# Patient Record
Sex: MALE | Race: WHITE | Employment: OTHER | URBAN - METROPOLITAN AREA
[De-identification: names, ages, dates, MRNs, and addresses within clinical notes are randomized per-mention and may not be internally consistent; named-entity substitution may affect disease eponyms.]

---

## 2023-03-25 ENCOUNTER — HOSPITAL ENCOUNTER (INPATIENT)
Age: 78
LOS: 10 days | Discharge: NURSING, REHAB OR LONG-TERM CARE | DRG: 485 | End: 2023-04-04
Attending: FAMILY MEDICINE | Admitting: INTERNAL MEDICINE
Payer: MEDICARE

## 2023-03-25 DIAGNOSIS — T84.53XA INFECTION ASSOCIATED WITH INTERNAL RIGHT KNEE PROSTHESIS, INITIAL ENCOUNTER (HCC): ICD-10-CM

## 2023-03-25 DIAGNOSIS — B95.62 MRSA BACTEREMIA: ICD-10-CM

## 2023-03-25 DIAGNOSIS — A49.01 STAPHYLOCOCCUS AUREUS INFECTION: Primary | ICD-10-CM

## 2023-03-25 DIAGNOSIS — R78.81 MRSA BACTEREMIA: ICD-10-CM

## 2023-03-25 PROBLEM — S82.892A CLOSED LEFT ANKLE FRACTURE: Status: ACTIVE | Noted: 2023-03-25

## 2023-03-25 PROBLEM — Z99.89 OSA ON CPAP: Status: ACTIVE | Noted: 2023-03-25

## 2023-03-25 PROBLEM — E03.9 HYPOTHYROID: Status: ACTIVE | Noted: 2023-03-25

## 2023-03-25 PROBLEM — T84.59XA INFECTION OF PROSTHETIC KNEE JOINT, INITIAL ENCOUNTER (HCC): Status: ACTIVE | Noted: 2023-03-25

## 2023-03-25 PROBLEM — Z79.4 TYPE 2 DIABETES MELLITUS, WITH LONG-TERM CURRENT USE OF INSULIN (HCC): Status: ACTIVE | Noted: 2023-03-25

## 2023-03-25 PROBLEM — Z96.659 INFECTION OF PROSTHETIC KNEE JOINT, INITIAL ENCOUNTER (HCC): Status: ACTIVE | Noted: 2023-03-25

## 2023-03-25 PROBLEM — I10 PRIMARY HYPERTENSION: Status: ACTIVE | Noted: 2023-03-25

## 2023-03-25 PROBLEM — G47.33 OSA ON CPAP: Status: ACTIVE | Noted: 2023-03-25

## 2023-03-25 PROBLEM — E11.9 TYPE 2 DIABETES MELLITUS, WITH LONG-TERM CURRENT USE OF INSULIN (HCC): Status: ACTIVE | Noted: 2023-03-25

## 2023-03-25 PROBLEM — I25.10 CAD (CORONARY ARTERY DISEASE): Status: ACTIVE | Noted: 2023-03-25

## 2023-03-25 PROBLEM — E66.01 MORBID OBESITY (HCC): Status: ACTIVE | Noted: 2023-03-25

## 2023-03-25 LAB
ANION GAP SERPL CALCULATED.3IONS-SCNC: 9 MMOL/L (ref 3–16)
BASOPHILS # BLD: 0.1 K/UL (ref 0–0.2)
BASOPHILS NFR BLD: 0.6 %
BUN SERPL-MCNC: 20 MG/DL (ref 7–20)
CALCIUM SERPL-MCNC: 9.5 MG/DL (ref 8.3–10.6)
CHLORIDE SERPL-SCNC: 101 MMOL/L (ref 99–110)
CO2 SERPL-SCNC: 27 MMOL/L (ref 21–32)
CREAT SERPL-MCNC: 1.2 MG/DL (ref 0.8–1.3)
DEPRECATED RDW RBC AUTO: 14.9 % (ref 12.4–15.4)
EOSINOPHIL # BLD: 0 K/UL (ref 0–0.6)
EOSINOPHIL NFR BLD: 0 %
GFR SERPLBLD CREATININE-BSD FMLA CKD-EPI: >60 ML/MIN/{1.73_M2}
GLUCOSE BLD-MCNC: 171 MG/DL (ref 70–99)
GLUCOSE SERPL-MCNC: 198 MG/DL (ref 70–99)
HCT VFR BLD AUTO: 45.9 % (ref 40.5–52.5)
HGB BLD-MCNC: 15.1 G/DL (ref 13.5–17.5)
LYMPHOCYTES # BLD: 0.9 K/UL (ref 1–5.1)
LYMPHOCYTES NFR BLD: 6.4 %
MCH RBC QN AUTO: 30.8 PG (ref 26–34)
MCHC RBC AUTO-ENTMCNC: 33 G/DL (ref 31–36)
MCV RBC AUTO: 93.4 FL (ref 80–100)
MONOCYTES # BLD: 1.1 K/UL (ref 0–1.3)
MONOCYTES NFR BLD: 8 %
NEUTROPHILS # BLD: 11.4 K/UL (ref 1.7–7.7)
NEUTROPHILS NFR BLD: 85 %
PERFORMED ON: ABNORMAL
PLATELET # BLD AUTO: 178 K/UL (ref 135–450)
PMV BLD AUTO: 9 FL (ref 5–10.5)
POTASSIUM SERPL-SCNC: 4.9 MMOL/L (ref 3.5–5.1)
RBC # BLD AUTO: 4.91 M/UL (ref 4.2–5.9)
SODIUM SERPL-SCNC: 137 MMOL/L (ref 136–145)
WBC # BLD AUTO: 13.4 K/UL (ref 4–11)

## 2023-03-25 PROCEDURE — 85025 COMPLETE CBC W/AUTO DIFF WBC: CPT

## 2023-03-25 PROCEDURE — 87186 SC STD MICRODIL/AGAR DIL: CPT

## 2023-03-25 PROCEDURE — 80048 BASIC METABOLIC PNL TOTAL CA: CPT

## 2023-03-25 PROCEDURE — 6370000000 HC RX 637 (ALT 250 FOR IP): Performed by: INTERNAL MEDICINE

## 2023-03-25 PROCEDURE — 83036 HEMOGLOBIN GLYCOSYLATED A1C: CPT

## 2023-03-25 PROCEDURE — 6360000002 HC RX W HCPCS: Performed by: INTERNAL MEDICINE

## 2023-03-25 PROCEDURE — 2580000003 HC RX 258: Performed by: INTERNAL MEDICINE

## 2023-03-25 PROCEDURE — 87150 DNA/RNA AMPLIFIED PROBE: CPT

## 2023-03-25 PROCEDURE — 84145 PROCALCITONIN (PCT): CPT

## 2023-03-25 PROCEDURE — 1200000000 HC SEMI PRIVATE

## 2023-03-25 PROCEDURE — 87040 BLOOD CULTURE FOR BACTERIA: CPT

## 2023-03-25 RX ORDER — POLYETHYLENE GLYCOL 3350 17 G/17G
17 POWDER, FOR SOLUTION ORAL DAILY PRN
Status: DISCONTINUED | OUTPATIENT
Start: 2023-03-25 | End: 2023-04-01

## 2023-03-25 RX ORDER — SIMVASTATIN 20 MG
TABLET ORAL
COMMUNITY
Start: 2023-03-21

## 2023-03-25 RX ORDER — OXYCODONE HYDROCHLORIDE 5 MG/1
10 TABLET ORAL EVERY 4 HOURS PRN
Status: DISCONTINUED | OUTPATIENT
Start: 2023-03-25 | End: 2023-04-04 | Stop reason: HOSPADM

## 2023-03-25 RX ORDER — INSULIN GLARGINE 100 [IU]/ML
INJECTION, SOLUTION SUBCUTANEOUS
COMMUNITY
Start: 2022-12-27

## 2023-03-25 RX ORDER — SODIUM CHLORIDE 0.9 % (FLUSH) 0.9 %
5-40 SYRINGE (ML) INJECTION PRN
Status: DISCONTINUED | OUTPATIENT
Start: 2023-03-25 | End: 2023-04-04 | Stop reason: HOSPADM

## 2023-03-25 RX ORDER — SODIUM CHLORIDE 0.9 % (FLUSH) 0.9 %
5-40 SYRINGE (ML) INJECTION EVERY 12 HOURS SCHEDULED
Status: DISCONTINUED | OUTPATIENT
Start: 2023-03-25 | End: 2023-04-04 | Stop reason: HOSPADM

## 2023-03-25 RX ORDER — VITAMIN B COMPLEX
1000 TABLET ORAL 2 TIMES DAILY
Status: DISCONTINUED | OUTPATIENT
Start: 2023-03-25 | End: 2023-04-04 | Stop reason: HOSPADM

## 2023-03-25 RX ORDER — LOSARTAN POTASSIUM 100 MG/1
TABLET ORAL
COMMUNITY
Start: 2020-11-06

## 2023-03-25 RX ORDER — ASPIRIN 81 MG/1
81 TABLET ORAL
Status: ON HOLD | COMMUNITY
Start: 2019-05-23 | End: 2023-03-29 | Stop reason: HOSPADM

## 2023-03-25 RX ORDER — ENOXAPARIN SODIUM 100 MG/ML
30 INJECTION SUBCUTANEOUS 2 TIMES DAILY
Status: DISCONTINUED | OUTPATIENT
Start: 2023-03-26 | End: 2023-04-04 | Stop reason: HOSPADM

## 2023-03-25 RX ORDER — CLOPIDOGREL BISULFATE 75 MG/1
75 TABLET ORAL DAILY
Status: DISCONTINUED | OUTPATIENT
Start: 2023-03-26 | End: 2023-04-04 | Stop reason: HOSPADM

## 2023-03-25 RX ORDER — DEXTROSE MONOHYDRATE 100 MG/ML
INJECTION, SOLUTION INTRAVENOUS CONTINUOUS PRN
Status: DISCONTINUED | OUTPATIENT
Start: 2023-03-25 | End: 2023-04-04 | Stop reason: HOSPADM

## 2023-03-25 RX ORDER — MORPHINE SULFATE 2 MG/ML
2 INJECTION, SOLUTION INTRAMUSCULAR; INTRAVENOUS
Status: DISCONTINUED | OUTPATIENT
Start: 2023-03-25 | End: 2023-04-04 | Stop reason: HOSPADM

## 2023-03-25 RX ORDER — INSULIN LISPRO 100 [IU]/ML
0-4 INJECTION, SOLUTION INTRAVENOUS; SUBCUTANEOUS NIGHTLY
Status: DISCONTINUED | OUTPATIENT
Start: 2023-03-25 | End: 2023-03-30

## 2023-03-25 RX ORDER — INSULIN LISPRO 100 [IU]/ML
0-8 INJECTION, SOLUTION INTRAVENOUS; SUBCUTANEOUS
Status: DISCONTINUED | OUTPATIENT
Start: 2023-03-26 | End: 2023-03-29 | Stop reason: DRUGHIGH

## 2023-03-25 RX ORDER — METOPROLOL SUCCINATE 50 MG/1
50 TABLET, EXTENDED RELEASE ORAL DAILY
Status: DISCONTINUED | OUTPATIENT
Start: 2023-03-26 | End: 2023-04-04 | Stop reason: HOSPADM

## 2023-03-25 RX ORDER — INSULIN LISPRO 100 [IU]/ML
12 INJECTION, SOLUTION INTRAVENOUS; SUBCUTANEOUS
Status: DISCONTINUED | OUTPATIENT
Start: 2023-03-26 | End: 2023-03-26

## 2023-03-25 RX ORDER — ONDANSETRON 4 MG/1
4 TABLET, ORALLY DISINTEGRATING ORAL EVERY 8 HOURS PRN
Status: DISCONTINUED | OUTPATIENT
Start: 2023-03-25 | End: 2023-04-04 | Stop reason: HOSPADM

## 2023-03-25 RX ORDER — METOPROLOL SUCCINATE 50 MG/1
TABLET, EXTENDED RELEASE ORAL
COMMUNITY
Start: 2023-03-21

## 2023-03-25 RX ORDER — ASPIRIN 81 MG/1
81 TABLET ORAL DAILY
Status: DISCONTINUED | OUTPATIENT
Start: 2023-03-25 | End: 2023-04-04 | Stop reason: HOSPADM

## 2023-03-25 RX ORDER — ACETAMINOPHEN 325 MG/1
650 TABLET ORAL EVERY 6 HOURS PRN
Status: DISCONTINUED | OUTPATIENT
Start: 2023-03-25 | End: 2023-04-04 | Stop reason: HOSPADM

## 2023-03-25 RX ORDER — CLOPIDOGREL BISULFATE 75 MG/1
TABLET ORAL
COMMUNITY
Start: 2022-12-16

## 2023-03-25 RX ORDER — LEVOTHYROXINE SODIUM 0.1 MG/1
100 TABLET ORAL DAILY
Status: DISCONTINUED | OUTPATIENT
Start: 2023-03-25 | End: 2023-04-04 | Stop reason: HOSPADM

## 2023-03-25 RX ORDER — ONDANSETRON 2 MG/ML
4 INJECTION INTRAMUSCULAR; INTRAVENOUS EVERY 6 HOURS PRN
Status: DISCONTINUED | OUTPATIENT
Start: 2023-03-25 | End: 2023-04-04 | Stop reason: HOSPADM

## 2023-03-25 RX ORDER — INSULIN GLARGINE 100 [IU]/ML
44 INJECTION, SOLUTION SUBCUTANEOUS EVERY MORNING
Status: DISCONTINUED | OUTPATIENT
Start: 2023-03-26 | End: 2023-03-26

## 2023-03-25 RX ORDER — ENOXAPARIN SODIUM 100 MG/ML
40 INJECTION SUBCUTANEOUS NIGHTLY
Status: DISCONTINUED | OUTPATIENT
Start: 2023-03-26 | End: 2023-03-25

## 2023-03-25 RX ORDER — LOSARTAN POTASSIUM 100 MG/1
100 TABLET ORAL DAILY
Status: DISCONTINUED | OUTPATIENT
Start: 2023-03-26 | End: 2023-03-26

## 2023-03-25 RX ORDER — LEVOTHYROXINE SODIUM 0.1 MG/1
TABLET ORAL
COMMUNITY
Start: 2022-11-08

## 2023-03-25 RX ORDER — ACETAMINOPHEN 650 MG/1
650 SUPPOSITORY RECTAL EVERY 6 HOURS PRN
Status: DISCONTINUED | OUTPATIENT
Start: 2023-03-25 | End: 2023-04-04 | Stop reason: HOSPADM

## 2023-03-25 RX ORDER — OXYCODONE HYDROCHLORIDE 5 MG/1
5 TABLET ORAL EVERY 4 HOURS PRN
Status: DISCONTINUED | OUTPATIENT
Start: 2023-03-25 | End: 2023-04-04 | Stop reason: HOSPADM

## 2023-03-25 RX ORDER — MORPHINE SULFATE 4 MG/ML
4 INJECTION, SOLUTION INTRAMUSCULAR; INTRAVENOUS
Status: DISCONTINUED | OUTPATIENT
Start: 2023-03-25 | End: 2023-04-04 | Stop reason: HOSPADM

## 2023-03-25 RX ORDER — ATORVASTATIN CALCIUM 10 MG/1
10 TABLET, FILM COATED ORAL DAILY
Status: DISCONTINUED | OUTPATIENT
Start: 2023-03-25 | End: 2023-04-04 | Stop reason: HOSPADM

## 2023-03-25 RX ORDER — SODIUM CHLORIDE 9 MG/ML
INJECTION, SOLUTION INTRAVENOUS PRN
Status: DISCONTINUED | OUTPATIENT
Start: 2023-03-25 | End: 2023-04-04 | Stop reason: HOSPADM

## 2023-03-25 RX ADMIN — ATORVASTATIN CALCIUM 10 MG: 10 TABLET, FILM COATED ORAL at 23:20

## 2023-03-25 RX ADMIN — ASPIRIN 81 MG: 81 TABLET, COATED ORAL at 23:20

## 2023-03-25 RX ADMIN — SODIUM CHLORIDE: 9 INJECTION, SOLUTION INTRAVENOUS at 23:25

## 2023-03-25 RX ADMIN — CEFEPIME 2000 MG: 2 INJECTION, POWDER, FOR SOLUTION INTRAVENOUS at 23:27

## 2023-03-25 RX ADMIN — LEVOTHYROXINE SODIUM 100 MCG: 100 TABLET ORAL at 23:19

## 2023-03-25 RX ADMIN — Medication 10 ML: at 23:23

## 2023-03-25 RX ADMIN — Medication 1000 UNITS: at 23:20

## 2023-03-26 ENCOUNTER — APPOINTMENT (OUTPATIENT)
Dept: GENERAL RADIOLOGY | Age: 78
DRG: 485 | End: 2023-03-26
Attending: FAMILY MEDICINE
Payer: MEDICARE

## 2023-03-26 LAB
ALBUMIN SERPL-MCNC: 3.1 G/DL (ref 3.4–5)
ALP SERPL-CCNC: 197 U/L (ref 40–129)
ALT SERPL-CCNC: 210 U/L (ref 10–40)
ANION GAP SERPL CALCULATED.3IONS-SCNC: 10 MMOL/L (ref 3–16)
APPEARANCE FLUID: NORMAL
AST SERPL-CCNC: 137 U/L (ref 15–37)
BASOPHILS # BLD: 0.1 K/UL (ref 0–0.2)
BASOPHILS NFR BLD: 0.4 %
BDY FLUID QUALITY: NORMAL
BILIRUB DIRECT SERPL-MCNC: 1.7 MG/DL (ref 0–0.3)
BILIRUB INDIRECT SERPL-MCNC: 1.1 MG/DL (ref 0–1)
BILIRUB SERPL-MCNC: 2.8 MG/DL (ref 0–1)
BUN SERPL-MCNC: 20 MG/DL (ref 7–20)
CALCIUM SERPL-MCNC: 9.6 MG/DL (ref 8.3–10.6)
CELL COUNT FLUID TYPE: NORMAL
CHLORIDE SERPL-SCNC: 103 MMOL/L (ref 99–110)
CO2 SERPL-SCNC: 23 MMOL/L (ref 21–32)
COLOR FLUID: NORMAL
CREAT SERPL-MCNC: 1.2 MG/DL (ref 0.8–1.3)
CRP SERPL-MCNC: 271.2 MG/L (ref 0–5.1)
DEPRECATED RDW RBC AUTO: 15 % (ref 12.4–15.4)
EOSINOPHIL # BLD: 0 K/UL (ref 0–0.6)
EOSINOPHIL NFR BLD: 0 %
ERYTHROCYTE [SEDIMENTATION RATE] IN BLOOD BY WESTERGREN METHOD: 67 MM/HR (ref 0–20)
GFR SERPLBLD CREATININE-BSD FMLA CKD-EPI: >60 ML/MIN/{1.73_M2}
GLUCOSE BLD-MCNC: 197 MG/DL (ref 70–99)
GLUCOSE BLD-MCNC: 222 MG/DL (ref 70–99)
GLUCOSE BLD-MCNC: 242 MG/DL (ref 70–99)
GLUCOSE BLD-MCNC: 249 MG/DL (ref 70–99)
GLUCOSE BLD-MCNC: 254 MG/DL (ref 70–99)
GLUCOSE SERPL-MCNC: 229 MG/DL (ref 70–99)
HCT VFR BLD AUTO: 42.7 % (ref 40.5–52.5)
HGB BLD-MCNC: 13.9 G/DL (ref 13.5–17.5)
INR PPP: 1.35 (ref 0.87–1.14)
LACTATE BLDV-SCNC: 1.5 MMOL/L (ref 0.4–2)
LYMPHOCYTES # BLD: 0.9 K/UL (ref 1–5.1)
LYMPHOCYTES NFR BLD: 6.3 %
LYMPHOCYTES NFR FLD: 2 %
MAGNESIUM SERPL-MCNC: 2 MG/DL (ref 1.8–2.4)
MCH RBC QN AUTO: 30.4 PG (ref 26–34)
MCHC RBC AUTO-ENTMCNC: 32.5 G/DL (ref 31–36)
MCV RBC AUTO: 93.6 FL (ref 80–100)
MONOCYTES # BLD: 1.2 K/UL (ref 0–1.3)
MONOCYTES NFR BLD: 8.9 %
MONOCYTES NFR FLD: 4 %
NEUTROPHIL, FLUID: 94 %
NEUTROPHILS # BLD: 11.8 K/UL (ref 1.7–7.7)
NEUTROPHILS NFR BLD: 84.4 %
NUC CELL # FLD: NORMAL /CUMM
PERFORMED ON: ABNORMAL
PHOSPHATE SERPL-MCNC: 2.8 MG/DL (ref 2.5–4.9)
PLATELET # BLD AUTO: 170 K/UL (ref 135–450)
PMV BLD AUTO: 8.9 FL (ref 5–10.5)
POTASSIUM SERPL-SCNC: 4.7 MMOL/L (ref 3.5–5.1)
PROCALCITONIN SERPL IA-MCNC: 0.46 NG/ML (ref 0–0.15)
PROCALCITONIN SERPL IA-MCNC: 0.5 NG/ML (ref 0–0.15)
PROT SERPL-MCNC: 6.2 G/DL (ref 6.4–8.2)
PROTHROMBIN TIME: 16.7 SEC (ref 11.7–14.5)
RBC # BLD AUTO: 4.56 M/UL (ref 4.2–5.9)
RBC FLUID: 6100 /CUMM
SODIUM SERPL-SCNC: 136 MMOL/L (ref 136–145)
TOTAL CELLS COUNTED FLD: 100
VANCOMYCIN SERPL-MCNC: 5 UG/ML
WBC # BLD AUTO: 14 K/UL (ref 4–11)

## 2023-03-26 PROCEDURE — 87070 CULTURE OTHR SPECIMN AEROBIC: CPT

## 2023-03-26 PROCEDURE — 87077 CULTURE AEROBIC IDENTIFY: CPT

## 2023-03-26 PROCEDURE — 1200000000 HC SEMI PRIVATE

## 2023-03-26 PROCEDURE — 6360000002 HC RX W HCPCS: Performed by: INTERNAL MEDICINE

## 2023-03-26 PROCEDURE — 6370000000 HC RX 637 (ALT 250 FOR IP): Performed by: INTERNAL MEDICINE

## 2023-03-26 PROCEDURE — 80048 BASIC METABOLIC PNL TOTAL CA: CPT

## 2023-03-26 PROCEDURE — 86140 C-REACTIVE PROTEIN: CPT

## 2023-03-26 PROCEDURE — 86403 PARTICLE AGGLUT ANTBDY SCRN: CPT

## 2023-03-26 PROCEDURE — 80076 HEPATIC FUNCTION PANEL: CPT

## 2023-03-26 PROCEDURE — 85652 RBC SED RATE AUTOMATED: CPT

## 2023-03-26 PROCEDURE — 99223 1ST HOSP IP/OBS HIGH 75: CPT | Performed by: ORTHOPAEDIC SURGERY

## 2023-03-26 PROCEDURE — 89051 BODY FLUID CELL COUNT: CPT

## 2023-03-26 PROCEDURE — 87205 SMEAR GRAM STAIN: CPT

## 2023-03-26 PROCEDURE — 87186 SC STD MICRODIL/AGAR DIL: CPT

## 2023-03-26 PROCEDURE — 84145 PROCALCITONIN (PCT): CPT

## 2023-03-26 PROCEDURE — 83605 ASSAY OF LACTIC ACID: CPT

## 2023-03-26 PROCEDURE — 2580000003 HC RX 258: Performed by: INTERNAL MEDICINE

## 2023-03-26 PROCEDURE — 73562 X-RAY EXAM OF KNEE 3: CPT

## 2023-03-26 PROCEDURE — 85610 PROTHROMBIN TIME: CPT

## 2023-03-26 PROCEDURE — 83735 ASSAY OF MAGNESIUM: CPT

## 2023-03-26 PROCEDURE — 20610 DRAIN/INJ JOINT/BURSA W/O US: CPT | Performed by: ORTHOPAEDIC SURGERY

## 2023-03-26 PROCEDURE — 84100 ASSAY OF PHOSPHORUS: CPT

## 2023-03-26 PROCEDURE — 85025 COMPLETE CBC W/AUTO DIFF WBC: CPT

## 2023-03-26 PROCEDURE — 80202 ASSAY OF VANCOMYCIN: CPT

## 2023-03-26 RX ORDER — INSULIN LISPRO 100 [IU]/ML
10 INJECTION, SOLUTION INTRAVENOUS; SUBCUTANEOUS
Status: DISCONTINUED | OUTPATIENT
Start: 2023-03-26 | End: 2023-03-27

## 2023-03-26 RX ORDER — LOSARTAN POTASSIUM 25 MG/1
50 TABLET ORAL DAILY
Status: DISCONTINUED | OUTPATIENT
Start: 2023-03-27 | End: 2023-04-04 | Stop reason: HOSPADM

## 2023-03-26 RX ORDER — INSULIN GLARGINE 100 [IU]/ML
30 INJECTION, SOLUTION SUBCUTANEOUS EVERY MORNING
Status: DISCONTINUED | OUTPATIENT
Start: 2023-03-27 | End: 2023-03-27

## 2023-03-26 RX ADMIN — ENOXAPARIN SODIUM 30 MG: 100 INJECTION SUBCUTANEOUS at 22:06

## 2023-03-26 RX ADMIN — Medication 1000 UNITS: at 22:06

## 2023-03-26 RX ADMIN — ASPIRIN 81 MG: 81 TABLET, COATED ORAL at 09:31

## 2023-03-26 RX ADMIN — VANCOMYCIN HYDROCHLORIDE 1250 MG: 10 INJECTION, POWDER, LYOPHILIZED, FOR SOLUTION INTRAVENOUS at 17:01

## 2023-03-26 RX ADMIN — CEFEPIME 2000 MG: 2 INJECTION, POWDER, FOR SOLUTION INTRAVENOUS at 22:26

## 2023-03-26 RX ADMIN — Medication 1000 UNITS: at 09:31

## 2023-03-26 RX ADMIN — Medication 10 ML: at 09:31

## 2023-03-26 RX ADMIN — CEFEPIME 2000 MG: 2 INJECTION, POWDER, FOR SOLUTION INTRAVENOUS at 16:17

## 2023-03-26 RX ADMIN — SODIUM CHLORIDE: 9 INJECTION, SOLUTION INTRAVENOUS at 06:24

## 2023-03-26 RX ADMIN — ATORVASTATIN CALCIUM 10 MG: 10 TABLET, FILM COATED ORAL at 09:31

## 2023-03-26 RX ADMIN — INSULIN LISPRO 2 UNITS: 100 INJECTION, SOLUTION INTRAVENOUS; SUBCUTANEOUS at 16:15

## 2023-03-26 RX ADMIN — INSULIN LISPRO 2 UNITS: 100 INJECTION, SOLUTION INTRAVENOUS; SUBCUTANEOUS at 09:03

## 2023-03-26 RX ADMIN — CEFEPIME 2000 MG: 2 INJECTION, POWDER, FOR SOLUTION INTRAVENOUS at 06:25

## 2023-03-26 NOTE — H&P
Hospital Medicine History & Physical      PCP: Corrine Duran MD    Date of Admission: 3/25/2023    Date of Service: Pt seen/examined on 03/25/23 and Admitted to Inpatient with expected LOS greater than two midnights due to medical therapy. Chief Complaint: Right knee pain and swelling x 2 days       History Of Present Illness:      Td Brenner is 76 y.o. male with history of right total knee replacement in 2013  About 1 month ago he sustained a fracture of the left ankle and currently wearing orthopedic cast boot for 1 more week  In addition, he has history of diabetes mellitus type 2 on insulin, hypertension, morbid obesity and obstructive sleep apnea on CPAP  He is now directly admitted from [facility] in [facility] for right knee pain and swelling x 2 days   The patient had a problem with this knee before which was treated for infection with surgery and without surgery in the past  He did see Dr. Latonya Nina in November 2022 for a protrusion from the right kneecap area  He had x-ray done in January and patient states he was told likely needs to have surgery  He has not followed up with orthopedics since then  Patient states he typically uses a walker and he has been able to get around without much difficulty  He thinks the right knee started to swell, became red and painful for no reason since yesterday  He denies any trauma for  Pain is constant, 6/0 without movement and 10/10 with movement or palpation.   He is unable to ambulate  Last night he noticed open wound and drainage from the protrusion  He had night sweats but denies any fever    Past Medical History:      Type 2 diabetes mellitus, with long-term current use of insulin (HCC)  CAD (coronary artery disease)  Primary hypertension  Morbid obesity (HCC)  Hypothyroid  SANTI on CPAP  Closed left ankle fracture      Past Surgical History:      Right total knee replacement in 2013    Medications Prior to Admission:      Prior to

## 2023-03-27 ENCOUNTER — APPOINTMENT (OUTPATIENT)
Dept: ULTRASOUND IMAGING | Age: 78
DRG: 485 | End: 2023-03-27
Attending: FAMILY MEDICINE
Payer: MEDICARE

## 2023-03-27 ENCOUNTER — APPOINTMENT (OUTPATIENT)
Dept: GENERAL RADIOLOGY | Age: 78
DRG: 485 | End: 2023-03-27
Attending: FAMILY MEDICINE
Payer: MEDICARE

## 2023-03-27 ENCOUNTER — ANESTHESIA EVENT (OUTPATIENT)
Dept: OPERATING ROOM | Age: 78
End: 2023-03-27
Payer: MEDICARE

## 2023-03-27 LAB
ALBUMIN SERPL-MCNC: 2.9 G/DL (ref 3.4–5)
ALBUMIN/GLOB SERPL: 0.9 {RATIO} (ref 1.1–2.2)
ALP SERPL-CCNC: 180 U/L (ref 40–129)
ALT SERPL-CCNC: 134 U/L (ref 10–40)
ANION GAP SERPL CALCULATED.3IONS-SCNC: 12 MMOL/L (ref 3–16)
AST SERPL-CCNC: 44 U/L (ref 15–37)
BASOPHILS # BLD: 0.1 K/UL (ref 0–0.2)
BASOPHILS NFR BLD: 0.6 %
BILIRUB SERPL-MCNC: 1.6 MG/DL (ref 0–1)
BUN SERPL-MCNC: 28 MG/DL (ref 7–20)
CALCIUM SERPL-MCNC: 9.6 MG/DL (ref 8.3–10.6)
CHLORIDE SERPL-SCNC: 104 MMOL/L (ref 99–110)
CO2 SERPL-SCNC: 21 MMOL/L (ref 21–32)
CREAT SERPL-MCNC: 1.4 MG/DL (ref 0.8–1.3)
DEPRECATED RDW RBC AUTO: 14.4 % (ref 12.4–15.4)
EOSINOPHIL # BLD: 0 K/UL (ref 0–0.6)
EOSINOPHIL NFR BLD: 0.1 %
EST. AVERAGE GLUCOSE BLD GHB EST-MCNC: 157.1 MG/DL
GFR SERPLBLD CREATININE-BSD FMLA CKD-EPI: 51 ML/MIN/{1.73_M2}
GLUCOSE BLD-MCNC: 152 MG/DL (ref 70–99)
GLUCOSE BLD-MCNC: 188 MG/DL (ref 70–99)
GLUCOSE BLD-MCNC: 267 MG/DL (ref 70–99)
GLUCOSE BLD-MCNC: 291 MG/DL (ref 70–99)
GLUCOSE SERPL-MCNC: 291 MG/DL (ref 70–99)
HBA1C MFR BLD: 7.1 %
HCT VFR BLD AUTO: 42.3 % (ref 40.5–52.5)
HGB BLD-MCNC: 13.7 G/DL (ref 13.5–17.5)
LYMPHOCYTES # BLD: 0.9 K/UL (ref 1–5.1)
LYMPHOCYTES NFR BLD: 7.9 %
MCH RBC QN AUTO: 30.2 PG (ref 26–34)
MCHC RBC AUTO-ENTMCNC: 32.3 G/DL (ref 31–36)
MCV RBC AUTO: 93.5 FL (ref 80–100)
MONOCYTES # BLD: 1.1 K/UL (ref 0–1.3)
MONOCYTES NFR BLD: 9.2 %
NEUTROPHILS # BLD: 9.8 K/UL (ref 1.7–7.7)
NEUTROPHILS NFR BLD: 82.2 %
PERFORMED ON: ABNORMAL
PLATELET # BLD AUTO: 155 K/UL (ref 135–450)
PMV BLD AUTO: 9.2 FL (ref 5–10.5)
POTASSIUM SERPL-SCNC: 4.7 MMOL/L (ref 3.5–5.1)
PROT SERPL-MCNC: 6.3 G/DL (ref 6.4–8.2)
RBC # BLD AUTO: 4.53 M/UL (ref 4.2–5.9)
SODIUM SERPL-SCNC: 137 MMOL/L (ref 136–145)
VANCOMYCIN SERPL-MCNC: 11.1 UG/ML
WBC # BLD AUTO: 11.9 K/UL (ref 4–11)

## 2023-03-27 PROCEDURE — 76705 ECHO EXAM OF ABDOMEN: CPT

## 2023-03-27 PROCEDURE — 6370000000 HC RX 637 (ALT 250 FOR IP): Performed by: INTERNAL MEDICINE

## 2023-03-27 PROCEDURE — 6360000002 HC RX W HCPCS: Performed by: INTERNAL MEDICINE

## 2023-03-27 PROCEDURE — 1200000000 HC SEMI PRIVATE

## 2023-03-27 PROCEDURE — 36415 COLL VENOUS BLD VENIPUNCTURE: CPT

## 2023-03-27 PROCEDURE — 2580000003 HC RX 258: Performed by: INTERNAL MEDICINE

## 2023-03-27 PROCEDURE — 85025 COMPLETE CBC W/AUTO DIFF WBC: CPT

## 2023-03-27 PROCEDURE — 73610 X-RAY EXAM OF ANKLE: CPT

## 2023-03-27 PROCEDURE — 6370000000 HC RX 637 (ALT 250 FOR IP): Performed by: PHYSICIAN ASSISTANT

## 2023-03-27 PROCEDURE — 80202 ASSAY OF VANCOMYCIN: CPT

## 2023-03-27 PROCEDURE — 80053 COMPREHEN METABOLIC PANEL: CPT

## 2023-03-27 PROCEDURE — 99223 1ST HOSP IP/OBS HIGH 75: CPT | Performed by: INTERNAL MEDICINE

## 2023-03-27 RX ORDER — INSULIN LISPRO 100 [IU]/ML
13 INJECTION, SOLUTION INTRAVENOUS; SUBCUTANEOUS
Status: DISCONTINUED | OUTPATIENT
Start: 2023-03-27 | End: 2023-03-29

## 2023-03-27 RX ORDER — INSULIN GLARGINE 100 [IU]/ML
40 INJECTION, SOLUTION SUBCUTANEOUS EVERY MORNING
Status: DISCONTINUED | OUTPATIENT
Start: 2023-03-28 | End: 2023-03-29

## 2023-03-27 RX ORDER — LIDOCAINE HYDROCHLORIDE 10 MG/ML
5 INJECTION, SOLUTION EPIDURAL; INFILTRATION; INTRACAUDAL; PERINEURAL ONCE
Status: DISCONTINUED | OUTPATIENT
Start: 2023-03-27 | End: 2023-03-29

## 2023-03-27 RX ORDER — SODIUM CHLORIDE 0.9 % (FLUSH) 0.9 %
5-40 SYRINGE (ML) INJECTION PRN
Status: DISCONTINUED | OUTPATIENT
Start: 2023-03-27 | End: 2023-04-04 | Stop reason: HOSPADM

## 2023-03-27 RX ORDER — SODIUM CHLORIDE 0.9 % (FLUSH) 0.9 %
5-40 SYRINGE (ML) INJECTION EVERY 12 HOURS SCHEDULED
Status: DISCONTINUED | OUTPATIENT
Start: 2023-03-27 | End: 2023-04-04 | Stop reason: HOSPADM

## 2023-03-27 RX ORDER — AMLODIPINE BESYLATE 5 MG/1
5 TABLET ORAL DAILY
Status: DISCONTINUED | OUTPATIENT
Start: 2023-03-27 | End: 2023-03-28

## 2023-03-27 RX ORDER — SODIUM CHLORIDE 9 MG/ML
25 INJECTION, SOLUTION INTRAVENOUS PRN
Status: DISCONTINUED | OUTPATIENT
Start: 2023-03-27 | End: 2023-04-04 | Stop reason: HOSPADM

## 2023-03-27 RX ADMIN — INSULIN LISPRO 10 UNITS: 100 INJECTION, SOLUTION INTRAVENOUS; SUBCUTANEOUS at 09:39

## 2023-03-27 RX ADMIN — METOPROLOL SUCCINATE 50 MG: 50 TABLET, EXTENDED RELEASE ORAL at 09:36

## 2023-03-27 RX ADMIN — CEFAZOLIN 2000 MG: 2 INJECTION, POWDER, FOR SOLUTION INTRAMUSCULAR; INTRAVENOUS at 21:14

## 2023-03-27 RX ADMIN — INSULIN LISPRO 4 UNITS: 100 INJECTION, SOLUTION INTRAVENOUS; SUBCUTANEOUS at 09:40

## 2023-03-27 RX ADMIN — Medication 1000 UNITS: at 21:11

## 2023-03-27 RX ADMIN — VANCOMYCIN HYDROCHLORIDE 1250 MG: 10 INJECTION, POWDER, LYOPHILIZED, FOR SOLUTION INTRAVENOUS at 15:51

## 2023-03-27 RX ADMIN — INSULIN GLARGINE 30 UNITS: 100 INJECTION, SOLUTION SUBCUTANEOUS at 09:39

## 2023-03-27 RX ADMIN — CEFEPIME 2000 MG: 2 INJECTION, POWDER, FOR SOLUTION INTRAVENOUS at 05:35

## 2023-03-27 RX ADMIN — INSULIN LISPRO 4 UNITS: 100 INJECTION, SOLUTION INTRAVENOUS; SUBCUTANEOUS at 13:10

## 2023-03-27 RX ADMIN — INSULIN LISPRO 13 UNITS: 100 INJECTION, SOLUTION INTRAVENOUS; SUBCUTANEOUS at 16:59

## 2023-03-27 RX ADMIN — LOSARTAN POTASSIUM 50 MG: 25 TABLET, FILM COATED ORAL at 09:36

## 2023-03-27 RX ADMIN — ATORVASTATIN CALCIUM 10 MG: 10 TABLET, FILM COATED ORAL at 09:36

## 2023-03-27 RX ADMIN — LEVOTHYROXINE SODIUM 100 MCG: 100 TABLET ORAL at 05:33

## 2023-03-27 RX ADMIN — CEFEPIME 2000 MG: 2 INJECTION, POWDER, FOR SOLUTION INTRAVENOUS at 14:54

## 2023-03-27 RX ADMIN — ONDANSETRON 4 MG: 4 TABLET, ORALLY DISINTEGRATING ORAL at 21:23

## 2023-03-27 RX ADMIN — Medication 1000 UNITS: at 09:36

## 2023-03-27 RX ADMIN — AMLODIPINE BESYLATE 5 MG: 5 TABLET ORAL at 13:15

## 2023-03-27 NOTE — ANESTHESIA PRE PROCEDURE
COVID19        Anesthesia Evaluation    Airway:           Dental:          Pulmonary:   (+) sleep apnea: on CPAP,                             Cardiovascular:    (+) hypertension:, CAD:, CABG/stent: no interval change, hyperlipidemia                  Neuro/Psych:               GI/Hepatic/Renal:             Endo/Other:    (+) DiabetesType II DM, , hypothyroidism, blood dyscrasia: anticoagulation therapy:., .                  ROS comment: A1C 7.5 Abdominal:             Vascular:   + DVT, . Other Findings:           Anesthesia Plan      general     ASA 3       Induction: intravenous. MIPS: Postoperative opioids intended and Prophylactic antiemetics administered. Anesthetic plan and risks discussed with patient.         Attending anesthesiologist reviewed and agrees with Preprocedure content                PHONG Yost - [place]   3/27/2023

## 2023-03-28 ENCOUNTER — APPOINTMENT (OUTPATIENT)
Dept: GENERAL RADIOLOGY | Age: 78
DRG: 485 | End: 2023-03-28
Attending: FAMILY MEDICINE
Payer: MEDICARE

## 2023-03-28 ENCOUNTER — ANESTHESIA (OUTPATIENT)
Dept: OPERATING ROOM | Age: 78
End: 2023-03-28
Payer: MEDICARE

## 2023-03-28 LAB
ALBUMIN SERPL-MCNC: 3 G/DL (ref 3.4–5)
ALBUMIN/GLOB SERPL: 0.8 {RATIO} (ref 1.1–2.2)
ALP SERPL-CCNC: 192 U/L (ref 40–129)
ALT SERPL-CCNC: 96 U/L (ref 10–40)
ANION GAP SERPL CALCULATED.3IONS-SCNC: 13 MMOL/L (ref 3–16)
AST SERPL-CCNC: 26 U/L (ref 15–37)
BASOPHILS # BLD: 0 K/UL (ref 0–0.2)
BASOPHILS NFR BLD: 0.2 %
BILIRUB SERPL-MCNC: 1 MG/DL (ref 0–1)
BUN SERPL-MCNC: 32 MG/DL (ref 7–20)
CALCIUM SERPL-MCNC: 9.6 MG/DL (ref 8.3–10.6)
CHLORIDE SERPL-SCNC: 101 MMOL/L (ref 99–110)
CO2 SERPL-SCNC: 24 MMOL/L (ref 21–32)
CREAT SERPL-MCNC: 1.1 MG/DL (ref 0.8–1.3)
DEPRECATED RDW RBC AUTO: 14.8 % (ref 12.4–15.4)
EOSINOPHIL # BLD: 0 K/UL (ref 0–0.6)
EOSINOPHIL NFR BLD: 0 %
GFR SERPLBLD CREATININE-BSD FMLA CKD-EPI: >60 ML/MIN/{1.73_M2}
GLUCOSE BLD-MCNC: 248 MG/DL (ref 70–99)
GLUCOSE BLD-MCNC: 251 MG/DL (ref 70–99)
GLUCOSE BLD-MCNC: 274 MG/DL (ref 70–99)
GLUCOSE BLD-MCNC: 298 MG/DL (ref 70–99)
GLUCOSE BLD-MCNC: 318 MG/DL (ref 70–99)
GLUCOSE SERPL-MCNC: 287 MG/DL (ref 70–99)
HCT VFR BLD AUTO: 42.6 % (ref 40.5–52.5)
HGB BLD-MCNC: 13.8 G/DL (ref 13.5–17.5)
LYMPHOCYTES # BLD: 0.5 K/UL (ref 1–5.1)
LYMPHOCYTES NFR BLD: 4.9 %
MCH RBC QN AUTO: 30.3 PG (ref 26–34)
MCHC RBC AUTO-ENTMCNC: 32.3 G/DL (ref 31–36)
MCV RBC AUTO: 93.6 FL (ref 80–100)
MONOCYTES # BLD: 0.5 K/UL (ref 0–1.3)
MONOCYTES NFR BLD: 4.2 %
NEUTROPHILS # BLD: 10.1 K/UL (ref 1.7–7.7)
NEUTROPHILS NFR BLD: 90.7 %
PERFORMED ON: ABNORMAL
PLATELET # BLD AUTO: 177 K/UL (ref 135–450)
PMV BLD AUTO: 9.3 FL (ref 5–10.5)
POTASSIUM SERPL-SCNC: 4.5 MMOL/L (ref 3.5–5.1)
PROT SERPL-MCNC: 7 G/DL (ref 6.4–8.2)
RBC # BLD AUTO: 4.55 M/UL (ref 4.2–5.9)
REPORT: NORMAL
SODIUM SERPL-SCNC: 138 MMOL/L (ref 136–145)
WBC # BLD AUTO: 11.1 K/UL (ref 4–11)

## 2023-03-28 PROCEDURE — 85025 COMPLETE CBC W/AUTO DIFF WBC: CPT

## 2023-03-28 PROCEDURE — 2500000003 HC RX 250 WO HCPCS: Performed by: NURSE ANESTHETIST, CERTIFIED REGISTERED

## 2023-03-28 PROCEDURE — C1769 GUIDE WIRE: HCPCS | Performed by: ORTHOPAEDIC SURGERY

## 2023-03-28 PROCEDURE — 87205 SMEAR GRAM STAIN: CPT

## 2023-03-28 PROCEDURE — 02HV33Z INSERTION OF INFUSION DEVICE INTO SUPERIOR VENA CAVA, PERCUTANEOUS APPROACH: ICD-10-PCS | Performed by: INTERNAL MEDICINE

## 2023-03-28 PROCEDURE — 2720000010 HC SURG SUPPLY STERILE: Performed by: ORTHOPAEDIC SURGERY

## 2023-03-28 PROCEDURE — 3700000001 HC ADD 15 MINUTES (ANESTHESIA): Performed by: ORTHOPAEDIC SURGERY

## 2023-03-28 PROCEDURE — 99233 SBSQ HOSP IP/OBS HIGH 50: CPT | Performed by: INTERNAL MEDICINE

## 2023-03-28 PROCEDURE — 3700000000 HC ANESTHESIA ATTENDED CARE: Performed by: ORTHOPAEDIC SURGERY

## 2023-03-28 PROCEDURE — 6360000002 HC RX W HCPCS: Performed by: INTERNAL MEDICINE

## 2023-03-28 PROCEDURE — 2580000003 HC RX 258: Performed by: ANESTHESIOLOGY

## 2023-03-28 PROCEDURE — 73560 X-RAY EXAM OF KNEE 1 OR 2: CPT

## 2023-03-28 PROCEDURE — 3600000015 HC SURGERY LEVEL 5 ADDTL 15MIN: Performed by: ORTHOPAEDIC SURGERY

## 2023-03-28 PROCEDURE — 6370000000 HC RX 637 (ALT 250 FOR IP): Performed by: PHYSICIAN ASSISTANT

## 2023-03-28 PROCEDURE — 7100000001 HC PACU RECOVERY - ADDTL 15 MIN: Performed by: ORTHOPAEDIC SURGERY

## 2023-03-28 PROCEDURE — 3600000005 HC SURGERY LEVEL 5 BASE: Performed by: ORTHOPAEDIC SURGERY

## 2023-03-28 PROCEDURE — 6360000002 HC RX W HCPCS: Performed by: ORTHOPAEDIC SURGERY

## 2023-03-28 PROCEDURE — 2500000003 HC RX 250 WO HCPCS: Performed by: ORTHOPAEDIC SURGERY

## 2023-03-28 PROCEDURE — 6360000002 HC RX W HCPCS: Performed by: PHYSICIAN ASSISTANT

## 2023-03-28 PROCEDURE — 36415 COLL VENOUS BLD VENIPUNCTURE: CPT

## 2023-03-28 PROCEDURE — C1751 CATH, INF, PER/CENT/MIDLINE: HCPCS

## 2023-03-28 PROCEDURE — 2580000003 HC RX 258: Performed by: INTERNAL MEDICINE

## 2023-03-28 PROCEDURE — C9113 INJ PANTOPRAZOLE SODIUM, VIA: HCPCS | Performed by: PHYSICIAN ASSISTANT

## 2023-03-28 PROCEDURE — 2709999900 HC NON-CHARGEABLE SUPPLY: Performed by: ORTHOPAEDIC SURGERY

## 2023-03-28 PROCEDURE — 2580000003 HC RX 258: Performed by: ORTHOPAEDIC SURGERY

## 2023-03-28 PROCEDURE — 87070 CULTURE OTHR SPECIMN AEROBIC: CPT

## 2023-03-28 PROCEDURE — 6370000000 HC RX 637 (ALT 250 FOR IP): Performed by: NURSE PRACTITIONER

## 2023-03-28 PROCEDURE — 0S9C0ZZ DRAINAGE OF RIGHT KNEE JOINT, OPEN APPROACH: ICD-10-PCS | Performed by: ORTHOPAEDIC SURGERY

## 2023-03-28 PROCEDURE — 1200000000 HC SEMI PRIVATE

## 2023-03-28 PROCEDURE — 36569 INSJ PICC 5 YR+ W/O IMAGING: CPT

## 2023-03-28 PROCEDURE — 7100000000 HC PACU RECOVERY - FIRST 15 MIN: Performed by: ORTHOPAEDIC SURGERY

## 2023-03-28 PROCEDURE — 0QPD04Z REMOVAL OF INTERNAL FIXATION DEVICE FROM RIGHT PATELLA, OPEN APPROACH: ICD-10-PCS | Performed by: ORTHOPAEDIC SURGERY

## 2023-03-28 PROCEDURE — C1713 ANCHOR/SCREW BN/BN,TIS/BN: HCPCS | Performed by: ORTHOPAEDIC SURGERY

## 2023-03-28 PROCEDURE — 6360000002 HC RX W HCPCS: Performed by: NURSE ANESTHETIST, CERTIFIED REGISTERED

## 2023-03-28 PROCEDURE — 87176 TISSUE HOMOGENIZATION CULTR: CPT

## 2023-03-28 PROCEDURE — 80053 COMPREHEN METABOLIC PANEL: CPT

## 2023-03-28 PROCEDURE — 0SHC08Z INSERTION OF SPACER INTO RIGHT KNEE JOINT, OPEN APPROACH: ICD-10-PCS | Performed by: ORTHOPAEDIC SURGERY

## 2023-03-28 PROCEDURE — 6370000000 HC RX 637 (ALT 250 FOR IP): Performed by: INTERNAL MEDICINE

## 2023-03-28 DEVICE — IMPLANTABLE DEVICE: Type: IMPLANTABLE DEVICE | Site: KNEE | Status: FUNCTIONAL

## 2023-03-28 DEVICE — CEMENT BNE 40GM HI VISC RADPQ FOR REV SURG: Type: IMPLANTABLE DEVICE | Site: KNEE | Status: FUNCTIONAL

## 2023-03-28 RX ORDER — SODIUM CHLORIDE 9 MG/ML
INJECTION, SOLUTION INTRAVENOUS CONTINUOUS
Status: DISCONTINUED | OUTPATIENT
Start: 2023-03-28 | End: 2023-03-28 | Stop reason: HOSPADM

## 2023-03-28 RX ORDER — ONDANSETRON 2 MG/ML
4 INJECTION INTRAMUSCULAR; INTRAVENOUS
Status: DISCONTINUED | OUTPATIENT
Start: 2023-03-28 | End: 2023-03-28 | Stop reason: HOSPADM

## 2023-03-28 RX ORDER — INSULIN GLARGINE 100 [IU]/ML
20 INJECTION, SOLUTION SUBCUTANEOUS ONCE
Status: COMPLETED | OUTPATIENT
Start: 2023-03-28 | End: 2023-03-28

## 2023-03-28 RX ORDER — SODIUM CHLORIDE 9 MG/ML
INJECTION, SOLUTION INTRAVENOUS PRN
Status: DISCONTINUED | OUTPATIENT
Start: 2023-03-28 | End: 2023-03-28 | Stop reason: HOSPADM

## 2023-03-28 RX ORDER — LIDOCAINE HYDROCHLORIDE 20 MG/ML
INJECTION, SOLUTION EPIDURAL; INFILTRATION; INTRACAUDAL; PERINEURAL PRN
Status: DISCONTINUED | OUTPATIENT
Start: 2023-03-28 | End: 2023-03-28 | Stop reason: SDUPTHER

## 2023-03-28 RX ORDER — TOBRAMYCIN 1.2 G/30ML
INJECTION, POWDER, LYOPHILIZED, FOR SOLUTION INTRAVENOUS
Status: COMPLETED | OUTPATIENT
Start: 2023-03-28 | End: 2023-03-28

## 2023-03-28 RX ORDER — HYDROMORPHONE HCL 110MG/55ML
0.5 PATIENT CONTROLLED ANALGESIA SYRINGE INTRAVENOUS EVERY 5 MIN PRN
Status: DISCONTINUED | OUTPATIENT
Start: 2023-03-28 | End: 2023-03-28 | Stop reason: HOSPADM

## 2023-03-28 RX ORDER — CALCIUM CARBONATE 200(500)MG
500 TABLET,CHEWABLE ORAL 3 TIMES DAILY PRN
Status: DISCONTINUED | OUTPATIENT
Start: 2023-03-28 | End: 2023-04-04 | Stop reason: HOSPADM

## 2023-03-28 RX ORDER — FENTANYL CITRATE 50 UG/ML
INJECTION, SOLUTION INTRAMUSCULAR; INTRAVENOUS PRN
Status: DISCONTINUED | OUTPATIENT
Start: 2023-03-28 | End: 2023-03-28 | Stop reason: SDUPTHER

## 2023-03-28 RX ORDER — DIPHENHYDRAMINE HYDROCHLORIDE 50 MG/ML
12.5 INJECTION INTRAMUSCULAR; INTRAVENOUS
Status: DISCONTINUED | OUTPATIENT
Start: 2023-03-28 | End: 2023-03-28 | Stop reason: HOSPADM

## 2023-03-28 RX ORDER — HYDROMORPHONE HCL 110MG/55ML
0.25 PATIENT CONTROLLED ANALGESIA SYRINGE INTRAVENOUS EVERY 5 MIN PRN
Status: DISCONTINUED | OUTPATIENT
Start: 2023-03-28 | End: 2023-03-28 | Stop reason: HOSPADM

## 2023-03-28 RX ORDER — VANCOMYCIN HYDROCHLORIDE 1 G/20ML
INJECTION, POWDER, LYOPHILIZED, FOR SOLUTION INTRAVENOUS
Status: COMPLETED | OUTPATIENT
Start: 2023-03-28 | End: 2023-03-28

## 2023-03-28 RX ORDER — PROPOFOL 10 MG/ML
INJECTION, EMULSION INTRAVENOUS PRN
Status: DISCONTINUED | OUTPATIENT
Start: 2023-03-28 | End: 2023-03-28

## 2023-03-28 RX ORDER — ACETAMINOPHEN 650 MG
TABLET, EXTENDED RELEASE ORAL
Status: COMPLETED | OUTPATIENT
Start: 2023-03-28 | End: 2023-03-28

## 2023-03-28 RX ORDER — DEXAMETHASONE SODIUM PHOSPHATE 4 MG/ML
INJECTION, SOLUTION INTRA-ARTICULAR; INTRALESIONAL; INTRAMUSCULAR; INTRAVENOUS; SOFT TISSUE PRN
Status: DISCONTINUED | OUTPATIENT
Start: 2023-03-28 | End: 2023-03-28 | Stop reason: SDUPTHER

## 2023-03-28 RX ORDER — MEPERIDINE HYDROCHLORIDE 25 MG/ML
12.5 INJECTION INTRAMUSCULAR; INTRAVENOUS; SUBCUTANEOUS EVERY 5 MIN PRN
Status: DISCONTINUED | OUTPATIENT
Start: 2023-03-28 | End: 2023-03-28 | Stop reason: HOSPADM

## 2023-03-28 RX ORDER — TRANEXAMIC ACID 10 MG/ML
1000 INJECTION, SOLUTION INTRAVENOUS ONCE
Status: COMPLETED | OUTPATIENT
Start: 2023-03-28 | End: 2023-03-28

## 2023-03-28 RX ORDER — SODIUM CHLORIDE 0.9 % (FLUSH) 0.9 %
5-40 SYRINGE (ML) INJECTION PRN
Status: DISCONTINUED | OUTPATIENT
Start: 2023-03-28 | End: 2023-03-28 | Stop reason: HOSPADM

## 2023-03-28 RX ORDER — ONDANSETRON 2 MG/ML
INJECTION INTRAMUSCULAR; INTRAVENOUS PRN
Status: DISCONTINUED | OUTPATIENT
Start: 2023-03-28 | End: 2023-03-28 | Stop reason: SDUPTHER

## 2023-03-28 RX ORDER — AMLODIPINE BESYLATE 5 MG/1
10 TABLET ORAL DAILY
Status: DISCONTINUED | OUTPATIENT
Start: 2023-03-28 | End: 2023-04-03

## 2023-03-28 RX ORDER — PROPOFOL 10 MG/ML
INJECTION, EMULSION INTRAVENOUS PRN
Status: DISCONTINUED | OUTPATIENT
Start: 2023-03-28 | End: 2023-03-28 | Stop reason: SDUPTHER

## 2023-03-28 RX ORDER — SUCCINYLCHOLINE/SOD CL,ISO/PF 200MG/10ML
SYRINGE (ML) INTRAVENOUS PRN
Status: DISCONTINUED | OUTPATIENT
Start: 2023-03-28 | End: 2023-03-28 | Stop reason: SDUPTHER

## 2023-03-28 RX ORDER — SODIUM CHLORIDE 0.9 % (FLUSH) 0.9 %
5-40 SYRINGE (ML) INJECTION EVERY 12 HOURS SCHEDULED
Status: DISCONTINUED | OUTPATIENT
Start: 2023-03-28 | End: 2023-03-28 | Stop reason: HOSPADM

## 2023-03-28 RX ORDER — ROCURONIUM BROMIDE 10 MG/ML
INJECTION, SOLUTION INTRAVENOUS PRN
Status: DISCONTINUED | OUTPATIENT
Start: 2023-03-28 | End: 2023-03-28 | Stop reason: SDUPTHER

## 2023-03-28 RX ORDER — PANTOPRAZOLE SODIUM 40 MG/10ML
40 INJECTION, POWDER, LYOPHILIZED, FOR SOLUTION INTRAVENOUS DAILY
Status: DISCONTINUED | OUTPATIENT
Start: 2023-03-28 | End: 2023-03-29

## 2023-03-28 RX ORDER — VASOPRESSIN 20 U/ML
INJECTION PARENTERAL PRN
Status: DISCONTINUED | OUTPATIENT
Start: 2023-03-28 | End: 2023-03-28 | Stop reason: SDUPTHER

## 2023-03-28 RX ORDER — CLONIDINE HYDROCHLORIDE 0.1 MG/1
0.1 TABLET ORAL EVERY 4 HOURS PRN
Status: DISCONTINUED | OUTPATIENT
Start: 2023-03-28 | End: 2023-04-04 | Stop reason: HOSPADM

## 2023-03-28 RX ADMIN — SODIUM CHLORIDE: 9 INJECTION, SOLUTION INTRAVENOUS at 12:00

## 2023-03-28 RX ADMIN — CEFAZOLIN 2000 MG: 2 INJECTION, POWDER, FOR SOLUTION INTRAMUSCULAR; INTRAVENOUS at 12:51

## 2023-03-28 RX ADMIN — SODIUM CHLORIDE: 9 INJECTION, SOLUTION INTRAVENOUS at 10:23

## 2023-03-28 RX ADMIN — SUGAMMADEX 200 MG: 100 INJECTION, SOLUTION INTRAVENOUS at 12:56

## 2023-03-28 RX ADMIN — INSULIN LISPRO 13 UNITS: 100 INJECTION, SOLUTION INTRAVENOUS; SUBCUTANEOUS at 16:42

## 2023-03-28 RX ADMIN — Medication 1000 UNITS: at 21:34

## 2023-03-28 RX ADMIN — Medication 160 MG: at 09:45

## 2023-03-28 RX ADMIN — PHENYLEPHRINE HYDROCHLORIDE 200 MCG: 10 INJECTION INTRAVENOUS at 12:33

## 2023-03-28 RX ADMIN — DEXAMETHASONE SODIUM PHOSPHATE 4 MG: 4 INJECTION, SOLUTION INTRAMUSCULAR; INTRAVENOUS at 10:30

## 2023-03-28 RX ADMIN — TRANEXAMIC ACID 1000 MG: 10 INJECTION, SOLUTION INTRAVENOUS at 10:10

## 2023-03-28 RX ADMIN — SODIUM CHLORIDE: 9 INJECTION, SOLUTION INTRAVENOUS at 09:20

## 2023-03-28 RX ADMIN — ANTACID TABLETS 500 MG: 500 TABLET, CHEWABLE ORAL at 05:28

## 2023-03-28 RX ADMIN — FENTANYL CITRATE 25 MCG: 50 INJECTION, SOLUTION INTRAMUSCULAR; INTRAVENOUS at 12:54

## 2023-03-28 RX ADMIN — INSULIN LISPRO 4 UNITS: 100 INJECTION, SOLUTION INTRAVENOUS; SUBCUTANEOUS at 21:34

## 2023-03-28 RX ADMIN — INSULIN GLARGINE 20 UNITS: 100 INJECTION, SOLUTION SUBCUTANEOUS at 08:15

## 2023-03-28 RX ADMIN — CEFAZOLIN 2000 MG: 2 INJECTION, POWDER, FOR SOLUTION INTRAMUSCULAR; INTRAVENOUS at 05:30

## 2023-03-28 RX ADMIN — PROPOFOL 170 MG: 10 INJECTION, EMULSION INTRAVENOUS at 09:45

## 2023-03-28 RX ADMIN — LIDOCAINE HYDROCHLORIDE 100 MG: 20 INJECTION, SOLUTION EPIDURAL; INFILTRATION; INTRACAUDAL; PERINEURAL at 09:44

## 2023-03-28 RX ADMIN — PHENYLEPHRINE HYDROCHLORIDE 200 MCG: 10 INJECTION INTRAVENOUS at 12:25

## 2023-03-28 RX ADMIN — VASOPRESSIN 2 UNITS: 20 INJECTION INTRAVENOUS at 12:51

## 2023-03-28 RX ADMIN — FENTANYL CITRATE 25 MCG: 50 INJECTION, SOLUTION INTRAMUSCULAR; INTRAVENOUS at 09:43

## 2023-03-28 RX ADMIN — INSULIN LISPRO 4 UNITS: 100 INJECTION, SOLUTION INTRAVENOUS; SUBCUTANEOUS at 16:41

## 2023-03-28 RX ADMIN — ENOXAPARIN SODIUM 30 MG: 100 INJECTION SUBCUTANEOUS at 21:34

## 2023-03-28 RX ADMIN — FENTANYL CITRATE 25 MCG: 50 INJECTION, SOLUTION INTRAMUSCULAR; INTRAVENOUS at 09:39

## 2023-03-28 RX ADMIN — METOPROLOL SUCCINATE 50 MG: 50 TABLET, EXTENDED RELEASE ORAL at 08:13

## 2023-03-28 RX ADMIN — LEVOTHYROXINE SODIUM 100 MCG: 100 TABLET ORAL at 05:28

## 2023-03-28 RX ADMIN — FENTANYL CITRATE 25 MCG: 50 INJECTION, SOLUTION INTRAMUSCULAR; INTRAVENOUS at 12:47

## 2023-03-28 RX ADMIN — ROCURONIUM BROMIDE 40 MG: 10 INJECTION, SOLUTION INTRAVENOUS at 10:10

## 2023-03-28 RX ADMIN — INSULIN LISPRO 4 UNITS: 100 INJECTION, SOLUTION INTRAVENOUS; SUBCUTANEOUS at 08:16

## 2023-03-28 RX ADMIN — VANCOMYCIN HYDROCHLORIDE 1250 MG: 10 INJECTION, POWDER, LYOPHILIZED, FOR SOLUTION INTRAVENOUS at 16:04

## 2023-03-28 RX ADMIN — PANTOPRAZOLE SODIUM 40 MG: 40 INJECTION, POWDER, FOR SOLUTION INTRAVENOUS at 08:12

## 2023-03-28 RX ADMIN — ONDANSETRON 4 MG: 2 INJECTION INTRAMUSCULAR; INTRAVENOUS at 10:30

## 2023-03-28 RX ADMIN — AMLODIPINE BESYLATE 10 MG: 5 TABLET ORAL at 08:13

## 2023-03-28 RX ADMIN — BUPIVACAINE HYDROCHLORIDE AND EPINEPHRINE BITARTRATE: 2.5; .005 INJECTION, SOLUTION EPIDURAL; INFILTRATION; INTRACAUDAL; PERINEURAL at 12:51

## 2023-03-28 ASSESSMENT — PAIN SCALES - GENERAL: PAINLEVEL_OUTOF10: 0

## 2023-03-28 ASSESSMENT — PAIN - FUNCTIONAL ASSESSMENT
PAIN_FUNCTIONAL_ASSESSMENT: 0-10
PAIN_FUNCTIONAL_ASSESSMENT: 0-10

## 2023-03-28 NOTE — ANESTHESIA PRE PROCEDURE
Component Value Date/Time     03/28/2023 04:45 AM    K 4.5 03/28/2023 04:45 AM     03/28/2023 04:45 AM    CO2 24 03/28/2023 04:45 AM    BUN 32 03/28/2023 04:45 AM    CREATININE 1.1 03/28/2023 04:45 AM    AGRATIO 0.8 03/28/2023 04:45 AM    LABGLOM >60 03/28/2023 04:45 AM    GLUCOSE 287 03/28/2023 04:45 AM    PROT 7.0 03/28/2023 04:45 AM    CALCIUM 9.6 03/28/2023 04:45 AM    BILITOT 1.0 03/28/2023 04:45 AM    ALKPHOS 192 03/28/2023 04:45 AM    AST 26 03/28/2023 04:45 AM    ALT 96 03/28/2023 04:45 AM       POC Tests:   Recent Labs     03/28/23  0718   POCGLU 274*       Coags:   Lab Results   Component Value Date/Time    PROTIME 16.7 03/26/2023 05:50 AM    INR 1.35 03/26/2023 05:50 AM       HCG (If Applicable): No results found for: PREGTESTUR, PREGSERUM, HCG, HCGQUANT     ABGs: No results found for: PHART, PO2ART, NXZ1QCB, DAO4NRQ, BEART, W5WSYPXX     Type & Screen (If Applicable):  No results found for: LABABO, LABRH    Drug/Infectious Status (If Applicable):  No results found for: HIV, HEPCAB    COVID-19 Screening (If Applicable): No results found for: COVID19        Anesthesia Evaluation  Patient summary reviewed and Nursing notes reviewed  Airway: Mallampati: III  TM distance: >3 FB   Neck ROM: full  Mouth opening: < 3 FB   Dental:          Pulmonary:   (+) sleep apnea: on CPAP,                             Cardiovascular:  Exercise tolerance: poor (<4 METS),   (+) hypertension:, CAD: non-obstructive, CABG/stent: no interval change, hyperlipidemia                  Neuro/Psych:               GI/Hepatic/Renal:   (+) morbid obesity          Endo/Other:    (+) DiabetesType II DM, well controlled, , hypothyroidism, blood dyscrasia: anticoagulation therapy:., .                  ROS comment: A1C 7.5 Abdominal:             Vascular:   + DVT, . Other Findings:             Anesthesia Plan      general     ASA 3       Induction: intravenous.     MIPS: Postoperative opioids intended, Prophylactic antiemetics

## 2023-03-28 NOTE — ANESTHESIA POSTPROCEDURE EVALUATION
Department of Anesthesiology  Postprocedure Note    Patient: Clarisa Coelho  MRN: [id number]  YOB: 1945  Date of evaluation: 3/28/2023      Procedure Summary     Date: 03/28/23 Room / Location: [address]    Anesthesia Start: 5377 Anesthesia Stop: 5206    Procedure: RIGHT KNEE HARDWARE REMOVAL, PLACEMENT OF ANTIBIOTIC SPACER (Right: Knee) Diagnosis:       Infection associated with internal right knee prosthesis, initial encounter ([facility]      (RIGHT KNEE INFECTION)    Surgeons: Boar Sherman MD Responsible Provider: Denisha Robert MD    Anesthesia Type: general ASA Status: 3          Anesthesia Type: No value filed.     Chino Phase I: Chino Score: 8    [place]ino Phase II:        Anesthesia Post Evaluation    Patient location during evaluation: PACU  Patient participation: complete - patient participated  Level of consciousness: awake and alert  Pain score: 2  Airway patency: patent  Nausea & Vomiting: no vomiting  Complications: no  Cardiovascular status: blood pressure returned to baseline  Respiratory status: acceptable  Hydration status: euvolemic  Multimodal analgesia pain management approach

## 2023-03-28 NOTE — CONSULTS
Attempt to place Picc line unsuccessful r/t obstruction in the chest. Unable to advance Picc catheter to the   SVC. Removed line. Placed 14 cm midline and noted excellent blood return and flushed without difficulty. Pt. Tolerated well.
Infectious Diseases Inpatient Consult Note    Reason for Consult:   R PJI  Requesting Physician:   Dr Dina Wise  Primary Care Physician:  Stevenson Guzman MD  History Obtained From:   Pt, EPIC    Admit Date: 3/25/2023  Hospital Day: 3    CHIEF COMPLAINT:     R knee infection    HISTORY OF PRESENT ILLNESS:      77 yo man  PMH - obesity (BMI 47), DM, HTN, CAD, OA, SANTI, hypothyroid  PSurgHx - R TKA, R TKA 2 stage (space / revision 2013)    L distal fibular fx 1 mo ago  Hx R knee swelling, seen 11/2022, seen by Ortho yet did not f/u  Developed R knee redness, swelling and pain over 2 days  No assoc fever / chills, + noc sweats. No trauma    Seen at [facility]  Transfer to [facility] 3/25  Afeb, WBC 14  Started on Vancomycin, Cefepime  Seen by Ortho 3/26, aspiration, 'thick yellow fluid'  WBC 14,672 - 94%, RBC 6,100.   GS 4+WBC, 1+GPC, cult light S aureus    Today 3/27, T 99.4,   C/o R knee pain with any movement    Past Medical History:    Past Medical History:   Diagnosis Date    CAD (coronary artery disease)     DM (diabetes mellitus), type 2 ([address]     Hypertension        Past Surgical History:    Past Surgical History:   Procedure Laterality Date    CARDIAC CATHETERIZATION      had coronary stenting in 2008    REVISION TOTAL KNEE ARTHROPLASTY Right     REVISION TOTAL KNEE ARTHROPLASTY Right     had infected R knee-had spacer and subsequent revision in 2013       Current Medications:     [START ON 3/28/2023] insulin glargine  40 Units SubCUTAneous QAM    insulin lispro  13 Units SubCUTAneous TID WC    amLODIPine  5 mg Oral Daily    vancomycin  1,250 mg IntraVENous Q24H    [Held by provider] losartan  50 mg Oral Daily    [Held by provider] aspirin  81 mg Oral Daily    atorvastatin  10 mg Oral Daily    levothyroxine  100 mcg Oral Daily    Vitamin D  1,000 Units Oral BID    [Held by provider] clopidogrel  75 mg Oral Daily    metoprolol succinate  50 mg Oral Daily    sodium chloride flush  5-40 mL IntraVENous 2 times per
TRIMMABLE POWER MIDLINE PROCEDURE NOTE  Chart reviewed for allergies, diagnosis, labs, known contraindications, reason for line placement and planned length of treatment. Insertion procedure discussed with patient/family member. Informed consent not required for Midline placement. Three patient identifiers - Patient name,   and MRN -  completed &  confirmed verbally. Hat, mask and eye shield donned. Midline site scrubbed with Chloraprep  One-Step applicator  for 30 seconds x 1. Hand Hygiene  performed with 3% Chlorhexidine surgical scrub x1 min prior to  Sterile gloves, sterile gown being donned. Patient draped using maximal sterile barrier technique ( head to toe ). Midline site scrubbed a 2nd time with Chloraprep One-Step applicator x 30 sec. Vein located by [employer] and site marked with sterile pen. 1% Lidocaine 5 ml injected intradermal pre-insertion. Midline inserted. Positive brisk blood return obtained. Valve applied to lumen. Midline flushed with 10 mls  0.9% Sterile Sodium Chloride. Midline flushes easily with no resistance. Skin prep applied to site. Catheter secured with non-sutured locking device per hospital protocol. Bio-patch/CHG impregnated sterile tegaderm dressing applied. Alcohol Swab Caps applied to valve. Sterile field maintained during procedure. Positioning wire accounted for post procedure. Pt. Response to procedure, tolerated well. Appearance of site: Clean dry and intact without bleeding or edema. All edges of Tegaderm occlusive. Site marked with date and initials of RN placing line. Top 2 side rails in up position, call button in reach, RN notified of all of the above. A Trimmable Power Midline  14  CM placed in the  CHANDANA Cephalic   vein. 0 cm showing from insertion site.
ulcerations, rashes or lesions about the right knee. There is pain to palpation of the right knee. Motor: Intact DF/PF. Sensation: Grossly intact to light touch throughout the right lower extremity. Vascular: 1+ pulse. Labs reviewed:  Recent Labs     03/25/23 2324 03/26/23  0550   WBC 13.4* 14.0*   HGB 15.1 13.9   HCT 45.9 42.7    170     Recent Labs     03/25/23 2324 03/26/23  0550    136   K 4.9 4.7    103   CO2 27 23   BUN 20 20   CREATININE 1.2 1.2   GLUCOSE 198* 229*   CALCIUM 9.5 9.6   MG  --  2.00   PHOS  --  2.8     Recent Labs     03/26/23  0550   INR 1.35*   PROTIME 16.7*       No results found for: COLORU, CLARITYU, PH, PHUR, LABSPEC, GLUCOSEU, BLOODU, LEUKOCYTESUR, NITRITE, BILIRUBINUR, UROBILINOGEN, RBCUA, WBCUA, BACTERIA, AMORPHOUS, CRYSTAL    Imaging:  I independently evaluated and interpreted the following images    Right knee: There is a revision right total knee replacement in place there is evidence of loosening of the implant on the femoral as well as on the tibial side. There is fragmentation of the kneecap. As well as tilting    IMPRESSION:  38-year-old male with a chronic prosthetic joint infection with concern for development of a sinus tract. RECOMMENDATIONS:  Plan of care:   Weight bearing status- as tolerated  Pain control- Tylenol 1000mg TID, toradol x 48 hrs, oxycodone PRN   Antibiotics-broad-spectrum per medical team  DVT ppx-Lovenox 40 daily hold prior to surgery  Dispo-pending surgery. We discussed the diagnosis and treatment options in detail with the patient. I explained the nature of his chronic knee infection and poor outcomes with nonoperative treatment  We discussed the surgical procedure, recovery period, and protocol for pain management, expected post-operative course in detail. I did explain to him that given his recurrent infections that eradication of his infection potential is hard to predict.   However he would proceed with likely

## 2023-03-29 PROBLEM — T84.53XA INFECTION OF TOTAL RIGHT KNEE REPLACEMENT (HCC): Status: ACTIVE | Noted: 2023-03-29

## 2023-03-29 PROBLEM — B95.62 MRSA BACTEREMIA: Status: ACTIVE | Noted: 2023-03-29

## 2023-03-29 PROBLEM — A49.01 STAPHYLOCOCCUS AUREUS INFECTION: Status: ACTIVE | Noted: 2023-03-29

## 2023-03-29 PROBLEM — R78.81 MRSA BACTEREMIA: Status: ACTIVE | Noted: 2023-03-29

## 2023-03-29 LAB
BACTERIA FLD AEROBE CULT: ABNORMAL
EST. AVERAGE GLUCOSE BLD GHB EST-MCNC: 162.8 MG/DL
GLUCOSE BLD-MCNC: 236 MG/DL (ref 70–99)
GLUCOSE BLD-MCNC: 271 MG/DL (ref 70–99)
GLUCOSE BLD-MCNC: 308 MG/DL (ref 70–99)
GLUCOSE BLD-MCNC: 49 MG/DL (ref 70–99)
GLUCOSE BLD-MCNC: 49 MG/DL (ref 70–99)
GLUCOSE BLD-MCNC: 61 MG/DL (ref 70–99)
GLUCOSE BLD-MCNC: 82 MG/DL (ref 70–99)
GRAM STN SPEC: ABNORMAL
HBA1C MFR BLD: 7.3 %
LV EF: 55 %
LVEF MODALITY: NORMAL
ORGANISM: ABNORMAL
PERFORMED ON: ABNORMAL
PERFORMED ON: NORMAL

## 2023-03-29 PROCEDURE — 6360000002 HC RX W HCPCS: Performed by: INTERNAL MEDICINE

## 2023-03-29 PROCEDURE — 97530 THERAPEUTIC ACTIVITIES: CPT

## 2023-03-29 PROCEDURE — 97161 PT EVAL LOW COMPLEX 20 MIN: CPT

## 2023-03-29 PROCEDURE — 36415 COLL VENOUS BLD VENIPUNCTURE: CPT

## 2023-03-29 PROCEDURE — 6360000004 HC RX CONTRAST MEDICATION: Performed by: PHYSICIAN ASSISTANT

## 2023-03-29 PROCEDURE — 6370000000 HC RX 637 (ALT 250 FOR IP): Performed by: INTERNAL MEDICINE

## 2023-03-29 PROCEDURE — C9113 INJ PANTOPRAZOLE SODIUM, VIA: HCPCS | Performed by: PHYSICIAN ASSISTANT

## 2023-03-29 PROCEDURE — 2580000003 HC RX 258: Performed by: INTERNAL MEDICINE

## 2023-03-29 PROCEDURE — 6370000000 HC RX 637 (ALT 250 FOR IP): Performed by: NURSE PRACTITIONER

## 2023-03-29 PROCEDURE — C8929 TTE W OR WO FOL WCON,DOPPLER: HCPCS

## 2023-03-29 PROCEDURE — APPNB45 APP NON BILLABLE 31-45 MINUTES: Performed by: NURSE PRACTITIONER

## 2023-03-29 PROCEDURE — 87040 BLOOD CULTURE FOR BACTERIA: CPT

## 2023-03-29 PROCEDURE — 6360000002 HC RX W HCPCS: Performed by: PHYSICIAN ASSISTANT

## 2023-03-29 PROCEDURE — 99232 SBSQ HOSP IP/OBS MODERATE 35: CPT | Performed by: INTERNAL MEDICINE

## 2023-03-29 PROCEDURE — 6370000000 HC RX 637 (ALT 250 FOR IP): Performed by: PHYSICIAN ASSISTANT

## 2023-03-29 PROCEDURE — 97166 OT EVAL MOD COMPLEX 45 MIN: CPT

## 2023-03-29 PROCEDURE — 1200000000 HC SEMI PRIVATE

## 2023-03-29 PROCEDURE — 83036 HEMOGLOBIN GLYCOSYLATED A1C: CPT

## 2023-03-29 RX ORDER — INSULIN LISPRO 100 [IU]/ML
0-16 INJECTION, SOLUTION INTRAVENOUS; SUBCUTANEOUS
Status: DISCONTINUED | OUTPATIENT
Start: 2023-03-29 | End: 2023-03-30

## 2023-03-29 RX ORDER — PANTOPRAZOLE SODIUM 40 MG/1
40 TABLET, DELAYED RELEASE ORAL
Status: DISCONTINUED | OUTPATIENT
Start: 2023-03-30 | End: 2023-04-04 | Stop reason: HOSPADM

## 2023-03-29 RX ORDER — ASPIRIN 81 MG/1
81 TABLET ORAL 2 TIMES DAILY
Qty: 60 TABLET | Refills: 0 | Status: SHIPPED | OUTPATIENT
Start: 2023-03-29 | End: 2023-04-28

## 2023-03-29 RX ORDER — INSULIN GLARGINE 100 [IU]/ML
50 INJECTION, SOLUTION SUBCUTANEOUS EVERY MORNING
Status: DISCONTINUED | OUTPATIENT
Start: 2023-03-30 | End: 2023-03-30

## 2023-03-29 RX ORDER — ACETAMINOPHEN 500 MG
1000 TABLET ORAL 3 TIMES DAILY
Qty: 84 TABLET | Refills: 0 | Status: SHIPPED | OUTPATIENT
Start: 2023-03-29 | End: 2023-04-12

## 2023-03-29 RX ORDER — INSULIN LISPRO 100 [IU]/ML
16 INJECTION, SOLUTION INTRAVENOUS; SUBCUTANEOUS
Status: DISCONTINUED | OUTPATIENT
Start: 2023-03-30 | End: 2023-03-30

## 2023-03-29 RX ORDER — OXYCODONE HYDROCHLORIDE 5 MG/1
5 TABLET ORAL EVERY 4 HOURS PRN
Qty: 42 TABLET | Refills: 0 | Status: SHIPPED | OUTPATIENT
Start: 2023-03-29 | End: 2023-04-05

## 2023-03-29 RX ADMIN — Medication 1000 UNITS: at 08:29

## 2023-03-29 RX ADMIN — PERFLUTREN 1.5 ML: 6.52 INJECTION, SUSPENSION INTRAVENOUS at 07:45

## 2023-03-29 RX ADMIN — INSULIN LISPRO 12 UNITS: 100 INJECTION, SOLUTION INTRAVENOUS; SUBCUTANEOUS at 13:07

## 2023-03-29 RX ADMIN — Medication 1000 UNITS: at 20:25

## 2023-03-29 RX ADMIN — METOPROLOL SUCCINATE 50 MG: 50 TABLET, EXTENDED RELEASE ORAL at 08:29

## 2023-03-29 RX ADMIN — INSULIN LISPRO 13 UNITS: 100 INJECTION, SOLUTION INTRAVENOUS; SUBCUTANEOUS at 13:07

## 2023-03-29 RX ADMIN — INSULIN LISPRO 13 UNITS: 100 INJECTION, SOLUTION INTRAVENOUS; SUBCUTANEOUS at 08:31

## 2023-03-29 RX ADMIN — INSULIN GLARGINE 40 UNITS: 100 INJECTION, SOLUTION SUBCUTANEOUS at 08:32

## 2023-03-29 RX ADMIN — INSULIN LISPRO 4 UNITS: 100 INJECTION, SOLUTION INTRAVENOUS; SUBCUTANEOUS at 08:31

## 2023-03-29 RX ADMIN — VANCOMYCIN HYDROCHLORIDE 1250 MG: 10 INJECTION, POWDER, LYOPHILIZED, FOR SOLUTION INTRAVENOUS at 17:35

## 2023-03-29 RX ADMIN — LEVOTHYROXINE SODIUM 100 MCG: 100 TABLET ORAL at 05:47

## 2023-03-29 RX ADMIN — ANTACID TABLETS 500 MG: 500 TABLET, CHEWABLE ORAL at 13:06

## 2023-03-29 RX ADMIN — Medication 10 ML: at 20:28

## 2023-03-29 RX ADMIN — ENOXAPARIN SODIUM 30 MG: 100 INJECTION SUBCUTANEOUS at 20:25

## 2023-03-29 RX ADMIN — ANTACID TABLETS 500 MG: 500 TABLET, CHEWABLE ORAL at 18:07

## 2023-03-29 RX ADMIN — ENOXAPARIN SODIUM 30 MG: 100 INJECTION SUBCUTANEOUS at 08:30

## 2023-03-29 RX ADMIN — ATORVASTATIN CALCIUM 10 MG: 10 TABLET, FILM COATED ORAL at 08:29

## 2023-03-29 RX ADMIN — INSULIN LISPRO 4 UNITS: 100 INJECTION, SOLUTION INTRAVENOUS; SUBCUTANEOUS at 18:05

## 2023-03-29 RX ADMIN — AMLODIPINE BESYLATE 10 MG: 5 TABLET ORAL at 08:29

## 2023-03-29 RX ADMIN — PANTOPRAZOLE SODIUM 40 MG: 40 INJECTION, POWDER, FOR SOLUTION INTRAVENOUS at 08:29

## 2023-03-29 ASSESSMENT — PAIN SCALES - GENERAL
PAINLEVEL_OUTOF10: 0

## 2023-03-29 NOTE — FLOWSHEET NOTE
Assessment completed per flow sheet. Pt with right leg immobilizer in place with ace wrap on. Pedal pulse +1. Left leg with boot on. Coombs cath draining isma cl urine. POC discussed with pt for night and all questions answered.

## 2023-03-29 NOTE — DISCHARGE INSTR - COC
Respiratory Treatments: CPAP for sleep  Oxygen Therapy:  is not on home oxygen therapy. Ventilator:    - No ventilator support    Rehab Therapies: Physical Therapy and Occupational Therapy  Weight Bearing Status/Restrictions: Non-weight bearing right lower extremities,Right (knee immobilizer)     [x] Left LE (boot  Other Medical Equipment (for information only, NOT a DME order):  wheelchair  Other Treatments: NONE    Patient's personal belongings (please select all that are sent with patient): BPAP    RN SIGNATURE:  Electronically signed by Pepe Loving RN on 4/4/23 at 8:28 AM EDT    CASE MANAGEMENT/SOCIAL WORK SECTION    Inpatient Status Date: 03/25/2023    Readmission Risk Assessment Score:  Readmission Risk              Risk of Unplanned Readmission:  13           Discharging to Facility/ Agency   Name: [facility] [place] [facility]  Address: [address] [place] [address]. [facility], [address]  Phone: [phone]  Fax: [phone]      / signature: Electronically signed by Hailee Morales RN on 4/4/23 at 8:57 AM EDT    PHYSICIAN SECTION    Prognosis: Fair    Condition at Discharge: Stable    Rehab Potential (if transferring to Rehab): Fair    Recommended Labs or Other Treatments After Discharge:   INFUSION ORDERS:  Daptomycin 750 mg iv daily through 5/9/23  - Diagnosis - MRSA bacteremia, R knee PJI infection  - First dose given in hospital  - PICC   - Disposition / date discharge  - Check CBC w diff, CMP, ESR, CRP, CK every Mon or Tue - FAX result to [phone]  - Call with antibiotic / infusion issues, [phone]  - Call with any change in status, transfer in or out of a facility or to hospital - [phone]  - No f/u in outpatient ID office necessary    Physician Certification: I certify the above information and transfer of Phil Cordova  is necessary for the continuing treatment of the diagnosis listed and that he requires Josep Carter for greater 30 days.      Update

## 2023-03-29 NOTE — OP NOTE
Patient: Vineet Sullivan                    : 1945     MRN: [id number]     Date: 23     SURGEON: Shilpi Olivares MD     ASSISTANT:     PREOPERATIVE DIAGNOSES:     1) Chronic right prosthetic joint infection    POSTOPERATIVE DIAGNOSES: Same     PROCEDURES: Right knee explant/removal of hardware, insertion of antibiotic spacer    ANESTHESIA: General    COMPLICATIONS: None    ESTIMATED BLOOD LOSS: ~100cc    SPECIMENS: culture x 3    DRAINS: none    TOURNIQUET TIME:  120 minutes    IMPLANTS USED:   Implant Name Type Inv. Item Serial No.  Lot No. LRB No. Used Action   CEMENT BNE 40GM HI VISC RADPQ FOR REV SURG - [id number]  CEMENT BNE 40GM HI VISC RADPQ FOR REV SURG  [employer] [id number] Right 4 Implanted   NAIL IM L34CM [id number] UNIV TIB YEL TI ALLY JACOB NAIL - [id number]  NAIL IM L34CM [id number] UNIV TIB YEL TI ALLY JACOB NAIL  [employer] Zoë Elder [id number] Right 1 Implanted        INDICATIONS: Arminda Torres is a 76 y.o. male with a complex history of right knee are arthroplasty. He had a prior two-stage exchange for infection reportedly was doing well off of antibiotics however presented to the ED several days ago with concern for developing sinus tract and chronic infection. He was transferred to our hospital and evaluated where it was clear he was developing a sinus tract and had a grossly loose patellar component. As well as a patellar fracture. His implants did not appear grossly loose however there was some lucency underneath the baseplate. Had a long discussion with him about infection and the consequences of untreated infection. I explained that proceeding with a staged approach would allow him the best ability to eradicate the infection however I did discuss the morbidity to the procedure which she was understanding.   However I do not believe that given the organism being MRSA as well as his development of a sinus tract that is simple irrigation

## 2023-03-30 ENCOUNTER — CLINICAL DOCUMENTATION (OUTPATIENT)
Dept: INFECTIOUS DISEASES | Age: 78
End: 2023-03-30

## 2023-03-30 LAB
ALBUMIN SERPL-MCNC: 2.6 G/DL (ref 3.4–5)
ALBUMIN/GLOB SERPL: 0.9 {RATIO} (ref 1.1–2.2)
ALP SERPL-CCNC: 145 U/L (ref 40–129)
ALT SERPL-CCNC: 35 U/L (ref 10–40)
ANION GAP SERPL CALCULATED.3IONS-SCNC: 5 MMOL/L (ref 3–16)
AST SERPL-CCNC: 25 U/L (ref 15–37)
BACTERIA BLD CULT ORG #2: ABNORMAL
BACTERIA BLD CULT ORG #2: ABNORMAL
BACTERIA BLD CULT: ABNORMAL
BASOPHILS # BLD: 0 K/UL (ref 0–0.2)
BASOPHILS NFR BLD: 0 %
BILIRUB SERPL-MCNC: 0.4 MG/DL (ref 0–1)
BUN SERPL-MCNC: 33 MG/DL (ref 7–20)
CALCIUM SERPL-MCNC: 8.4 MG/DL (ref 8.3–10.6)
CHLORIDE SERPL-SCNC: 108 MMOL/L (ref 99–110)
CK SERPL-CCNC: 32 U/L (ref 39–308)
CO2 SERPL-SCNC: 28 MMOL/L (ref 21–32)
CREAT SERPL-MCNC: 1.1 MG/DL (ref 0.8–1.3)
DEPRECATED RDW RBC AUTO: 14.8 % (ref 12.4–15.4)
EOSINOPHIL # BLD: 0 K/UL (ref 0–0.6)
EOSINOPHIL NFR BLD: 0 %
GFR SERPLBLD CREATININE-BSD FMLA CKD-EPI: >60 ML/MIN/{1.73_M2}
GLUCOSE BLD-MCNC: 101 MG/DL (ref 70–99)
GLUCOSE BLD-MCNC: 144 MG/DL (ref 70–99)
GLUCOSE BLD-MCNC: 156 MG/DL (ref 70–99)
GLUCOSE BLD-MCNC: 158 MG/DL (ref 70–99)
GLUCOSE SERPL-MCNC: 165 MG/DL (ref 70–99)
HCT VFR BLD AUTO: 35.6 % (ref 40.5–52.5)
HGB BLD-MCNC: 11.6 G/DL (ref 13.5–17.5)
LYMPHOCYTES # BLD: 2 K/UL (ref 1–5.1)
LYMPHOCYTES NFR BLD: 21 %
MAGNESIUM SERPL-MCNC: 2.3 MG/DL (ref 1.8–2.4)
MCH RBC QN AUTO: 30.6 PG (ref 26–34)
MCHC RBC AUTO-ENTMCNC: 32.6 G/DL (ref 31–36)
MCV RBC AUTO: 94 FL (ref 80–100)
MONOCYTES # BLD: 0.7 K/UL (ref 0–1.3)
MONOCYTES NFR BLD: 8 %
NEUTROPHILS # BLD: 6.6 K/UL (ref 1.7–7.7)
NEUTROPHILS NFR BLD: 64 %
NEUTS BAND NFR BLD MANUAL: 7 % (ref 0–7)
ORGANISM: ABNORMAL
PERFORMED ON: ABNORMAL
PHOSPHATE SERPL-MCNC: 1.4 MG/DL (ref 2.5–4.9)
PLATELET # BLD AUTO: 199 K/UL (ref 135–450)
PLATELET BLD QL SMEAR: ADEQUATE
PMV BLD AUTO: 8.6 FL (ref 5–10.5)
POTASSIUM SERPL-SCNC: 4.5 MMOL/L (ref 3.5–5.1)
PROT SERPL-MCNC: 5.5 G/DL (ref 6.4–8.2)
RBC # BLD AUTO: 3.79 M/UL (ref 4.2–5.9)
RBC MORPH BLD: NORMAL
SLIDE REVIEW: ABNORMAL
SODIUM SERPL-SCNC: 141 MMOL/L (ref 136–145)
TSH SERPL DL<=0.005 MIU/L-ACNC: 0.47 UIU/ML (ref 0.27–4.2)
WBC # BLD AUTO: 9.3 K/UL (ref 4–11)

## 2023-03-30 PROCEDURE — 2580000003 HC RX 258: Performed by: INTERNAL MEDICINE

## 2023-03-30 PROCEDURE — 99024 POSTOP FOLLOW-UP VISIT: CPT | Performed by: NURSE PRACTITIONER

## 2023-03-30 PROCEDURE — 85025 COMPLETE CBC W/AUTO DIFF WBC: CPT

## 2023-03-30 PROCEDURE — 1200000000 HC SEMI PRIVATE

## 2023-03-30 PROCEDURE — 97112 NEUROMUSCULAR REEDUCATION: CPT

## 2023-03-30 PROCEDURE — 6370000000 HC RX 637 (ALT 250 FOR IP): Performed by: NURSE PRACTITIONER

## 2023-03-30 PROCEDURE — 80053 COMPREHEN METABOLIC PANEL: CPT

## 2023-03-30 PROCEDURE — 82550 ASSAY OF CK (CPK): CPT

## 2023-03-30 PROCEDURE — 97530 THERAPEUTIC ACTIVITIES: CPT

## 2023-03-30 PROCEDURE — 6360000002 HC RX W HCPCS: Performed by: INTERNAL MEDICINE

## 2023-03-30 PROCEDURE — 27488 REMOVAL OF KNEE PROSTHESIS: CPT | Performed by: ORTHOPAEDIC SURGERY

## 2023-03-30 PROCEDURE — 99232 SBSQ HOSP IP/OBS MODERATE 35: CPT | Performed by: INTERNAL MEDICINE

## 2023-03-30 PROCEDURE — 11981 INSERTION DRUG DLVR IMPLANT: CPT | Performed by: ORTHOPAEDIC SURGERY

## 2023-03-30 PROCEDURE — 6370000000 HC RX 637 (ALT 250 FOR IP): Performed by: PHYSICIAN ASSISTANT

## 2023-03-30 PROCEDURE — 6370000000 HC RX 637 (ALT 250 FOR IP): Performed by: INTERNAL MEDICINE

## 2023-03-30 PROCEDURE — 97110 THERAPEUTIC EXERCISES: CPT

## 2023-03-30 PROCEDURE — 97535 SELF CARE MNGMENT TRAINING: CPT

## 2023-03-30 PROCEDURE — APPNB45 APP NON BILLABLE 31-45 MINUTES: Performed by: NURSE PRACTITIONER

## 2023-03-30 PROCEDURE — 84100 ASSAY OF PHOSPHORUS: CPT

## 2023-03-30 PROCEDURE — 83735 ASSAY OF MAGNESIUM: CPT

## 2023-03-30 PROCEDURE — 84443 ASSAY THYROID STIM HORMONE: CPT

## 2023-03-30 PROCEDURE — 2500000003 HC RX 250 WO HCPCS: Performed by: INTERNAL MEDICINE

## 2023-03-30 RX ORDER — INSULIN LISPRO 100 [IU]/ML
10 INJECTION, SOLUTION INTRAVENOUS; SUBCUTANEOUS
Status: DISCONTINUED | OUTPATIENT
Start: 2023-03-30 | End: 2023-03-31

## 2023-03-30 RX ORDER — INSULIN LISPRO 100 [IU]/ML
0-4 INJECTION, SOLUTION INTRAVENOUS; SUBCUTANEOUS NIGHTLY
Status: DISCONTINUED | OUTPATIENT
Start: 2023-03-30 | End: 2023-04-04 | Stop reason: HOSPADM

## 2023-03-30 RX ORDER — INSULIN GLARGINE 100 [IU]/ML
40 INJECTION, SOLUTION SUBCUTANEOUS EVERY MORNING
Status: DISCONTINUED | OUTPATIENT
Start: 2023-03-31 | End: 2023-03-31

## 2023-03-30 RX ORDER — INSULIN LISPRO 100 [IU]/ML
0-8 INJECTION, SOLUTION INTRAVENOUS; SUBCUTANEOUS
Status: DISCONTINUED | OUTPATIENT
Start: 2023-03-30 | End: 2023-04-04 | Stop reason: HOSPADM

## 2023-03-30 RX ADMIN — INSULIN LISPRO 16 UNITS: 100 INJECTION, SOLUTION INTRAVENOUS; SUBCUTANEOUS at 08:49

## 2023-03-30 RX ADMIN — Medication 1000 UNITS: at 08:48

## 2023-03-30 RX ADMIN — ATORVASTATIN CALCIUM 10 MG: 10 TABLET, FILM COATED ORAL at 08:48

## 2023-03-30 RX ADMIN — ENOXAPARIN SODIUM 30 MG: 100 INJECTION SUBCUTANEOUS at 08:48

## 2023-03-30 RX ADMIN — CLOPIDOGREL BISULFATE 75 MG: 75 TABLET ORAL at 08:48

## 2023-03-30 RX ADMIN — PANTOPRAZOLE SODIUM 40 MG: 40 TABLET, DELAYED RELEASE ORAL at 06:04

## 2023-03-30 RX ADMIN — METOPROLOL SUCCINATE 50 MG: 50 TABLET, EXTENDED RELEASE ORAL at 08:48

## 2023-03-30 RX ADMIN — ANTACID TABLETS 500 MG: 500 TABLET, CHEWABLE ORAL at 08:48

## 2023-03-30 RX ADMIN — ANTACID TABLETS 500 MG: 500 TABLET, CHEWABLE ORAL at 17:10

## 2023-03-30 RX ADMIN — LEVOTHYROXINE SODIUM 100 MCG: 100 TABLET ORAL at 06:04

## 2023-03-30 RX ADMIN — Medication 1000 UNITS: at 21:20

## 2023-03-30 RX ADMIN — ASPIRIN 81 MG: 81 TABLET, COATED ORAL at 08:48

## 2023-03-30 RX ADMIN — INSULIN GLARGINE 50 UNITS: 100 INJECTION, SOLUTION SUBCUTANEOUS at 08:48

## 2023-03-30 RX ADMIN — AMLODIPINE BESYLATE 10 MG: 5 TABLET ORAL at 08:48

## 2023-03-30 RX ADMIN — VANCOMYCIN HYDROCHLORIDE 1250 MG: 10 INJECTION, POWDER, LYOPHILIZED, FOR SOLUTION INTRAVENOUS at 15:01

## 2023-03-30 RX ADMIN — ENOXAPARIN SODIUM 30 MG: 100 INJECTION SUBCUTANEOUS at 21:20

## 2023-03-30 RX ADMIN — POTASSIUM PHOSPHATE, MONOBASIC AND POTASSIUM PHOSPHATE, DIBASIC 30 MMOL: 224; 236 INJECTION, SOLUTION, CONCENTRATE INTRAVENOUS at 14:06

## 2023-03-30 NOTE — PROGRESS NOTES
OPAT Nurse Coordinator Assessment Note      Primary ID Diagnosis:  MRSA bacteremia, R knee PJI infection   ID Provider: Dr. Luciano De León      IV Antimicrobial Therapy Plan:  Daptomycin 750 mg IV daily  IV Access: midline, getting PICC today in IR    Family Support: spouse Rosario Fischer (family, neighbors)     Contact information: see [facility] demographics    Outpatient services (including home infusion, home health, facility referral: See recent case management/social work note for finalization of services    ID follow up appointment:  n/a       Patient Assessment    I spoke with patient at bedside and completed an initial assessment and education on OPAT program. I explained the OPAT program and its service (IV antimicrobial management, lab monitoring, follow up appointments reminders, etc.) to the patient. Patient states family cannot help with IV antimicrobial administration at home and he will be going to a SNF. After this discussion, the patient appeared to be a good candidate for the OPAT program.     Patient was educated about how PICC line is placed, s/s of infection at the PICC line insertion site, how to keep the PICC line dressing dry while bathing, weightlifting limitations of 10 pounds or more, avoiding intense physical work and any repetitive motion in arm the PICC line was placed and how the PICC line dressing must be changed each week along with weekly lab monitoring. Patient was given information about IV antimicrobials including possible administration options (continuous, IV push, number of infusions) and estimated duration of therapy. Patient verbalized understanding and provide teach back on assessment discussion stated above. Patient agreed for the OPAT team to leave messages on their cell phone. OPAT program packet including contacts were left with patient in the event they would have questions or concerns.  The OPAT nurse coordinator will continue to follow the patient peripherally for any changes

## 2023-03-31 ENCOUNTER — APPOINTMENT (OUTPATIENT)
Dept: INTERVENTIONAL RADIOLOGY/VASCULAR | Age: 78
DRG: 485 | End: 2023-03-31
Attending: FAMILY MEDICINE
Payer: MEDICARE

## 2023-03-31 LAB
ALBUMIN SERPL-MCNC: 2.4 G/DL (ref 3.4–5)
ALBUMIN/GLOB SERPL: 0.8 {RATIO} (ref 1.1–2.2)
ALP SERPL-CCNC: 150 U/L (ref 40–129)
ALT SERPL-CCNC: 36 U/L (ref 10–40)
ANION GAP SERPL CALCULATED.3IONS-SCNC: 6 MMOL/L (ref 3–16)
AST SERPL-CCNC: 30 U/L (ref 15–37)
BASOPHILS # BLD: 0.2 K/UL (ref 0–0.2)
BASOPHILS NFR BLD: 1.9 %
BILIRUB SERPL-MCNC: 0.5 MG/DL (ref 0–1)
BUN SERPL-MCNC: 24 MG/DL (ref 7–20)
CALCIUM SERPL-MCNC: 8.7 MG/DL (ref 8.3–10.6)
CHLORIDE SERPL-SCNC: 105 MMOL/L (ref 99–110)
CO2 SERPL-SCNC: 28 MMOL/L (ref 21–32)
CREAT SERPL-MCNC: 0.9 MG/DL (ref 0.8–1.3)
DEPRECATED RDW RBC AUTO: 14.9 % (ref 12.4–15.4)
EOSINOPHIL # BLD: 0.4 K/UL (ref 0–0.6)
EOSINOPHIL NFR BLD: 5 %
GFR SERPLBLD CREATININE-BSD FMLA CKD-EPI: >60 ML/MIN/{1.73_M2}
GLUCOSE BLD-MCNC: 102 MG/DL (ref 70–99)
GLUCOSE BLD-MCNC: 106 MG/DL (ref 70–99)
GLUCOSE BLD-MCNC: 54 MG/DL (ref 70–99)
GLUCOSE BLD-MCNC: 76 MG/DL (ref 70–99)
GLUCOSE BLD-MCNC: 80 MG/DL (ref 70–99)
GLUCOSE BLD-MCNC: 90 MG/DL (ref 70–99)
GLUCOSE SERPL-MCNC: 84 MG/DL (ref 70–99)
HCT VFR BLD AUTO: 37.2 % (ref 40.5–52.5)
HGB BLD-MCNC: 12.2 G/DL (ref 13.5–17.5)
LYMPHOCYTES # BLD: 1.2 K/UL (ref 1–5.1)
LYMPHOCYTES NFR BLD: 14.6 %
MAGNESIUM SERPL-MCNC: 2.2 MG/DL (ref 1.8–2.4)
MCH RBC QN AUTO: 30.9 PG (ref 26–34)
MCHC RBC AUTO-ENTMCNC: 32.8 G/DL (ref 31–36)
MCV RBC AUTO: 94.1 FL (ref 80–100)
MONOCYTES # BLD: 0.7 K/UL (ref 0–1.3)
MONOCYTES NFR BLD: 9.1 %
NEUTROPHILS # BLD: 5.5 K/UL (ref 1.7–7.7)
NEUTROPHILS NFR BLD: 69.4 %
PERFORMED ON: ABNORMAL
PERFORMED ON: NORMAL
PHOSPHATE SERPL-MCNC: 2 MG/DL (ref 2.5–4.9)
PLATELET # BLD AUTO: 190 K/UL (ref 135–450)
PMV BLD AUTO: 8.6 FL (ref 5–10.5)
POTASSIUM SERPL-SCNC: 4.3 MMOL/L (ref 3.5–5.1)
PROT SERPL-MCNC: 5.5 G/DL (ref 6.4–8.2)
RBC # BLD AUTO: 3.96 M/UL (ref 4.2–5.9)
SODIUM SERPL-SCNC: 139 MMOL/L (ref 136–145)
WBC # BLD AUTO: 7.9 K/UL (ref 4–11)

## 2023-03-31 PROCEDURE — 76937 US GUIDE VASCULAR ACCESS: CPT

## 2023-03-31 PROCEDURE — 2500000003 HC RX 250 WO HCPCS: Performed by: INTERNAL MEDICINE

## 2023-03-31 PROCEDURE — 2580000003 HC RX 258: Performed by: INTERNAL MEDICINE

## 2023-03-31 PROCEDURE — 6370000000 HC RX 637 (ALT 250 FOR IP): Performed by: INTERNAL MEDICINE

## 2023-03-31 PROCEDURE — APPNB45 APP NON BILLABLE 31-45 MINUTES: Performed by: NURSE PRACTITIONER

## 2023-03-31 PROCEDURE — 1200000000 HC SEMI PRIVATE

## 2023-03-31 PROCEDURE — 80053 COMPREHEN METABOLIC PANEL: CPT

## 2023-03-31 PROCEDURE — C1751 CATH, INF, PER/CENT/MIDLINE: HCPCS

## 2023-03-31 PROCEDURE — 6370000000 HC RX 637 (ALT 250 FOR IP): Performed by: NURSE PRACTITIONER

## 2023-03-31 PROCEDURE — 85025 COMPLETE CBC W/AUTO DIFF WBC: CPT

## 2023-03-31 PROCEDURE — 99024 POSTOP FOLLOW-UP VISIT: CPT | Performed by: NURSE PRACTITIONER

## 2023-03-31 PROCEDURE — 84100 ASSAY OF PHOSPHORUS: CPT

## 2023-03-31 PROCEDURE — 6370000000 HC RX 637 (ALT 250 FOR IP): Performed by: PHYSICIAN ASSISTANT

## 2023-03-31 PROCEDURE — 6360000002 HC RX W HCPCS: Performed by: INTERNAL MEDICINE

## 2023-03-31 PROCEDURE — 99232 SBSQ HOSP IP/OBS MODERATE 35: CPT | Performed by: INTERNAL MEDICINE

## 2023-03-31 PROCEDURE — 36558 INSERT TUNNELED CV CATH: CPT

## 2023-03-31 PROCEDURE — 02HV33Z INSERTION OF INFUSION DEVICE INTO SUPERIOR VENA CAVA, PERCUTANEOUS APPROACH: ICD-10-PCS | Performed by: STUDENT IN AN ORGANIZED HEALTH CARE EDUCATION/TRAINING PROGRAM

## 2023-03-31 PROCEDURE — 83735 ASSAY OF MAGNESIUM: CPT

## 2023-03-31 PROCEDURE — 77001 FLUOROGUIDE FOR VEIN DEVICE: CPT

## 2023-03-31 RX ORDER — INSULIN GLARGINE 100 [IU]/ML
30 INJECTION, SOLUTION SUBCUTANEOUS EVERY MORNING
Status: DISCONTINUED | OUTPATIENT
Start: 2023-04-01 | End: 2023-04-01

## 2023-03-31 RX ORDER — LIDOCAINE HYDROCHLORIDE 10 MG/ML
10 INJECTION, SOLUTION EPIDURAL; INFILTRATION; INTRACAUDAL; PERINEURAL ONCE
Status: COMPLETED | OUTPATIENT
Start: 2023-03-31 | End: 2023-03-31

## 2023-03-31 RX ORDER — INSULIN LISPRO 100 [IU]/ML
5 INJECTION, SOLUTION INTRAVENOUS; SUBCUTANEOUS
Status: DISCONTINUED | OUTPATIENT
Start: 2023-03-31 | End: 2023-04-03

## 2023-03-31 RX ORDER — LIDOCAINE HYDROCHLORIDE AND EPINEPHRINE 10; 10 MG/ML; UG/ML
10 INJECTION, SOLUTION INFILTRATION; PERINEURAL ONCE
Status: COMPLETED | OUTPATIENT
Start: 2023-03-31 | End: 2023-03-31

## 2023-03-31 RX ADMIN — SODIUM CHLORIDE 30 MMOL: 9 INJECTION, SOLUTION INTRAVENOUS at 10:30

## 2023-03-31 RX ADMIN — DEXTROSE MONOHYDRATE 125 ML: 100 INJECTION, SOLUTION INTRAVENOUS at 06:59

## 2023-03-31 RX ADMIN — ENOXAPARIN SODIUM 30 MG: 100 INJECTION SUBCUTANEOUS at 20:59

## 2023-03-31 RX ADMIN — Medication 1000 UNITS: at 21:13

## 2023-03-31 RX ADMIN — METOPROLOL SUCCINATE 50 MG: 50 TABLET, EXTENDED RELEASE ORAL at 08:27

## 2023-03-31 RX ADMIN — ANTACID TABLETS 500 MG: 500 TABLET, CHEWABLE ORAL at 15:10

## 2023-03-31 RX ADMIN — Medication 1000 UNITS: at 08:26

## 2023-03-31 RX ADMIN — AMLODIPINE BESYLATE 10 MG: 5 TABLET ORAL at 08:26

## 2023-03-31 RX ADMIN — Medication 10 ML: at 21:14

## 2023-03-31 RX ADMIN — LIDOCAINE HYDROCHLORIDE,EPINEPHRINE BITARTRATE 10 ML: 10; .01 INJECTION, SOLUTION INFILTRATION; PERINEURAL at 12:37

## 2023-03-31 RX ADMIN — ACETAMINOPHEN 650 MG: 325 TABLET ORAL at 21:00

## 2023-03-31 RX ADMIN — ATORVASTATIN CALCIUM 10 MG: 10 TABLET, FILM COATED ORAL at 08:26

## 2023-03-31 RX ADMIN — LIDOCAINE HYDROCHLORIDE 10 ML: 10 INJECTION, SOLUTION EPIDURAL; INFILTRATION; INTRACAUDAL; PERINEURAL at 12:37

## 2023-03-31 RX ADMIN — VANCOMYCIN HYDROCHLORIDE 1250 MG: 10 INJECTION, POWDER, LYOPHILIZED, FOR SOLUTION INTRAVENOUS at 15:07

## 2023-03-31 NOTE — PRE SEDATION
Sedation Pre-Procedure Note    Patient Name: Burgess Wood   YOB: 1945  Room/Bed: [facility], [place]-6302/1892-91  Medical Record Number: [id number]  Date: 3/31/2023   Time: 11:53 AM       Indication:  Tunneled central line for IV antibiotics    Consent: I have discussed with the patient and/or the patient representative the indication, alternatives, and the possible risks and/or complications of the planned procedure and the anesthesia methods. The patient and/or patient representative appear to understand and agree to proceed. Vital Signs:   Vitals:    03/31/23 0825   BP: 112/68   Pulse: 63   Resp: 16   Temp: 98.3 °F (36.8 °C)   SpO2: 91%       Past Medical History:   has a past medical history of CAD (coronary artery disease), DM (diabetes mellitus), type 2 ([address] and Hypertension. Past Surgical History:   has a past surgical history that includes Cardiac catheterization; Revision total knee arthroplasty (Right); Revision total knee arthroplasty (Right); and Revision total knee arthroplasty (Right, 3/28/2023).     Medications:   Scheduled Meds:    potassium phosphate IVPB  30 mmol IntraVENous Once    insulin glargine  40 Units SubCUTAneous QAM    insulin lispro  10 Units SubCUTAneous TID WC    insulin lispro  0-8 Units SubCUTAneous TID WC    insulin lispro  0-4 Units SubCUTAneous Nightly    pantoprazole  40 mg Oral QAM AC    amLODIPine  10 mg Oral Daily    sodium chloride flush  5-40 mL IntraVENous 2 times per day    vancomycin  1,250 mg IntraVENous Q24H    [Held by provider] losartan  50 mg Oral Daily    aspirin  81 mg Oral Daily    atorvastatin  10 mg Oral Daily    levothyroxine  100 mcg Oral Daily    Vitamin D  1,000 Units Oral BID    clopidogrel  75 mg Oral Daily    metoprolol succinate  50 mg Oral Daily    sodium chloride flush  5-40 mL IntraVENous 2 times per day    enoxaparin  30 mg SubCUTAneous BID     Continuous Infusions:    sodium chloride      sodium chloride 15 mL/hr at 03/28/23 160

## 2023-03-31 NOTE — BRIEF OP NOTE
Brief Postoperative Note    Patient: Ericka Finnegan  YOB: 1945  MRN: [id number]      Pre-operative Diagnosis: MRSA, need for IV abx    Post-operative Diagnosis: Same    Procedure: Tunneled central venous catheter placement    Anesthesia: Local    Surgeons/Assistants: Paradise Sanders DO    Estimated Blood Loss: less than 50     Complications: None    Specimens: Was Not Obtained    Findings:   Patent right IJ vein. Successful placement of dual lumen tunneled central venous catheter, ready for immediate use.        Paradise Sanders DO  3/31/2023, 12:33 PM  Interventional Radiology  [phone] ([phone])

## 2023-04-01 LAB
ALBUMIN SERPL-MCNC: 2.6 G/DL (ref 3.4–5)
ALBUMIN/GLOB SERPL: 1.1 {RATIO} (ref 1.1–2.2)
ALP SERPL-CCNC: 162 U/L (ref 40–129)
ALT SERPL-CCNC: 34 U/L (ref 10–40)
ANION GAP SERPL CALCULATED.3IONS-SCNC: 8 MMOL/L (ref 3–16)
AST SERPL-CCNC: 29 U/L (ref 15–37)
BASOPHILS # BLD: 0 K/UL (ref 0–0.2)
BASOPHILS NFR BLD: 0 %
BILIRUB SERPL-MCNC: 0.5 MG/DL (ref 0–1)
BUN SERPL-MCNC: 20 MG/DL (ref 7–20)
CALCIUM SERPL-MCNC: 8.3 MG/DL (ref 8.3–10.6)
CHLORIDE SERPL-SCNC: 104 MMOL/L (ref 99–110)
CO2 SERPL-SCNC: 24 MMOL/L (ref 21–32)
CREAT SERPL-MCNC: 0.9 MG/DL (ref 0.8–1.3)
DEPRECATED RDW RBC AUTO: 14.4 % (ref 12.4–15.4)
EOSINOPHIL # BLD: 0.2 K/UL (ref 0–0.6)
EOSINOPHIL NFR BLD: 2 %
GFR SERPLBLD CREATININE-BSD FMLA CKD-EPI: >60 ML/MIN/{1.73_M2}
GLUCOSE BLD-MCNC: 100 MG/DL (ref 70–99)
GLUCOSE BLD-MCNC: 126 MG/DL (ref 70–99)
GLUCOSE BLD-MCNC: 129 MG/DL (ref 70–99)
GLUCOSE BLD-MCNC: 176 MG/DL (ref 70–99)
GLUCOSE SERPL-MCNC: 105 MG/DL (ref 70–99)
HCT VFR BLD AUTO: 37 % (ref 40.5–52.5)
HGB BLD-MCNC: 12.1 G/DL (ref 13.5–17.5)
LYMPHOCYTES # BLD: 1.8 K/UL (ref 1–5.1)
LYMPHOCYTES NFR BLD: 21 %
MAGNESIUM SERPL-MCNC: 2.2 MG/DL (ref 1.8–2.4)
MCH RBC QN AUTO: 30.5 PG (ref 26–34)
MCHC RBC AUTO-ENTMCNC: 32.8 G/DL (ref 31–36)
MCV RBC AUTO: 93 FL (ref 80–100)
METAMYELOCYTES NFR BLD MANUAL: 1 %
MONOCYTES # BLD: 0.5 K/UL (ref 0–1.3)
MONOCYTES NFR BLD: 6 %
MYELOCYTES NFR BLD MANUAL: 1 %
NEUTROPHILS # BLD: 5.9 K/UL (ref 1.7–7.7)
NEUTROPHILS NFR BLD: 60 %
NEUTS BAND NFR BLD MANUAL: 8 % (ref 0–7)
PERFORMED ON: ABNORMAL
PHOSPHATE SERPL-MCNC: 2.6 MG/DL (ref 2.5–4.9)
PLATELET # BLD AUTO: 194 K/UL (ref 135–450)
PLATELET BLD QL SMEAR: ADEQUATE
PMV BLD AUTO: 8.7 FL (ref 5–10.5)
POLYCHROMASIA BLD QL SMEAR: ABNORMAL
POTASSIUM SERPL-SCNC: 4.7 MMOL/L (ref 3.5–5.1)
PROT SERPL-MCNC: 4.9 G/DL (ref 6.4–8.2)
RBC # BLD AUTO: 3.98 M/UL (ref 4.2–5.9)
SLIDE REVIEW: ABNORMAL
SODIUM SERPL-SCNC: 136 MMOL/L (ref 136–145)
VARIANT LYMPHS NFR BLD MANUAL: 1 % (ref 0–6)
WBC # BLD AUTO: 8.4 K/UL (ref 4–11)

## 2023-04-01 PROCEDURE — 6370000000 HC RX 637 (ALT 250 FOR IP): Performed by: INTERNAL MEDICINE

## 2023-04-01 PROCEDURE — 94761 N-INVAS EAR/PLS OXIMETRY MLT: CPT

## 2023-04-01 PROCEDURE — 6360000002 HC RX W HCPCS: Performed by: INTERNAL MEDICINE

## 2023-04-01 PROCEDURE — 6370000000 HC RX 637 (ALT 250 FOR IP): Performed by: PHYSICIAN ASSISTANT

## 2023-04-01 PROCEDURE — 80053 COMPREHEN METABOLIC PANEL: CPT

## 2023-04-01 PROCEDURE — 97530 THERAPEUTIC ACTIVITIES: CPT

## 2023-04-01 PROCEDURE — 84100 ASSAY OF PHOSPHORUS: CPT

## 2023-04-01 PROCEDURE — 83735 ASSAY OF MAGNESIUM: CPT

## 2023-04-01 PROCEDURE — 1200000000 HC SEMI PRIVATE

## 2023-04-01 PROCEDURE — 85025 COMPLETE CBC W/AUTO DIFF WBC: CPT

## 2023-04-01 PROCEDURE — 2580000003 HC RX 258: Performed by: INTERNAL MEDICINE

## 2023-04-01 PROCEDURE — 97535 SELF CARE MNGMENT TRAINING: CPT

## 2023-04-01 PROCEDURE — 6370000000 HC RX 637 (ALT 250 FOR IP): Performed by: NURSE PRACTITIONER

## 2023-04-01 PROCEDURE — 36415 COLL VENOUS BLD VENIPUNCTURE: CPT

## 2023-04-01 RX ORDER — INSULIN GLARGINE 100 [IU]/ML
20 INJECTION, SOLUTION SUBCUTANEOUS EVERY MORNING
Status: DISCONTINUED | OUTPATIENT
Start: 2023-04-02 | End: 2023-04-03

## 2023-04-01 RX ORDER — POLYETHYLENE GLYCOL 3350 17 G/17G
17 POWDER, FOR SOLUTION ORAL DAILY
Status: DISCONTINUED | OUTPATIENT
Start: 2023-04-01 | End: 2023-04-04 | Stop reason: HOSPADM

## 2023-04-01 RX ORDER — BISACODYL 5 MG/1
10 TABLET, DELAYED RELEASE ORAL ONCE
Status: COMPLETED | OUTPATIENT
Start: 2023-04-01 | End: 2023-04-01

## 2023-04-01 RX ADMIN — PANTOPRAZOLE SODIUM 40 MG: 40 TABLET, DELAYED RELEASE ORAL at 05:44

## 2023-04-01 RX ADMIN — ENOXAPARIN SODIUM 30 MG: 100 INJECTION SUBCUTANEOUS at 21:05

## 2023-04-01 RX ADMIN — Medication 10 ML: at 21:06

## 2023-04-01 RX ADMIN — AMLODIPINE BESYLATE 10 MG: 5 TABLET ORAL at 09:30

## 2023-04-01 RX ADMIN — ENOXAPARIN SODIUM 30 MG: 100 INJECTION SUBCUTANEOUS at 09:29

## 2023-04-01 RX ADMIN — Medication 1000 UNITS: at 09:29

## 2023-04-01 RX ADMIN — INSULIN LISPRO 5 UNITS: 100 INJECTION, SOLUTION INTRAVENOUS; SUBCUTANEOUS at 12:38

## 2023-04-01 RX ADMIN — POLYETHYLENE GLYCOL 3350 17 G: 17 POWDER, FOR SOLUTION ORAL at 11:15

## 2023-04-01 RX ADMIN — Medication 10 ML: at 11:25

## 2023-04-01 RX ADMIN — Medication 10 ML: at 11:16

## 2023-04-01 RX ADMIN — BISACODYL 10 MG: 5 TABLET, COATED ORAL at 09:29

## 2023-04-01 RX ADMIN — LEVOTHYROXINE SODIUM 100 MCG: 100 TABLET ORAL at 05:44

## 2023-04-01 RX ADMIN — METOPROLOL SUCCINATE 50 MG: 50 TABLET, EXTENDED RELEASE ORAL at 09:30

## 2023-04-01 RX ADMIN — Medication 1000 UNITS: at 21:05

## 2023-04-01 RX ADMIN — INSULIN LISPRO 5 UNITS: 100 INJECTION, SOLUTION INTRAVENOUS; SUBCUTANEOUS at 16:50

## 2023-04-01 RX ADMIN — CLOPIDOGREL BISULFATE 75 MG: 75 TABLET ORAL at 09:30

## 2023-04-01 RX ADMIN — ASPIRIN 81 MG: 81 TABLET, COATED ORAL at 09:29

## 2023-04-01 RX ADMIN — VANCOMYCIN HYDROCHLORIDE 1250 MG: 10 INJECTION, POWDER, LYOPHILIZED, FOR SOLUTION INTRAVENOUS at 16:16

## 2023-04-01 RX ADMIN — ANTACID TABLETS 500 MG: 500 TABLET, CHEWABLE ORAL at 05:45

## 2023-04-01 RX ADMIN — ATORVASTATIN CALCIUM 10 MG: 10 TABLET, FILM COATED ORAL at 09:30

## 2023-04-02 LAB
ALBUMIN SERPL-MCNC: 1.7 G/DL (ref 3.4–5)
ALBUMIN/GLOB SERPL: 0.4 {RATIO} (ref 1.1–2.2)
ALP SERPL-CCNC: 151 U/L (ref 40–129)
ALT SERPL-CCNC: 28 U/L (ref 10–40)
ANION GAP SERPL CALCULATED.3IONS-SCNC: 5 MMOL/L (ref 3–16)
AST SERPL-CCNC: 26 U/L (ref 15–37)
BACTERIA BLD CULT ORG #2: NORMAL
BACTERIA BLD CULT: NORMAL
BACTERIA SPEC AEROBE CULT: ABNORMAL
BACTERIA SPEC ANAEROBE CULT: ABNORMAL
BASOPHILS # BLD: 0 K/UL (ref 0–0.2)
BASOPHILS NFR BLD: 0 %
BILIRUB SERPL-MCNC: 0.7 MG/DL (ref 0–1)
BUN SERPL-MCNC: 14 MG/DL (ref 7–20)
CALCIUM SERPL-MCNC: 8.5 MG/DL (ref 8.3–10.6)
CHLORIDE SERPL-SCNC: 102 MMOL/L (ref 99–110)
CO2 SERPL-SCNC: 22 MMOL/L (ref 21–32)
CREAT SERPL-MCNC: 0.8 MG/DL (ref 0.8–1.3)
DEPRECATED RDW RBC AUTO: 14.2 % (ref 12.4–15.4)
EOSINOPHIL # BLD: 0.4 K/UL (ref 0–0.6)
EOSINOPHIL NFR BLD: 4 %
GFR SERPLBLD CREATININE-BSD FMLA CKD-EPI: >60 ML/MIN/{1.73_M2}
GLUCOSE BLD-MCNC: 125 MG/DL (ref 70–99)
GLUCOSE BLD-MCNC: 144 MG/DL (ref 70–99)
GLUCOSE BLD-MCNC: 149 MG/DL (ref 70–99)
GLUCOSE BLD-MCNC: 157 MG/DL (ref 70–99)
GLUCOSE SERPL-MCNC: 152 MG/DL (ref 70–99)
GRAM STN SPEC: ABNORMAL
HCT VFR BLD AUTO: 37.6 % (ref 40.5–52.5)
HGB BLD-MCNC: 12.4 G/DL (ref 13.5–17.5)
LYMPHOCYTES # BLD: 2.9 K/UL (ref 1–5.1)
LYMPHOCYTES NFR BLD: 26 %
MAGNESIUM SERPL-MCNC: 2.3 MG/DL (ref 1.8–2.4)
MCH RBC QN AUTO: 30.4 PG (ref 26–34)
MCHC RBC AUTO-ENTMCNC: 33 G/DL (ref 31–36)
MCV RBC AUTO: 92 FL (ref 80–100)
MONOCYTES # BLD: 0.7 K/UL (ref 0–1.3)
MONOCYTES NFR BLD: 6 %
MYELOCYTES NFR BLD MANUAL: 2 %
NEUTROPHILS # BLD: 7.2 K/UL (ref 1.7–7.7)
NEUTROPHILS NFR BLD: 62 %
ORGANISM: ABNORMAL
PERFORMED ON: ABNORMAL
PHOSPHATE SERPL-MCNC: 2.9 MG/DL (ref 2.5–4.9)
PLATELET # BLD AUTO: 283 K/UL (ref 135–450)
PLATELET BLD QL SMEAR: ADEQUATE
PMV BLD AUTO: 8.8 FL (ref 5–10.5)
POTASSIUM SERPL-SCNC: 4.7 MMOL/L (ref 3.5–5.1)
PROT SERPL-MCNC: 5.5 G/DL (ref 6.4–8.2)
RBC # BLD AUTO: 4.09 M/UL (ref 4.2–5.9)
RBC MORPH BLD: NORMAL
REASON FOR REJECTION: NORMAL
REJECTED TEST: NORMAL
SLIDE REVIEW: ABNORMAL
SODIUM SERPL-SCNC: 129 MMOL/L (ref 136–145)
WBC # BLD AUTO: 11.2 K/UL (ref 4–11)

## 2023-04-02 PROCEDURE — 36556 INSERT NON-TUNNEL CV CATH: CPT

## 2023-04-02 PROCEDURE — 2580000003 HC RX 258: Performed by: INTERNAL MEDICINE

## 2023-04-02 PROCEDURE — 84100 ASSAY OF PHOSPHORUS: CPT

## 2023-04-02 PROCEDURE — 6370000000 HC RX 637 (ALT 250 FOR IP): Performed by: INTERNAL MEDICINE

## 2023-04-02 PROCEDURE — 94760 N-INVAS EAR/PLS OXIMETRY 1: CPT

## 2023-04-02 PROCEDURE — 99231 SBSQ HOSP IP/OBS SF/LOW 25: CPT | Performed by: INTERNAL MEDICINE

## 2023-04-02 PROCEDURE — 80053 COMPREHEN METABOLIC PANEL: CPT

## 2023-04-02 PROCEDURE — 36415 COLL VENOUS BLD VENIPUNCTURE: CPT

## 2023-04-02 PROCEDURE — 6360000002 HC RX W HCPCS: Performed by: INTERNAL MEDICINE

## 2023-04-02 PROCEDURE — 85025 COMPLETE CBC W/AUTO DIFF WBC: CPT

## 2023-04-02 PROCEDURE — 83735 ASSAY OF MAGNESIUM: CPT

## 2023-04-02 PROCEDURE — 6370000000 HC RX 637 (ALT 250 FOR IP): Performed by: NURSE PRACTITIONER

## 2023-04-02 PROCEDURE — 1200000000 HC SEMI PRIVATE

## 2023-04-02 PROCEDURE — 94761 N-INVAS EAR/PLS OXIMETRY MLT: CPT

## 2023-04-02 PROCEDURE — 6370000000 HC RX 637 (ALT 250 FOR IP): Performed by: PHYSICIAN ASSISTANT

## 2023-04-02 RX ADMIN — Medication 10 ML: at 20:49

## 2023-04-02 RX ADMIN — CLOPIDOGREL BISULFATE 75 MG: 75 TABLET ORAL at 08:33

## 2023-04-02 RX ADMIN — INSULIN GLARGINE 20 UNITS: 100 INJECTION, SOLUTION SUBCUTANEOUS at 08:41

## 2023-04-02 RX ADMIN — ENOXAPARIN SODIUM 30 MG: 100 INJECTION SUBCUTANEOUS at 08:34

## 2023-04-02 RX ADMIN — AMLODIPINE BESYLATE 10 MG: 5 TABLET ORAL at 08:33

## 2023-04-02 RX ADMIN — Medication 10 ML: at 08:36

## 2023-04-02 RX ADMIN — ANTACID TABLETS 500 MG: 500 TABLET, CHEWABLE ORAL at 07:03

## 2023-04-02 RX ADMIN — POLYETHYLENE GLYCOL 3350 17 G: 17 POWDER, FOR SOLUTION ORAL at 08:32

## 2023-04-02 RX ADMIN — LEVOTHYROXINE SODIUM 100 MCG: 100 TABLET ORAL at 07:03

## 2023-04-02 RX ADMIN — VANCOMYCIN HYDROCHLORIDE 1250 MG: 10 INJECTION, POWDER, LYOPHILIZED, FOR SOLUTION INTRAVENOUS at 14:54

## 2023-04-02 RX ADMIN — ENOXAPARIN SODIUM 30 MG: 100 INJECTION SUBCUTANEOUS at 20:49

## 2023-04-02 RX ADMIN — ASPIRIN 81 MG: 81 TABLET, COATED ORAL at 08:34

## 2023-04-02 RX ADMIN — Medication 1000 UNITS: at 08:34

## 2023-04-02 RX ADMIN — INSULIN LISPRO 5 UNITS: 100 INJECTION, SOLUTION INTRAVENOUS; SUBCUTANEOUS at 08:41

## 2023-04-02 RX ADMIN — METOPROLOL SUCCINATE 50 MG: 50 TABLET, EXTENDED RELEASE ORAL at 08:34

## 2023-04-02 RX ADMIN — INSULIN LISPRO 5 UNITS: 100 INJECTION, SOLUTION INTRAVENOUS; SUBCUTANEOUS at 16:20

## 2023-04-02 RX ADMIN — PANTOPRAZOLE SODIUM 40 MG: 40 TABLET, DELAYED RELEASE ORAL at 07:03

## 2023-04-02 RX ADMIN — Medication 1000 UNITS: at 20:49

## 2023-04-02 RX ADMIN — INSULIN LISPRO 5 UNITS: 100 INJECTION, SOLUTION INTRAVENOUS; SUBCUTANEOUS at 11:58

## 2023-04-02 RX ADMIN — ATORVASTATIN CALCIUM 10 MG: 10 TABLET, FILM COATED ORAL at 08:34

## 2023-04-03 ENCOUNTER — TELEPHONE (OUTPATIENT)
Dept: ORTHOPEDIC SURGERY | Age: 78
End: 2023-04-03

## 2023-04-03 LAB
ALBUMIN SERPL-MCNC: 2.7 G/DL (ref 3.4–5)
ALBUMIN/GLOB SERPL: 0.9 {RATIO} (ref 1.1–2.2)
ALP SERPL-CCNC: 150 U/L (ref 40–129)
ALT SERPL-CCNC: 29 U/L (ref 10–40)
ANION GAP SERPL CALCULATED.3IONS-SCNC: 6 MMOL/L (ref 3–16)
AST SERPL-CCNC: 29 U/L (ref 15–37)
BASOPHILS # BLD: 0 K/UL (ref 0–0.2)
BASOPHILS NFR BLD: 0 %
BILIRUB SERPL-MCNC: 0.5 MG/DL (ref 0–1)
BUN SERPL-MCNC: 13 MG/DL (ref 7–20)
CALCIUM SERPL-MCNC: 8.5 MG/DL (ref 8.3–10.6)
CHLORIDE SERPL-SCNC: 101 MMOL/L (ref 99–110)
CO2 SERPL-SCNC: 29 MMOL/L (ref 21–32)
CREAT SERPL-MCNC: 0.9 MG/DL (ref 0.8–1.3)
DEPRECATED RDW RBC AUTO: 14.6 % (ref 12.4–15.4)
EOSINOPHIL # BLD: 0.4 K/UL (ref 0–0.6)
EOSINOPHIL NFR BLD: 3 %
GFR SERPLBLD CREATININE-BSD FMLA CKD-EPI: >60 ML/MIN/{1.73_M2}
GLUCOSE BLD-MCNC: 112 MG/DL (ref 70–99)
GLUCOSE BLD-MCNC: 138 MG/DL (ref 70–99)
GLUCOSE BLD-MCNC: 159 MG/DL (ref 70–99)
GLUCOSE BLD-MCNC: 254 MG/DL (ref 70–99)
GLUCOSE SERPL-MCNC: 258 MG/DL (ref 70–99)
HCT VFR BLD AUTO: 36.3 % (ref 40.5–52.5)
HGB BLD-MCNC: 12.1 G/DL (ref 13.5–17.5)
LYMPHOCYTES # BLD: 2 K/UL (ref 1–5.1)
LYMPHOCYTES NFR BLD: 15 %
MCH RBC QN AUTO: 30.6 PG (ref 26–34)
MCHC RBC AUTO-ENTMCNC: 33.3 G/DL (ref 31–36)
MCV RBC AUTO: 92.1 FL (ref 80–100)
MONOCYTES # BLD: 1 K/UL (ref 0–1.3)
MONOCYTES NFR BLD: 8 %
MYELOCYTES NFR BLD MANUAL: 3 %
NEUTROPHILS # BLD: 9.7 K/UL (ref 1.7–7.7)
NEUTROPHILS NFR BLD: 71 %
PERFORMED ON: ABNORMAL
PLATELET # BLD AUTO: 308 K/UL (ref 135–450)
PLATELET BLD QL SMEAR: ADEQUATE
PMV BLD AUTO: 8.7 FL (ref 5–10.5)
POTASSIUM SERPL-SCNC: 4.1 MMOL/L (ref 3.5–5.1)
PROT SERPL-MCNC: 5.7 G/DL (ref 6.4–8.2)
RBC # BLD AUTO: 3.93 M/UL (ref 4.2–5.9)
RBC MORPH BLD: NORMAL
SLIDE REVIEW: ABNORMAL
SODIUM SERPL-SCNC: 136 MMOL/L (ref 136–145)
WBC # BLD AUTO: 13.1 K/UL (ref 4–11)

## 2023-04-03 PROCEDURE — 6370000000 HC RX 637 (ALT 250 FOR IP): Performed by: INTERNAL MEDICINE

## 2023-04-03 PROCEDURE — 6370000000 HC RX 637 (ALT 250 FOR IP): Performed by: PHYSICIAN ASSISTANT

## 2023-04-03 PROCEDURE — 97110 THERAPEUTIC EXERCISES: CPT

## 2023-04-03 PROCEDURE — 6370000000 HC RX 637 (ALT 250 FOR IP): Performed by: NURSE PRACTITIONER

## 2023-04-03 PROCEDURE — 97530 THERAPEUTIC ACTIVITIES: CPT

## 2023-04-03 PROCEDURE — 36592 COLLECT BLOOD FROM PICC: CPT

## 2023-04-03 PROCEDURE — 99024 POSTOP FOLLOW-UP VISIT: CPT | Performed by: NURSE PRACTITIONER

## 2023-04-03 PROCEDURE — 2580000003 HC RX 258: Performed by: INTERNAL MEDICINE

## 2023-04-03 PROCEDURE — 1200000000 HC SEMI PRIVATE

## 2023-04-03 PROCEDURE — 6360000002 HC RX W HCPCS: Performed by: INTERNAL MEDICINE

## 2023-04-03 PROCEDURE — 99232 SBSQ HOSP IP/OBS MODERATE 35: CPT | Performed by: INTERNAL MEDICINE

## 2023-04-03 PROCEDURE — APPNB45 APP NON BILLABLE 31-45 MINUTES: Performed by: NURSE PRACTITIONER

## 2023-04-03 PROCEDURE — 80053 COMPREHEN METABOLIC PANEL: CPT

## 2023-04-03 PROCEDURE — 85025 COMPLETE CBC W/AUTO DIFF WBC: CPT

## 2023-04-03 RX ORDER — LACTOBACILLUS RHAMNOSUS GG 10B CELL
1 CAPSULE ORAL
Status: DISCONTINUED | OUTPATIENT
Start: 2023-04-04 | End: 2023-04-04 | Stop reason: HOSPADM

## 2023-04-03 RX ORDER — INSULIN GLARGINE 100 [IU]/ML
25 INJECTION, SOLUTION SUBCUTANEOUS EVERY MORNING
Status: DISCONTINUED | OUTPATIENT
Start: 2023-04-04 | End: 2023-04-04 | Stop reason: HOSPADM

## 2023-04-03 RX ORDER — INSULIN LISPRO 100 [IU]/ML
7 INJECTION, SOLUTION INTRAVENOUS; SUBCUTANEOUS
Status: DISCONTINUED | OUTPATIENT
Start: 2023-04-03 | End: 2023-04-04 | Stop reason: HOSPADM

## 2023-04-03 RX ADMIN — AMLODIPINE BESYLATE 10 MG: 5 TABLET ORAL at 09:56

## 2023-04-03 RX ADMIN — INSULIN LISPRO 7 UNITS: 100 INJECTION, SOLUTION INTRAVENOUS; SUBCUTANEOUS at 12:29

## 2023-04-03 RX ADMIN — Medication 10 ML: at 12:27

## 2023-04-03 RX ADMIN — INSULIN LISPRO 7 UNITS: 100 INJECTION, SOLUTION INTRAVENOUS; SUBCUTANEOUS at 16:34

## 2023-04-03 RX ADMIN — ENOXAPARIN SODIUM 30 MG: 100 INJECTION SUBCUTANEOUS at 20:46

## 2023-04-03 RX ADMIN — Medication 10 ML: at 20:46

## 2023-04-03 RX ADMIN — POLYETHYLENE GLYCOL 3350 17 G: 17 POWDER, FOR SOLUTION ORAL at 09:57

## 2023-04-03 RX ADMIN — CLOPIDOGREL BISULFATE 75 MG: 75 TABLET ORAL at 09:56

## 2023-04-03 RX ADMIN — INSULIN LISPRO 4 UNITS: 100 INJECTION, SOLUTION INTRAVENOUS; SUBCUTANEOUS at 12:29

## 2023-04-03 RX ADMIN — ATORVASTATIN CALCIUM 10 MG: 10 TABLET, FILM COATED ORAL at 09:56

## 2023-04-03 RX ADMIN — Medication 1000 UNITS: at 09:56

## 2023-04-03 RX ADMIN — ENOXAPARIN SODIUM 30 MG: 100 INJECTION SUBCUTANEOUS at 09:56

## 2023-04-03 RX ADMIN — LEVOTHYROXINE SODIUM 100 MCG: 100 TABLET ORAL at 06:48

## 2023-04-03 RX ADMIN — Medication 1000 UNITS: at 20:46

## 2023-04-03 RX ADMIN — PANTOPRAZOLE SODIUM 40 MG: 40 TABLET, DELAYED RELEASE ORAL at 06:48

## 2023-04-03 RX ADMIN — VANCOMYCIN HYDROCHLORIDE 1250 MG: 10 INJECTION, POWDER, LYOPHILIZED, FOR SOLUTION INTRAVENOUS at 16:29

## 2023-04-03 RX ADMIN — Medication 10 ML: at 12:28

## 2023-04-03 RX ADMIN — METOPROLOL SUCCINATE 50 MG: 50 TABLET, EXTENDED RELEASE ORAL at 09:56

## 2023-04-03 RX ADMIN — INSULIN LISPRO 5 UNITS: 100 INJECTION, SOLUTION INTRAVENOUS; SUBCUTANEOUS at 09:57

## 2023-04-03 RX ADMIN — ASPIRIN 81 MG: 81 TABLET, COATED ORAL at 09:56

## 2023-04-03 RX ADMIN — ANTACID TABLETS 500 MG: 500 TABLET, CHEWABLE ORAL at 06:49

## 2023-04-03 RX ADMIN — INSULIN GLARGINE 20 UNITS: 100 INJECTION, SOLUTION SUBCUTANEOUS at 09:58

## 2023-04-03 ASSESSMENT — PAIN SCALES - GENERAL: PAINLEVEL_OUTOF10: 0

## 2023-04-03 NOTE — TELEPHONE ENCOUNTER
General Question     Subject: patient just call and would like to know when will he be able to remove his boot. He just need a call back regarding this matter. Please Advise.   Patient Mary Alice Saul  Contact Number: [phone]

## 2023-04-03 NOTE — TELEPHONE ENCOUNTER
Spoke with the Pt's wufe (Renuka Phillips) letting her know that the pt should keep the boot on for 2 weeks when he comes to see Dr. Shaggy Salas. While resting or in bed the boot can come off.  When he is up and moving around the boot should be on    They agreed to be seen in the FF office on 4/17/23 at 2:30pm       Instructions were understood and all questions were answered

## 2023-04-04 ENCOUNTER — TELEPHONE (OUTPATIENT)
Dept: INFECTIOUS DISEASES | Age: 78
End: 2023-04-04

## 2023-04-04 VITALS
BODY MASS INDEX: 47.93 KG/M2 | WEIGHT: 287.7 LBS | RESPIRATION RATE: 18 BRPM | HEIGHT: 65 IN | SYSTOLIC BLOOD PRESSURE: 123 MMHG | TEMPERATURE: 98.9 F | OXYGEN SATURATION: 94 % | HEART RATE: 75 BPM | DIASTOLIC BLOOD PRESSURE: 56 MMHG

## 2023-04-04 DIAGNOSIS — Z96.659 CHRONIC INFECTION OF KNEE JOINT PROSTHESIS, SEQUELA: Primary | ICD-10-CM

## 2023-04-04 DIAGNOSIS — T84.59XS CHRONIC INFECTION OF KNEE JOINT PROSTHESIS, SEQUELA: Primary | ICD-10-CM

## 2023-04-04 LAB
ALBUMIN SERPL-MCNC: 2.5 G/DL (ref 3.4–5)
ALBUMIN/GLOB SERPL: 0.9 {RATIO} (ref 1.1–2.2)
ALP SERPL-CCNC: 142 U/L (ref 40–129)
ALT SERPL-CCNC: 32 U/L (ref 10–40)
ANION GAP SERPL CALCULATED.3IONS-SCNC: 4 MMOL/L (ref 3–16)
AST SERPL-CCNC: 34 U/L (ref 15–37)
BASOPHILS # BLD: 0 K/UL (ref 0–0.2)
BASOPHILS NFR BLD: 0 %
BILIRUB SERPL-MCNC: 0.5 MG/DL (ref 0–1)
BUN SERPL-MCNC: 12 MG/DL (ref 7–20)
CALCIUM SERPL-MCNC: 8.8 MG/DL (ref 8.3–10.6)
CHLORIDE SERPL-SCNC: 104 MMOL/L (ref 99–110)
CO2 SERPL-SCNC: 31 MMOL/L (ref 21–32)
CREAT SERPL-MCNC: 1 MG/DL (ref 0.8–1.3)
DEPRECATED RDW RBC AUTO: 14.7 % (ref 12.4–15.4)
EOSINOPHIL # BLD: 0.5 K/UL (ref 0–0.6)
EOSINOPHIL NFR BLD: 4 %
GFR SERPLBLD CREATININE-BSD FMLA CKD-EPI: >60 ML/MIN/{1.73_M2}
GLUCOSE BLD-MCNC: 133 MG/DL (ref 70–99)
GLUCOSE BLD-MCNC: 189 MG/DL (ref 70–99)
GLUCOSE SERPL-MCNC: 127 MG/DL (ref 70–99)
HCT VFR BLD AUTO: 35.4 % (ref 40.5–52.5)
HGB BLD-MCNC: 11.6 G/DL (ref 13.5–17.5)
LYMPHOCYTES # BLD: 2.7 K/UL (ref 1–5.1)
LYMPHOCYTES NFR BLD: 20 %
MAGNESIUM SERPL-MCNC: 2 MG/DL (ref 1.8–2.4)
MCH RBC QN AUTO: 30.3 PG (ref 26–34)
MCHC RBC AUTO-ENTMCNC: 32.8 G/DL (ref 31–36)
MCV RBC AUTO: 92.3 FL (ref 80–100)
MONOCYTES # BLD: 0.5 K/UL (ref 0–1.3)
MONOCYTES NFR BLD: 4 %
MYELOCYTES NFR BLD MANUAL: 3 %
NEUTROPHILS # BLD: 8.7 K/UL (ref 1.7–7.7)
NEUTROPHILS NFR BLD: 59 %
NEUTS BAND NFR BLD MANUAL: 8 % (ref 0–7)
PERFORMED ON: ABNORMAL
PERFORMED ON: ABNORMAL
PHOSPHATE SERPL-MCNC: 2.8 MG/DL (ref 2.5–4.9)
PLATELET # BLD AUTO: 289 K/UL (ref 135–450)
PLATELET BLD QL SMEAR: ADEQUATE
PMV BLD AUTO: 8.6 FL (ref 5–10.5)
POTASSIUM SERPL-SCNC: 4 MMOL/L (ref 3.5–5.1)
PROT SERPL-MCNC: 5.4 G/DL (ref 6.4–8.2)
RBC # BLD AUTO: 3.83 M/UL (ref 4.2–5.9)
RBC MORPH BLD: NORMAL
SLIDE REVIEW: ABNORMAL
SODIUM SERPL-SCNC: 139 MMOL/L (ref 136–145)
VARIANT LYMPHS NFR BLD MANUAL: 2 % (ref 0–6)
WBC # BLD AUTO: 12.4 K/UL (ref 4–11)

## 2023-04-04 PROCEDURE — 84100 ASSAY OF PHOSPHORUS: CPT

## 2023-04-04 PROCEDURE — 97530 THERAPEUTIC ACTIVITIES: CPT

## 2023-04-04 PROCEDURE — 85025 COMPLETE CBC W/AUTO DIFF WBC: CPT

## 2023-04-04 PROCEDURE — 97535 SELF CARE MNGMENT TRAINING: CPT

## 2023-04-04 PROCEDURE — 36592 COLLECT BLOOD FROM PICC: CPT

## 2023-04-04 PROCEDURE — 6370000000 HC RX 637 (ALT 250 FOR IP): Performed by: INTERNAL MEDICINE

## 2023-04-04 PROCEDURE — 2580000003 HC RX 258: Performed by: INTERNAL MEDICINE

## 2023-04-04 PROCEDURE — 6370000000 HC RX 637 (ALT 250 FOR IP): Performed by: PHYSICIAN ASSISTANT

## 2023-04-04 PROCEDURE — 83735 ASSAY OF MAGNESIUM: CPT

## 2023-04-04 PROCEDURE — 80053 COMPREHEN METABOLIC PANEL: CPT

## 2023-04-04 PROCEDURE — 6360000002 HC RX W HCPCS: Performed by: INTERNAL MEDICINE

## 2023-04-04 RX ADMIN — PANTOPRAZOLE SODIUM 40 MG: 40 TABLET, DELAYED RELEASE ORAL at 05:45

## 2023-04-04 RX ADMIN — ACETAMINOPHEN 650 MG: 325 TABLET ORAL at 08:48

## 2023-04-04 RX ADMIN — ENOXAPARIN SODIUM 30 MG: 100 INJECTION SUBCUTANEOUS at 08:48

## 2023-04-04 RX ADMIN — ATORVASTATIN CALCIUM 10 MG: 10 TABLET, FILM COATED ORAL at 08:48

## 2023-04-04 RX ADMIN — INSULIN LISPRO 7 UNITS: 100 INJECTION, SOLUTION INTRAVENOUS; SUBCUTANEOUS at 08:50

## 2023-04-04 RX ADMIN — POLYETHYLENE GLYCOL 3350 17 G: 17 POWDER, FOR SOLUTION ORAL at 08:49

## 2023-04-04 RX ADMIN — CLOPIDOGREL BISULFATE 75 MG: 75 TABLET ORAL at 08:48

## 2023-04-04 RX ADMIN — INSULIN LISPRO 7 UNITS: 100 INJECTION, SOLUTION INTRAVENOUS; SUBCUTANEOUS at 11:54

## 2023-04-04 RX ADMIN — LOSARTAN POTASSIUM 50 MG: 25 TABLET, FILM COATED ORAL at 08:49

## 2023-04-04 RX ADMIN — Medication 10 ML: at 09:00

## 2023-04-04 RX ADMIN — Medication 1 CAPSULE: at 08:48

## 2023-04-04 RX ADMIN — METOPROLOL SUCCINATE 50 MG: 50 TABLET, EXTENDED RELEASE ORAL at 08:49

## 2023-04-04 RX ADMIN — Medication 1000 UNITS: at 08:49

## 2023-04-04 RX ADMIN — LEVOTHYROXINE SODIUM 100 MCG: 100 TABLET ORAL at 05:45

## 2023-04-04 RX ADMIN — INSULIN GLARGINE 25 UNITS: 100 INJECTION, SOLUTION SUBCUTANEOUS at 08:50

## 2023-04-04 RX ADMIN — ASPIRIN 81 MG: 81 TABLET, COATED ORAL at 08:47

## 2023-04-04 RX ADMIN — Medication 10 ML: at 08:50

## 2023-04-04 ASSESSMENT — PAIN - FUNCTIONAL ASSESSMENT
PAIN_FUNCTIONAL_ASSESSMENT: PREVENTS OR INTERFERES SOME ACTIVE ACTIVITIES AND ADLS
PAIN_FUNCTIONAL_ASSESSMENT: ACTIVITIES ARE NOT PREVENTED
PAIN_FUNCTIONAL_ASSESSMENT: PREVENTS OR INTERFERES SOME ACTIVE ACTIVITIES AND ADLS

## 2023-04-04 ASSESSMENT — PAIN DESCRIPTION - ORIENTATION
ORIENTATION: RIGHT

## 2023-04-04 ASSESSMENT — PAIN SCALES - GENERAL
PAINLEVEL_OUTOF10: 0

## 2023-04-04 ASSESSMENT — PAIN DESCRIPTION - LOCATION
LOCATION: LEG

## 2023-04-04 NOTE — PROGRESS NOTES
note    Recent right knee antibiotic spacer insertion 03/27  Sellers currently in place, plan was to remove today, patient requesting the sellers stay in place during rehabilitation d/t immobility  Denies symptoms of BPH at baseline.    Bladder scans will be unreliable with body habitus     Recc  Continue sellers   VT while in rehab  Outpatient follow up requested if having issues voiding after VT  Can discharge when medically clear    Jayashree Hagan CNP  03/29/2023
0354: Shift assessment completed, morning medications given per MAR. VSS, alert and oriented. Call light within reach. The care plan and education has been reviewed and mutually agreed upon with the patient.
0825: Shift assessment completed, morning medications given per MAR. VSS, alert and oriented. Call light within reach. The care plan and education has been reviewed and mutually agreed upon with the patient.
[facility] PROGRESS NOTE    3/31/2023 1:07 PM        Name: Emerita Nascimento . Admitted: 3/25/2023  Primary Care Provider: Chhaya Davenport MD (Tel: [phone])      Subjective:    Patient seen and examined. 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 Right knee pain well controlled. No fever or chills. Still with episodes of Hypoglycemic +.       Reviewed interval ancillary notes    Current Medications  potassium phosphate 30 mmol in sodium chloride 0.9 % 500 mL IVPB, Once  [START ON 4/1/2023] insulin glargine (LANTUS) injection vial 30 Units, QAM  insulin lispro (HUMALOG) injection vial 5 Units, TID WC  insulin lispro (HUMALOG) injection vial 0-8 Units, TID WC  insulin lispro (HUMALOG) injection vial 0-4 Units, Nightly  pantoprazole (PROTONIX) tablet 40 mg, QAM AC  calcium carbonate (TUMS) chewable tablet 500 mg, TID PRN  cloNIDine (CATAPRES) tablet 0.1 mg, Q4H PRN  amLODIPine (NORVASC) tablet 10 mg, Daily  sodium chloride flush 0.9 % injection 5-40 mL, 2 times per day  sodium chloride flush 0.9 % injection 5-40 mL, PRN  0.9 % sodium chloride infusion, PRN  vancomycin (VANCOCIN) 1,250 mg in sodium chloride 0.9 % 250 mL IVPB, Q24H  [Held by provider] losartan (COZAAR) tablet 50 mg, Daily  aspirin EC tablet 81 mg, Daily  atorvastatin (LIPITOR) tablet 10 mg, Daily  levothyroxine (SYNTHROID) tablet 100 mcg, Daily  Vitamin D (CHOLECALCIFEROL) tablet 1,000 Units, BID  clopidogrel (PLAVIX) tablet 75 mg, Daily  metoprolol succinate (TOPROL XL) extended release tablet 50 mg, Daily  sodium chloride flush 0.9 % injection 5-40 mL, 2 times per day  sodium chloride flush 0.9 % injection 5-40 mL, PRN  0.9 % sodium chloride infusion, PRN  ondansetron (ZOFRAN-ODT) disintegrating tablet 4 mg, Q8H PRN   Or  ondansetron (ZOFRAN) injection 4 mg, Q6H
Assessments complete, and meds given per order. No complaints at this time. Call light and belongings in reach. Plan of care discussed with patient and/or family. Will continue to monitor.
Chart reviewed post tunneled central line placement. Line working without problems. Call IR with questions.
Clinical Pharmacy Note: Pharmacy to Dose Vancomycin    Jesusita Lutz is a 76 y.o. male started on Vancomycin for Bone and joint infection; consult received from Dr. Shavonne Elise to manage therapy. Also receiving the following antibiotics: Cefepime. Goal AUC: 400-600 mg/L*hr  Goal Trough Level: 15-20 mcg/mL    Assessment/Plan:  A 1000 mg loading dose was given on 03/25 at 1500 outside the hospital  Initiate vancomycin 1250 mg IV every 24 hours. Bayesian modeling predicts an AUC of 513 mg/L*hr and a trough of 13.7 mcg/mL at steady state concentration. A vancomycin random level has been ordered for 03/26 at 0600  Changes in regimen will be determined based on culture results, renal function, and clinical response. Pharmacy will continue to monitor and adjust regimen as necessary. Allergies:  Adhesive tape     Recent Labs     03/25/23  2324   CREATININE 1.2       Recent Labs     03/25/23  2324   WBC 13.4*       Ht Readings from Last 1 Encounters:   03/25/23 5' 5\" (1.651 m)        Wt Readings from Last 1 Encounters:   03/25/23 287 lb 11.2 oz (130.5 kg)         Estimated Creatinine Clearance: 63 mL/min (based on SCr of 1.2 mg/dL).       Thank you for the consult,    Sarah Rivero, PharmD
Clinical Pharmacy Note: Pharmacy to Dose Vancomycin    Vancomycin Day: 1  Indication: bone and joint infection  Current Dose: 1250 mg every 24 hours  Dosing Method: Bayesian Modeling    Random: 5.0 mg/L    Recent Labs     03/25/23 2324 03/26/23  0550   BUN 20 20       Recent Labs     03/25/23 2324 03/26/23  0550   CREATININE 1.2 1.2       Recent Labs     03/25/23 2324 03/26/23  0550   WBC 13.4* 14.0*         Intake/Output Summary (Last 24 hours) at 3/26/2023 0813  Last data filed at 3/26/2023 0449  Gross per 24 hour   Intake 49.64 ml   Output --   Net 49.64 ml         Ht Readings from Last 1 Encounters:   03/25/23 5' 5\" (1.651 m)        Wt Readings from Last 1 Encounters:   03/25/23 287 lb 11.2 oz (130.5 kg)         Body mass index is 47.88 kg/m². Estimated Creatinine Clearance: 63 mL/min (based on SCr of 1.2 mg/dL). Assessment/Plan:  Vancomycin level is predicted to be therapeutic at steady state. Continue current vancomycin regimen of 1250 mg every 24 hours. Bayesian Modeling predicts an AUC of 445 mg/L*hr and trough of 15.1 mg/L. A vancomycin random level has been ordered on 3/27 at 0600 for follow-up. Changes in regimen will be determined based on culture results, renal function, and clinical response. Pharmacy will continue to monitor and adjust regimen as necessary. Thank you for the consult,    Heydi Cook, PharmD.   PGY-1 Resident  [id number]  03/26/23 8:15 AM
Clinical Pharmacy Note: Pharmacy to Dose Vancomycin    Vancomycin Day: 2  Indication: bone and joint infection  Also on Cefepime Day 3. Current Dose: 1250 mg every 24 hours  Dosing Method: Bayesian Modeling    Random: 11.1 mg/L    Recent Labs     03/25/23  2324 03/26/23  0550   BUN 20 20       Recent Labs     03/25/23  2324 03/26/23  0550   CREATININE 1.2 1.2       Recent Labs     03/26/23  0550 03/27/23  0432   WBC 14.0* 11.9*         Intake/Output Summary (Last 24 hours) at 3/27/2023 0720  Last data filed at 3/27/2023 0535  Gross per 24 hour   Intake --   Output 800 ml   Net -800 ml         Ht Readings from Last 1 Encounters:   03/25/23 5' 5\" (1.651 m)        Wt Readings from Last 1 Encounters:   03/25/23 287 lb 11.2 oz (130.5 kg)         Body mass index is 47.88 kg/m². Estimated Creatinine Clearance: 63 mL/min (based on SCr of 1.2 mg/dL). Assessment/Plan:  Vancomycin level is therapeutic. Continue current vancomycin regimen of 1250 every 24 hours. Bayesian Modeling predicts an AUC of 462 mg/L*hr and trough of 14.7 mg/L. A vancomycin random level will be ordered as needed   ID to follow  Changes in regimen will be determined based on culture results, renal function, and clinical response. Pharmacy will continue to monitor and adjust regimen as necessary.     Thank you for the consult,    Hailey Verdugo   PharmD Student
Clinical Pharmacy Note: Pharmacy to Dose Vancomycin    Vancomycin Day: 4  Indication: Prosthetic knee infection, Bone and Joint Infection  Cultures (+) MRSA   ID Following  Current Dose: 1250mg q 24 h   Dosing Method: Bayesian Modeling    Random: 11.1 mg/L     Recent Labs     03/27/23  0431 03/28/23  0445   BUN 28* 32*       Recent Labs     03/27/23  0431 03/28/23  0445   CREATININE 1.4* 1.1       Recent Labs     03/27/23  0432 03/28/23  0445   WBC 11.9* 11.1*         Intake/Output Summary (Last 24 hours) at 3/29/2023 0717  Last data filed at 3/29/2023 0543  Gross per 24 hour   Intake 3487.81 ml   Output 1050 ml   Net 2437.81 ml         Ht Readings from Last 1 Encounters:   03/25/23 5' 5\" (1.651 m)        Wt Readings from Last 1 Encounters:   03/25/23 287 lb 11.2 oz (130.5 kg)         Body mass index is 47.88 kg/m². Estimated Creatinine Clearance: 69 mL/min (based on SCr of 1.1 mg/dL). Assessment/Plan:  Vancomycin level is therapeutic. Continue current vancomycin regimen of 1250mg every 24 hours. Bayesian Modeling predicts an AUC of 566 mg/L*hr and trough of 17.3 mg/L. A vancomycin random level will be ordered as needed  Changes in regimen will be determined based on culture results, renal function, and clinical response. Pharmacy will continue to monitor and adjust regimen as necessary.     Thank you for the consult,    Jose Hsieh   PharmD Student
Dr Alvin Peterson aware PVC regularly Q 4-5 beats, rest are NSR, IV wide open X 1 liter NS started
ID Follow-up NOTE    CC:   R knee PJI, MRSA bacteremia  Antibiotics: Vancomycin    Admit Date: 3/25/2023  Hospital Day: 10    Subjective:     POD#6 R knee resection arthroplasty  Reports R leg / surg site pain, increase with wt bearing / activity    Objective:     Patient Vitals for the past 8 hrs:   BP Temp Temp src Pulse Resp SpO2 Height   04/03/23 1237 -- -- -- -- -- -- 5' 5\" (1.651 m)   04/03/23 1208 122/69 98.6 °F (37 °C) Oral 70 18 93 % --   04/03/23 0945 125/64 98.6 °F (37 °C) Oral 72 18 94 % --       I/O last 3 completed shifts: In: 920 [P.O.:920]  Out: 2250 [Urine:2250]  No intake/output data recorded. EXAM:  GENERAL: No apparent distress.     HEENT: Membranes moist, no oral lesion  NECK:  Supple, no lymphadenopathy  LUNGS: Clear b/l, no rales, no dullness  CARDIAC: RRR, no murmur appreciated  ABD:  + BS, soft / NT  EXT:  R knee with dressing   NEURO: No focal neurologic findings  PSYCH: Orientation, sensorium, mood normal  LINES:  R PICC in place       Data Review:  Lab Results   Component Value Date    WBC 13.1 (H) 04/03/2023    HGB 12.1 (L) 04/03/2023    HCT 36.3 (L) 04/03/2023    MCV 92.1 04/03/2023     04/03/2023     Lab Results   Component Value Date    CREATININE 0.9 04/03/2023    BUN 13 04/03/2023     04/03/2023    K 4.1 04/03/2023     04/03/2023    CO2 29 04/03/2023       Hepatic Function Panel:   Lab Results   Component Value Date/Time    ALKPHOS 150 04/03/2023 10:00 AM    ALT 29 04/03/2023 10:00 AM    AST 29 04/03/2023 10:00 AM    PROT 5.7 04/03/2023 10:00 AM    BILITOT 0.5 04/03/2023 10:00 AM    BILIDIR 1.7 03/26/2023 05:50 AM    IBILI 1.1 03/26/2023 05:50 AM    LABALBU 2.7 04/03/2023 10:00 AM       Micro:  3/25 BC x 2 MRSA  Antibiotic Interpretation Microscan    ceFAZolin Resistant 16 mcg/mL   clindamycin Sensitive <=0.5 mcg/mL   DAPTOmycin Sensitive <=0.5 mcg/mL   erythromycin Resistant >4 mcg/mL   linezolid Sensitive 2 mcg/mL   oxacillin Resistant >2 mcg/mL
ID Follow-up NOTE    CC:   R knee PJI, MRSA bacteremia  Antibiotics: Vancomycin    Admit Date: 3/25/2023  Hospital Day: 7    Subjective:     POD#3 R knee resection arthroplasty  Reports R leg / surg site pain, increase with wt bearing / activity    Objective:     Patient Vitals for the past 8 hrs:   BP Temp Temp src Pulse Resp   03/31/23 1400 131/74 97.6 °F (36.4 °C) Oral 68 18   03/31/23 1224 -- -- -- 71 19   03/31/23 1215 -- -- -- 65 20   03/31/23 1209 -- -- -- 67 --       I/O last 3 completed shifts: In: 500.3 [I.V.:0.3; IV Piggyback:500.1]  Out: 2600 [Urine:2600]  I/O this shift: In: 827.6 [I.V.:52.3; IV Piggyback:775.3]  Out: 600 [Urine:600]    EXAM:  GENERAL: No apparent distress.     HEENT: Membranes moist, no oral lesion  NECK:  Supple, no lymphadenopathy  LUNGS: Clear b/l, no rales, no dullness  CARDIAC: RRR, no murmur appreciated  ABD:  + BS, soft / NT  EXT:  R knee with dressing   NEURO: No focal neurologic findings  PSYCH: Orientation, sensorium, mood normal  LINES:  R PICC in place       Data Review:  Lab Results   Component Value Date    WBC 7.9 03/31/2023    HGB 12.2 (L) 03/31/2023    HCT 37.2 (L) 03/31/2023    MCV 94.1 03/31/2023     03/31/2023     Lab Results   Component Value Date    CREATININE 0.9 03/31/2023    BUN 24 (H) 03/31/2023     03/31/2023    K 4.3 03/31/2023     03/31/2023    CO2 28 03/31/2023       Hepatic Function Panel:   Lab Results   Component Value Date/Time    ALKPHOS 150 03/31/2023 08:36 AM    ALT 36 03/31/2023 08:36 AM    AST 30 03/31/2023 08:36 AM    PROT 5.5 03/31/2023 08:36 AM    BILITOT 0.5 03/31/2023 08:36 AM    BILIDIR 1.7 03/26/2023 05:50 AM    IBILI 1.1 03/26/2023 05:50 AM    LABALBU 2.4 03/31/2023 08:36 AM       Micro:  3/25 BC x 2 MRSA  Antibiotic Interpretation Microscan    ceFAZolin Resistant 16 mcg/mL   clindamycin Sensitive <=0.5 mcg/mL   DAPTOmycin Sensitive <=0.5 mcg/mL   erythromycin Resistant >4 mcg/mL   linezolid Sensitive 2 mcg/mL
ID Follow-up NOTE    CC:   R knee PJI, MRSA bacteremia  Antibiotics: Vancomycin    Admit Date: 3/25/2023  Hospital Day: 9    Subjective:     POD#5 R knee resection arthroplasty  Reports R leg / surg site pain, increase with wt bearing / activity    Objective:     Patient Vitals for the past 8 hrs:   BP Temp Temp src Pulse Resp SpO2   04/02/23 0815 137/72 98.2 °F (36.8 °C) Oral 73 16 92 %   04/02/23 0310 (!) 152/71 97.8 °F (36.6 °C) Oral 67 16 95 %       I/O last 3 completed shifts: In: 737.3 [P.O.:240; IV Piggyback:497.3]  Out: 5877 [Urine:2850]  I/O this shift:  In: 120 [P.O.:120]  Out: -     EXAM:  GENERAL: No apparent distress.     HEENT: Membranes moist, no oral lesion  NECK:  Supple, no lymphadenopathy  LUNGS: Clear b/l, no rales, no dullness  CARDIAC: RRR, no murmur appreciated  ABD:  + BS, soft / NT  EXT:  R knee with dressing   NEURO: No focal neurologic findings  PSYCH: Orientation, sensorium, mood normal  LINES:  R PICC in place       Data Review:  Lab Results   Component Value Date    WBC 11.2 (H) 04/02/2023    HGB 12.4 (L) 04/02/2023    HCT 37.6 (L) 04/02/2023    MCV 92.0 04/02/2023     04/02/2023     Lab Results   Component Value Date    CREATININE 0.8 04/02/2023    BUN 14 04/02/2023     (L) 04/02/2023    K 4.7 04/02/2023     04/02/2023    CO2 22 04/02/2023       Hepatic Function Panel:   Lab Results   Component Value Date/Time    ALKPHOS 151 04/02/2023 06:13 AM    ALT 28 04/02/2023 06:13 AM    AST 26 04/02/2023 06:13 AM    PROT 5.5 04/02/2023 06:13 AM    BILITOT 0.7 04/02/2023 06:13 AM    BILIDIR 1.7 03/26/2023 05:50 AM    IBILI 1.1 03/26/2023 05:50 AM    LABALBU 1.7 04/02/2023 06:13 AM       Micro:  3/25 BC x 2 MRSA  Antibiotic Interpretation Microscan    ceFAZolin Resistant 16 mcg/mL   clindamycin Sensitive <=0.5 mcg/mL   DAPTOmycin Sensitive <=0.5 mcg/mL   erythromycin Resistant >4 mcg/mL   linezolid Sensitive 2 mcg/mL   oxacillin Resistant >2 mcg/mL   tetracycline Sensitive <=4
Lab called with abn blood culture results. Dr. Jeanette Miranda made aware. PICC team also called and they need an order that its ok to place PICC without nephrology consult. Will let day shift know.
Nutrition Note    RECOMMENDATIONS  No new nutrition rec's @ this time. NUTRITION ASSESSMENT   Pt receives a [facility] diet & reports is eating well & has a good appetite. No significant wt changes to report. Pt is at low risk for nutrition compromise. Nutrition Related Findings: LBM 3/30; trace edema LLE & RUE, +2 pitting edema RLE. Gluc 159  Wounds: Surgical Incision  Nutrition Education:  Education not indicated   Nutrition Goals: PO intake 75% or greater     MALNUTRITION ASSESSMENT      Malnutrition Status: No malnutrition    NUTRITION DIAGNOSIS   No nutrition diagnosis at this time     CURRENT NUTRITION THERAPIES  ADULT DIET; Regular; 4 carb choices (60 gm/meal)     PO Intake: 51-75%   PO Supplement Intake:None Ordered    ANTHROPOMETRICS  Current Height: 5' 5\" (165.1 cm)  Current Weight: 287 lb 11.2 oz (130.5 kg)    Ideal Body Weight (IBW): 136 lbs  (62 kg)        BMI: 47.8    The patient will be monitored per nutrition standards of care. Consult dietitian if additional nutrition interventions are needed prior to RD reassessment.      Antoinette Driver RD, LD    Contact: [phone]
Orthopedic surgery consult and ID consult were contacted via [facility].
PICC team called and they are aware of new order.
[facility] [place] [facility] attempted to call report multiple times with no answer. Pt in no distress at discharge.
Patient resting in bed, Willard Fall Risk: High (45 and higher), Pain Level: 0, vitals stable, assisted to position of comfort, call light and belongings in reach, encouraged to call with needs, will continue to monitor.   Vitals:    04/01/23 2100   BP: (!) 164/65   Pulse: 66   Resp: 16   Temp: 98.2 °F (36.8 °C)   SpO2: 95%
Patient resting in bed, Willard Fall Risk: High (45 and higher), Pain Level: 0, vitals stable, assisted to position of comfort, call light and belongings in reach, encouraged to call with needs, will continue to monitor.   Vitals:    04/03/23 0024   BP: 113/75   Pulse: 60   Resp: 18   Temp: 98 °F (36.7 °C)   SpO2: 96%
Physical Therapy/Occupational Therapy  Case Aparicio  Pt bailee for picc line procedure. Will try back at later time when pt able to participate.  Jkae Ly [id number]
Physical Therapy/Occupational Therapy  Ganesh Cole  Pt bailee for picc line procedure. Will try back at later time when pt able to participate.  Naomi Osei [id number]  AUNDREA Escoto/[id number]
Physical/Occupational Therapy  Katya Kern    Orders received for PT/OT evaluation. Patient pending surgery due to infected R knee prosthesis. Will hold therapy at this time and will follow up with pt tomorrow as medically appropriate.  Thanks, Bianca John, PT, DPT [id number], Danilo Martinez M.Ed., OTR/L [id number]
Physician Progress Note      PATIENT:               Lacie Cohn  CSN #:                  [id number]  :                       1945  ADMIT DATE:       3/25/2023 9:54 PM  [address] DATE:  Arlene Guerrero  PROVIDER #:        Nichelle Duran MD          QUERY TEXT:    Pt admitted with Infection of prosthetic right knee joint. Pt noted to have   WBC 13.4  HR 98, + bc, elevated procal/sr/crp . If possible, please document   in the progress notes and discharge summary if you are evaluating and /or   treating any of the following: The medical record reflects the following:  Risk Factors: Infection of prosthetic right knee joint, MRSA bacteremia. jamshid  Clinical Indicators: On admission wbc 13.4-14, Hr-98.  t max 99.7 ,   procal-.50, SR-67, CRP-271. 2.  on 3/27 bun 28, creat-1.4 Consults note 3/26-Pt   presented with right knee swelling concern for prosthetic joint infection. Of note he had a prior prosthetic joint infection several years ago had the   knee replaced in . He did undergo a two-stage procedure for this, PN   3/28-MRSA bacteremia, secondary to R knee infection, Cult S aureus / MRSA,  BC   + Staph aureus MRSA  Treatment: vancomycin ivbp, Cefazolin IV, iv maxipime, ID consult. ivf, monitor   labs  Options provided:  -- Sepsis, present on admission  -- RT knee prosthetic infection without Sepsis  -- Other - I will add my own diagnosis  -- Disagree - Not applicable / Not valid  -- Disagree - Clinically unable to determine / Unknown  -- Refer to Clinical Documentation Reviewer    PROVIDER RESPONSE TEXT:    This patient has sepsis which was present on admission. Query created by:  Lindy Bansal on 3/31/2023 4:20 PM      Electronically signed by:  Nichelle Duran MD 3/31/2023 6:19 PM
Pt arrived from OR to PACU bay 7. Report received from OR staff. Pt arousable to voice. Surgical incisions dressings in place to R knee. Pt on 6L simple mask with Naso airway in R nare, NSR, and VSS. Will continue to monitor.
Pt resting awake in bed. Denies significant pain at this time. Essential PO meds given, all others held due to NPO status. Lantus and humalog doses altered per verbal order from [facility]. Pt is axox4 and able to answer questions and follow commands throughout assessment. Pt reports full sensation in SHEILA LE, warm to touch and can moves toes on command SHEILA.
Pt resting awake in bed. Left LE with walking boot. RLE with notable area of swelling at right knee. Active purulent drainage noted. Place ABD pad. Pt is axox4 and able to answer questions and follow commands throughout assessment. Pt reports full sensation in SHEILA LE, warm to touch and can moves toes on command SHEILA. will continue to monitor.
Pt stable and able to be transferred from PACU to room 0816. A&O , VSS, with no complaints at this time. 4T, called and notified that pt is being transferred out of PACU and to room.
Pt transferred to room 4459 at this time. A&O with no signs of distress. Report given to Everette Kilgore RN. V/u and denies questions or further needs at this time.
Resting in bed back from xray. Sates he will not eat lunch meal so received only sliding scale coverage.
Review of chart shows    Latest Reference Range & Units 3/27/23 04:31   Creatinine 0.8 - 1.3 mg/dL 1.4 (H)   (H): Data is abnormally high   Latest Reference Range & Units 3/27/23 04:31   Est, Glojuvenal Filt Rate >60  51 !   !: Data is abnormal  Spoke with RN about need for order from physician that PICC placement is okay without nephrology consult. She confirms patient has access at this time and this PICC is for home antibiotics. She will speak to MD in the morning. Advised her I will cancel this order on my end and if our services are needed after speaking with MD to call [facility] again.  Thank you
Shift assessment complete, VSS, pt A&Ox4 in bed, dressing to right knee CDI, denies any pain at this time. AM med administered per [facility], POC and education reviewed with the pt. All needs met at this time, call light in reach, will continue to monitor.
Shift assessment complete, VSS, pt A&Ox4 in bed, dressing to right knee CDI. A blister seen at right upper thing, denies any pain at this time. Coombs removed per order, external cath in place. AM med administered per [facility], POC and education reviewed with the pt. All needs met at this time, call light in reach, will continue to monitor.
Shift assessment complete. Meds given per eMAR. Removed sock due to swelling. Pt is unable to get out of bed without maxislide. Right arm full sensation and movement after tunnel central cath. Pt denies pain at this time. Call light within reach.   Electronically signed by Silvino Becker RN on 3/31/2023 at 11:40 PM
Teaching / education initiated regarding perioperative experience, expectations, and pain management during stay. Patient verbalized understanding.
(TYLENOL) tablet 650 mg, Q6H PRN   Or  acetaminophen (TYLENOL) suppository 650 mg, Q6H PRN  dextrose bolus 10% 125 mL, PRN   Or  dextrose bolus 10% 250 mL, PRN  glucagon (rDNA) injection 1 mg, PRN  dextrose 10 % infusion, Continuous PRN  morphine (PF) injection 2 mg, Q2H PRN   Or  morphine injection 4 mg, Q2H PRN  oxyCODONE (ROXICODONE) immediate release tablet 5 mg, Q4H PRN   Or  oxyCODONE (ROXICODONE) immediate release tablet 10 mg, Q4H PRN  enoxaparin Sodium (LOVENOX) injection 30 mg, BID        Objective:  /68   Pulse 73   Temp 98.8 °F (37.1 °C) (Oral)   Resp 20   Ht 5' 5\" (1.651 m)   Wt 287 lb 11.2 oz (130.5 kg)   SpO2 93%   BMI 47.88 kg/m²     Intake/Output Summary (Last 24 hours) at 4/2/2023 1148  Last data filed at 4/2/2023 0920  Gross per 24 hour   Intake 857.3 ml   Output 1300 ml   Net -442.7 ml      Wt Readings from Last 3 Encounters:   03/25/23 287 lb 11.2 oz (130.5 kg)       General appearance: Morbidly obese, appears comfortable  Eyes: Sclera clear. Pupils equal.  ENT: Moist oral mucosa. Trachea midline, no adenopathy. Cardiovascular: Regular rhythm, normal S1, S2. No murmur. Respiratory: Not using accessory muscles. Good inspiratory effort. Clear to auscultation bilaterally, no wheeze or crackles. GI: Abdomen soft, no tenderness, not distended, normal bowel sounds  Musculoskeletal: No cyanosis in digits. Neurovascularly intact. Neurology: CN 2-12 grossly intact. No speech or motor deficits  Psych: Normal affect. Alert and oriented in time, place and person  Skin: Normal turgor. Postop dressing clean, dry and intact. Left leg immobilizer in place.   Coombs catheter +    Labs and Tests:  CBC:   Recent Labs     03/31/23  0836 04/01/23  0416 04/02/23  0726   WBC 7.9 8.4 11.2*   HGB 12.2* 12.1* 12.4*    194 283     BMP:    Recent Labs     03/31/23  0836 04/01/23  0416 04/02/23  0613    136 129*   K 4.3 4.7 4.7    104 102   CO2 28 24 22   BUN 24* 20 14   CREATININE
- WB status:  NWB LLE; reviewed post op precautions. WBAT RLE in the boot. - DVT prophylaxis: ASA 81mg BID x 30 days    - Acute blood loss anemia as expected: H&H today:12.2/37.2  - PT/OT  - Pain Control: Oxy Rx in chart for DC. Continue scheduled Tylenol. Due to orthopaedic surgical procedure/condition, patient may require pain medication for up to 6-8 weeks. - ID: Vancomycin per ID  - Dispo: SNF; okay to discharge from our end once final antibiotic recommendations made and dispo needs met. Follow-up with Dr. Jacqui Marie in 2 weeks. [phone]  No future appointments.     Williams Dacosta, APRN - CNP  3/31/2023  9:15 AM
II  CAD    PLAN:   - WB status:  NWB LLE; reviewed post op precautions. WBAT RLE in the boot. - DVT prophylaxis: ASA 81mg BID x 30 days    - PT/OT  - Pain Control: Oxy Rx in chart for DC. Continue scheduled Tylenol. Due to orthopaedic surgical procedure/condition, patient may require pain medication for up to 6-8 weeks. - ID: Vancomycin per ID  - Dispo: SNF; okay to discharge from our end once final antibiotic recommendations made and dispo needs met. Follow-up with Dr. Lucia Philip in 2 weeks. [phone]  No future appointments.     PHONG Ivy - CNP  3/30/2023  10:32 AM
Resistant >2 mcg/mL   tetracycline Sensitive <=4 mcg/mL   trimethoprim-sulfamethoxazole Sensitive <=0.5/9.5 mcg/mL   vancomycin Sensitive 1 mcg/mL     3/26 R knee synovial fluid - light S aureus / MRSA  3/28 OR GS 1+WBC, no organism; cult MRSA  3/29 BC no growth to date     Imaging:   3/26 R knee x-ray  Patellar fracture of uncertain chronicity. Correlate with history and   physical exam.  Associated joint effusion may suggest that this is subacute   or acute. No complication correlating to knee prosthesis. 3/27 L ankle x-ray  Traumatic fractures of the distal fibula/lateral malleolus and medial   malleolus. Status post ORIF of the distal fibula.        Scheduled Meds:   [START ON 3/31/2023] insulin glargine  40 Units SubCUTAneous QAM    insulin lispro  10 Units SubCUTAneous TID WC    insulin lispro  0-8 Units SubCUTAneous TID WC    insulin lispro  0-4 Units SubCUTAneous Nightly    potassium phosphate IVPB  30 mmol IntraVENous Once    pantoprazole  40 mg Oral QAM AC    amLODIPine  10 mg Oral Daily    sodium chloride flush  5-40 mL IntraVENous 2 times per day    vancomycin  1,250 mg IntraVENous Q24H    [Held by provider] losartan  50 mg Oral Daily    aspirin  81 mg Oral Daily    atorvastatin  10 mg Oral Daily    levothyroxine  100 mcg Oral Daily    Vitamin D  1,000 Units Oral BID    clopidogrel  75 mg Oral Daily    metoprolol succinate  50 mg Oral Daily    sodium chloride flush  5-40 mL IntraVENous 2 times per day    enoxaparin  30 mg SubCUTAneous BID       Continuous Infusions:   sodium chloride      sodium chloride 15 mL/hr at 03/28/23 1602    dextrose         PRN Meds:  calcium carbonate, cloNIDine, sodium chloride flush, sodium chloride, sodium chloride flush, sodium chloride, ondansetron **OR** ondansetron, polyethylene glycol, acetaminophen **OR** acetaminophen, dextrose bolus **OR** dextrose bolus, glucagon (rDNA), dextrose, morphine **OR** morphine, oxyCODONE **OR** oxyCODONE      Assessment:     [facility] -
flush 0.9 % injection 5-40 mL, 2 times per day  sodium chloride flush 0.9 % injection 5-40 mL, PRN  0.9 % sodium chloride infusion, PRN  ondansetron (ZOFRAN-ODT) disintegrating tablet 4 mg, Q8H PRN   Or  ondansetron (ZOFRAN) injection 4 mg, Q6H PRN  polyethylene glycol (GLYCOLAX) packet 17 g, Daily PRN  acetaminophen (TYLENOL) tablet 650 mg, Q6H PRN   Or  acetaminophen (TYLENOL) suppository 650 mg, Q6H PRN  dextrose bolus 10% 125 mL, PRN   Or  dextrose bolus 10% 250 mL, PRN  glucagon (rDNA) injection 1 mg, PRN  dextrose 10 % infusion, Continuous PRN  insulin lispro (HUMALOG) injection vial 0-8 Units, TID WC  insulin lispro (HUMALOG) injection vial 0-4 Units, Nightly  morphine (PF) injection 2 mg, Q2H PRN   Or  morphine injection 4 mg, Q2H PRN  oxyCODONE (ROXICODONE) immediate release tablet 5 mg, Q4H PRN   Or  oxyCODONE (ROXICODONE) immediate release tablet 10 mg, Q4H PRN  enoxaparin Sodium (LOVENOX) injection 30 mg, BID      Objective:  BP (!) 165/96   Pulse 94   Temp 98.9 °F (37.2 °C) (Oral)   Resp 16   Ht 5' 5\" (1.651 m)   Wt 287 lb 11.2 oz (130.5 kg)   SpO2 93%   BMI 47.88 kg/m²     Intake/Output Summary (Last 24 hours) at 3/28/2023 0807  Last data filed at 3/28/2023 0522  Gross per 24 hour   Intake 750 ml   Output 1700 ml   Net -950 ml        Wt Readings from Last 3 Encounters:   03/25/23 287 lb 11.2 oz (130.5 kg)       General appearance:  Appears comfortable  Eyes: Sclera clear. Pupils equal.  ENT: Moist oral mucosa. Trachea midline, no adenopathy. Cardiovascular: Regular rhythm, normal S1, S2. No murmur. No edema in lower extremities  Respiratory: Not using accessory muscles. Good inspiratory effort. Clear to auscultation bilaterally, no wheeze or crackles. GI: Abdomen soft, no tenderness, not distended, normal bowel sounds  Musculoskeletal: No cyanosis in digits, neck supple  Neurology: CN 2-12 grossly intact. No speech or motor deficits  Psych: Normal affect.  Alert and oriented in time, place and
prophylaxis: ASA 81mg BID x 30 days    - PT/OT  - Pain Control: Oxy Rx in chart for DC. Continue scheduled Tylenol. Due to orthopaedic surgical procedure/condition, patient may require pain medication for up to 6-8 weeks. - ID: Vancomycin per ID  - Dispo: Await therapy eval; okay to discharge from our end once final antibiotic recommendations made and dispo needs met. Follow-up with Dr. Janine Serrano in 2 weeks. [phone]  No future appointments.     [facility], APRN - CNP  3/29/2023  9:13 AM
scheduled Tylenol. Due to orthopaedic surgical procedure/condition, patient may require pain medication for up to 6-8 weeks. - ID: Vancomycin per ID  - Dispo: SNF; okay to discharge from our end once final antibiotic recommendations made and dispo needs met. Follow-up with Dr. Fredy Grant in 2 weeks. [phone]  No future appointments.     Jens Copeland, PHONG - CNP  4/3/2023  11:17 AM
this is subacute   or acute. No complication correlating to knee prosthesis. 3/27 L ankle x-ray  Traumatic fractures of the distal fibula/lateral malleolus and medial   malleolus. Status post ORIF of the distal fibula.        Scheduled Meds:   insulin lispro  0-16 Units SubCUTAneous TID WC    amLODIPine  10 mg Oral Daily    pantoprazole  40 mg IntraVENous Daily    insulin glargine  40 Units SubCUTAneous QAM    insulin lispro  13 Units SubCUTAneous TID WC    lidocaine 1 % injection  5 mL IntraDERmal Once    sodium chloride flush  5-40 mL IntraVENous 2 times per day    vancomycin  1,250 mg IntraVENous Q24H    [Held by provider] losartan  50 mg Oral Daily    [Held by provider] aspirin  81 mg Oral Daily    atorvastatin  10 mg Oral Daily    levothyroxine  100 mcg Oral Daily    Vitamin D  1,000 Units Oral BID    [Held by provider] clopidogrel  75 mg Oral Daily    metoprolol succinate  50 mg Oral Daily    sodium chloride flush  5-40 mL IntraVENous 2 times per day    insulin lispro  0-4 Units SubCUTAneous Nightly    enoxaparin  30 mg SubCUTAneous BID       Continuous Infusions:   sodium chloride      sodium chloride 5 mL/hr at 03/28/23 0937    dextrose         PRN Meds:  calcium carbonate, cloNIDine, sodium chloride flush, sodium chloride, sodium chloride flush, sodium chloride, ondansetron **OR** ondansetron, polyethylene glycol, acetaminophen **OR** acetaminophen, dextrose bolus **OR** dextrose bolus, glucagon (rDNA), dextrose, morphine **OR** morphine, oxyCODONE **OR** oxyCODONE      Assessment:     PMH - obesity (BMI 47), DM, HTN, CAD, OA, SANTI, hypothyroid  PSurgHx - R TKA, R TKA 2 stage (space / revision 2013), L ankle ORIF 2008     L distal fibular fx 1 mo ago, in boot (no surg     R knee PJI   -  hx prior 2 starge 2013   - Cult S aureus / MRSA   - OR resection 3/28, space with vanco / tobra placed    MRSA bacteremia, secondary to R knee infection    Plan:     Cont Vancomycin  Await MRSA sensitivities  F/u [facility]
0/10  Pain Interventions: not applicable       Functional Mobility  Bed Mobility  Supine to Sit: 2 person assistance with max A of 2   Sit to Supine: 2 person assistance with max A of 2   Rolling Left: 2 person assistance with max A of 2   Rolling Right: 2 person assistance with max A of 2   Scootin person assistance with max A of 2   Comments: Pt performed rolling several times each direction for linen change due to catheter leaking. Transfers  No transfers completed on this date secondary to decreased sitting balance and difficulty with bed mobility. Comments:  Ambulation  Ambulation not tested on this date secondary to pt fatigue and safety concerns. Distance: n/a  Gait Mechanics: n/a  Comments:    Stair Mobility  Stair mobility not completed on this date. Comments:  Wheelchair Mobility:  No w/c mobility completed on this date. Comments:  Balance  Static Sitting Balance: poor (+): requires min (A) to maintain balance  Dynamic Sitting Balance: poor (+): requires min (A) to maintain balance  Comments: Pt sat EOB for 15' with sitting balance varying from CGA to mod A due to pt intermittently leaning back and to his R. Other Therapeutic Interventions  Hep provided to pt and pt was educated on each exercise: scapular squeezes, shoulder flexion, shoulder ABD, SLR, LLE supine marching, posterior pelvic tilt. Pt verbalized understanding.      Functional Outcomes  AM-PAC Inpatient Mobility Raw Score : 6              Cognition  WFL  Orientation:    alert and oriented x 4  Command Following:   [facility]    Education  Barriers To Learning: none  Patient Education: patient educated on goals, PT role and benefits, plan of care, precautions, weight-bearing education, general safety, functional mobility training, discharge recommendations  Learning Assessment:  patient verbalizes and demonstrates understanding, patient verbalizes understanding, would benefit from continued reinforcement    Assessment  Activity Tolerance:
03/27/23  0431   * 44*   * 134*   BILITOT 2.8* 1.6*   ALKPHOS 197* 180*        Xray R knee  Impression:     Patellar fracture of uncertain chronicity. Correlate with history and   physical exam.  Associated joint effusion may suggest that this is subacute   or acute. No complication correlating to knee prosthesis. Problem List  Principal Problem:    Chronic infection of prosthetic knee (HCC)  Active Problems:    Type 2 diabetes mellitus, with long-term current use of insulin (HCC)    CAD (coronary artery disease)    Primary hypertension    Morbid obesity (HCC)    Hypothyroid    SANTI on CPAP    Closed left ankle fracture  Resolved Problems:    * No resolved hospital problems. *       Assessment & Plan:   Infected R knee prosthesis. Also has fractured patella. Dicussed with ortho. Going for removal of hardware with abx spacer on Tuesday. [facility] hospital states wound cultures were positive for MRSA. Continue vanco and cefepime. Ortho on board. ID consult pending. ANU-start NS at 75/hr. Abnormal LFTs-no nausea or abdominal pain. RUQ unremarkable. ? If from sepsis. Will repeat labs in am.   DM 2-increase lantus to 40 units (take 44 at home), increase humalog to 13 (he takes up to 20 at home). Continue SSI. Hypertension-hold ARB given ANU. Start norvasc 5 mg. Continue b blocker  Sleep apnea-he is using his own cpap  Consult PT/OT      Diet: ADULT DIET;  Regular; 4 carb choices (60 gm/meal)  Diet NPO  Code:Full Code  DVT PPXlovenox      Cynthia Covarrubias PA-C   3/27/2023 12:20 PM
1.4* 1.1   GLUCOSE 291* 287*     Hepatic:   Recent Labs     03/27/23  0431 03/28/23  0445   AST 44* 26   * 96*   BILITOT 1.6* 1.0   ALKPHOS 180* 192*      Xray R knee  Impression:     Patellar fracture of uncertain chronicity. Correlate with history and   physical exam.  Associated joint effusion may suggest that this is subacute   or acute. No complication correlating to knee prosthesis. 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 *       Assessment & Plan:     Infected R knee prosthesis & fractured patella:  MRSA bacteremia:  Ortho consulted: s/p right explant of Total Knee Arthroplasty and placement of abx spacer with fusion arpit for chronic PJI(3/28/23). Repeat blood cultures NGTD. Echo 3/29/2023 no obvious vegetation seen but unable to rule out endocarditis completely due to poor visualization. Wound and OR cultures positive for MRSA. ID consulted: Currently on vancomycin. Recommended daptomycin 750 mg IV daily through 5/9/2023. PICC line placed 3/28/2023. Continue Coombs catheter with voiding trial at [facility]. Ortho cleared for discharge with outpatient follow-up in 2 weeks with Dr. Fredy Grant. NWB LLE. RLE WBAT in the boot. Aspirin 81 mg twice daily x30 days but patient already on DAPT with Plavix/aspirin. Mild creatinine elevation:  Likely due to prerenal azotemia. Resolved with IV hydration. T2DM on long-term insulin with hyperglycemia:  A1c 7.3% 3/29/2023. Monitor BG on basal/prandial insulin with SSI.       Transaminitis: Resolving  RUQ US
Activities of Daily Living  No IADL completed on this date. Functional Mobility  Bed Mobility  Supine to Sit: 2 person assistance with DEP of 2   Sit to Supine: 2 person assistance with DEP of 2   Scootin person assistance with DEP of 2   Comments: pt attempted right, left and posterior scoot, unable to clear buttocks requiring DEP assist  Transfers  No transfers completed on this date secondary to safety concerns. Comments: Pt unable to clear buttocks from EOB sit>stand x2 attempts    Functional Mobility:  Sitting Balance: stand by assistance, contact guard assistance, CGA progressing to SBA. Sitting Balance Comment: Pt tolerated EOB sit ~25 min for transfer attempts + ADL completion   No functional mobility completed on this date secondary to safety concerns. Other Therapeutic Interventions      Functional Outcomes  AM-PAC Inpatient Daily Activity Raw Score: 16    Cognition  WFL  Orientation:    alert and oriented x 4  Command Following:   [facility]     Education  Barriers To Learning: physical  Patient Education: patient educated on goals, OT role and benefits, plan of care, transfer training, discharge recommendations  Learning Assessment:  patient verbalizes and demonstrates understanding    Assessment  Activity Tolerance: Pt tolerated well, limited by physical deficits   Impairments Requiring Therapeutic Intervention: decreased functional mobility, decreased ADL status, decreased ROM, decreased strength, decreased endurance, decreased balance  Prognosis: fair  Clinical Assessment: Pt reported independence with ADLs and Mod I for functional mobility w/ SPC at his baseline level of occupational function. Pt has been using RW for ambulation since L ankle fracture 1 month ago. Today, pt required Max A of 2 for bed mobility, SBA for sitting balance, DEP for toileting and did not participate in OOB activities secondary to safety concerns.  He will benefit from continued skilled acute OT services to address
CREATININE 0.9 0.8 0.9   GLUCOSE 105* 152* 258*     Hepatic:   Recent Labs     04/01/23  0416 04/02/23  0613 04/03/23  1000   AST 29 26 29   ALT 34 28 29   BILITOT 0.5 0.7 0.5   ALKPHOS 162* 151* 150*      Xray R knee  Impression:     Patellar fracture of uncertain chronicity. Correlate with history and   physical exam.  Associated joint effusion may suggest that this is subacute   or acute. No complication correlating to knee prosthesis. 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 *         Assessment & Plan:     Infected R knee prosthesis & fractured patella:  MRSA bacteremia:  Ortho consulted: s/p right explant of Total Knee Arthroplasty and placement of abx spacer with fusion arpit for chronic PJI(3/28/23). Repeat blood cultures 3/29/23  NGTD. Echo 3/29/2023 no obvious vegetation seen but unable to rule out endocarditis completely due to poor visualization. Wound and OR cultures positive for MRSA. ID consulted: Currently on IV vancomycin. Recommended daptomycin 750 mg IV daily through 5/9/2023. Initial dose to be given prior to discharge. Tunneled CVC line placed by IR 3/31/2023. Ortho cleared for discharge with outpatient follow-up in 2 weeks with Dr. Bard Penny. NWB LLE. RLE WBAT in the boot. Aspirin 81 mg twice daily x30 days but patient already on DAPT with Plavix/aspirin. Mild creatinine elevation:  Likely due to prerenal azotemia. Resolved with IV hydration. Hyponatremia: Resolved. Monitor sodium level.     T2DM on long-term insulin with
Left: contact guard assistance  Rolling Right: contact guard assistance  Scootin person assistance with max Ax2   Comments: transfers to sitting with HOB elevated and bed rail us  Transfers  No transfers completed on this date secondary to safety concerns. Comments: Pt unable to clear buttocks from EOB with scoots, pt declines trial to stand    Functional Mobility:  Sitting Balance: stand by assistance, contact guard assistance, CGA progressing to SBA. Sitting Balance Comment: Pt tolerated EOB sit ~10 min    No functional mobility completed on this date secondary to safety concerns. Other Therapeutic Interventions      Functional Outcomes  AM-PAC Inpatient Daily Activity Raw Score: 14    Cognition  WFL  Orientation:    alert and oriented x 4  Command Following:   [facility]     Education  Barriers To Learning: physical  Patient Education: patient educated on goals, OT role and benefits, plan of care, transfer training, discharge recommendations  Learning Assessment:  patient verbalizes and demonstrates understanding    Assessment  Activity Tolerance: Pt tolerated well, limited by physical deficits   Impairments Requiring Therapeutic Intervention: decreased functional mobility, decreased ADL status, decreased ROM, decreased strength, decreased endurance, decreased balance  Prognosis: fair  Clinical Assessment: Pt reported independence with ADLs and Mod I for functional mobility w/ SPC at his baseline level of occupational function. Pt has been using RW for ambulation since L ankle fracture 1 month ago. He will benefit from continued skilled acute OT services to address the above deficits and to maximize safety and independence.    Safety Interventions: patient left in bed, bed alarm in place, call light within reach, gait belt, patient at risk for falls, and nurse notified    Plan  Frequency: 7 x/week  Current Treatment Recommendations: strengthening, ROM, balance training, functional mobility training, transfer
assistance  Scooting: Max Ax3  Comments: Pt performed rolling at end of session for linen adjustment. Transfers  Sit to stand transfer: Attempted x2 however pt unable to achieve hip clearance despite Max Ax2 assist  Comments:  Ambulation  Ambulation not tested on this date secondary to pt fatigue and safety concerns. Distance: n/a  Gait Mechanics: n/a  Comments:    Stair Mobility  Stair mobility not completed on this date. Comments:  Wheelchair Mobility:  No w/c mobility completed on this date. Comments:  Balance  Static Sitting Balance: fair (-): maintains balance at CGA>>>SBA with use of UE support  Dynamic Sitting Balance: fair (-): maintains balance at CGA with use of UE support  Comments: Pt sat EOB ~20-30 minutes during ADLs, discussion with MD, and during attempted transfers. Other Therapeutic Interventions    Functional Outcomes  AM-PAC Inpatient Mobility Raw Score : 8              Cognition  WFL  Orientation:    alert and oriented x 4  Command Following:   [facility]    Education  Barriers To Learning: none  Patient Education: patient educated on goals, PT role and benefits, plan of care, precautions, weight-bearing education, general safety, functional mobility training, discharge recommendations  Learning Assessment:  patient verbalizes and demonstrates understanding, patient verbalizes understanding, would benefit from continued reinforcement    Assessment  Activity Tolerance: Fair, pt noted to be SOB intermittently throughout session however SpO2 at 93-95% on RA throughout. Impairments Requiring Therapeutic Intervention: decreased functional mobility, decreased ROM, decreased strength, decreased safety awareness, decreased endurance, decreased balance  Prognosis: fair  Clinical Assessment: Pt presents with the above deficits impairing his ability to perform functional mobility safely and independently.  Pt reporting PLOF of MOD I with RW and currently requires 2 person assist for all mobility and
completed on this date secondary to decreased sitting balance and safety. Comments: 2 sit to stand attempts from elevated bed to RW with dependent of 2 but unable to clear buttocks from bed (limited initiation from patient and quad firing on LLE)    Ambulation  Ambulation not tested on this date secondary to pt fatigue and safety concerns. Distance: n/a  Gait Mechanics: n/a  Comments:    Stair Mobility  Stair mobility not completed on this date. Comments:  Wheelchair Mobility:  No w/c mobility completed on this date. Comments:  Balance  Static Sitting Balance: fair (-): maintains balance at CGA with use of UE support  Dynamic Sitting Balance: poor (+): requires min (A) to maintain balance  Comments: EOB balance x25 min for ADL tasks, pt unable to move inside or outside of [place] without LOB, pt required BUE support to maintain midline positioning    Other Therapeutic Interventions      Functional Outcomes  AM-PAC Inpatient Mobility Raw Score : 6              Cognition  WFL  Orientation:    alert and oriented x 4  Command Following:   [facility]    Education  Barriers To Learning: none  Patient Education: patient educated on goals, PT role and benefits, plan of care, precautions, weight-bearing education, general safety, functional mobility training, discharge recommendations  Learning Assessment:  patient verbalizes and demonstrates understanding, patient verbalizes understanding, would benefit from continued reinforcement    Assessment  Activity Tolerance: Fair, pt reported increased fatigue with limited activity this session  Impairments Requiring Therapeutic Intervention: decreased functional mobility, decreased ROM, decreased strength, decreased safety awareness, decreased endurance, decreased balance  Prognosis: fair  Clinical Assessment: Pt continues to require assist of 2 for all safe mobility and require increased time to complete all tasks.   Pt would benefit from acute PT services to address deficits and
declined all other ADLs. Reports that his wife assisted with ADLs yesterday  Instrumental Activities of Daily Living  No IADL completed on this date. Functional Mobility  Bed Mobility  Supine to Sit: 2 person assistance with Max A   Sit to Supine: 2 person assistance with Max A   Rolling Left: 2 person assistance with Max A   Rolling Right: 2 person assistance with Max A   Scootin person assistance with Max A   Comments: pt rolled L and R several times for bedding change and jhony hygiene   Transfers  No transfers completed on this date secondary to safety concerns. Comments:   Functional Mobility:  Sitting Balance: CGA-Mod A. Sitting Balance Comment: Pt sat EOB for ~15 minutes, requiring CGA to Mod A throughout     Other Therapeutic Interventions  Pt provided with handout of UE exercises that can be completed in bed (scapular retraction, shoulder flexion, shoulder adduction). Pt encouraged to complete exercises several times throughout the day. Pt verbalized understanding of exercises. Functional Outcomes  AM-PAC Inpatient Daily Activity Raw Score: 14    Cognition  WFL  Orientation:    alert and oriented x 4  Command Following:   [facility]     Education  Barriers To Learning: physical  Patient Education: patient educated on goals, OT role and benefits, plan of care, transfer training, discharge recommendations  Learning Assessment:  patient verbalizes and demonstrates understanding    Assessment  Activity Tolerance: Pt tolerated well, limited by physical deficits   Impairments Requiring Therapeutic Intervention: decreased functional mobility, decreased ADL status, decreased ROM, decreased strength, decreased endurance, decreased balance  Prognosis: fair  Clinical Assessment: Pt reported independence with ADLs and Mod I for functional mobility w/ SPC at his baseline level of occupational function. Pt has been using RW for ambulation since L ankle fracture 1 month ago.  Today, pt required Max A of 2 for bed
glucagon (rDNA), dextrose, morphine **OR** morphine, oxyCODONE **OR** oxyCODONE      Assessment:     PMH - obesity (BMI 47), DM, HTN, CAD, OA, SANTI, hypothyroid  PSurgHx - R TKA, R TKA 2 stage (space / revision 2013), L ankle ORIF 2008     L distal fibular fx 1 mo ago, in boot (no surg     R knee PJI   -  hx prior 2 starge 2013   - Cult S aureus / MRSA   - OR resection today    MRSA bacteremia, secondary to R knee infection    Plan:     Cont Vancomycin  D/c Ancef    Await OR report    F/u [facility] 3/29      Medical Decision Making:   The following items were considered in medical decision making:  Discussion of patient care with other providers  Reviewed clinical lab tests  Reviewed radiology tests  Microbiology cultures and other micro tests reviewed      Discussed with pt  Genaro Arnold MD
guard assistance  Grooming Comments: Washed face seated EOB  Toileting Comments: Coombs catheter in place   General Comments: pt declined all other ADLs. Reports that his wife is bringing him toiletries and will help with ADLs later today  Instrumental Activities of Daily Living  No IADL completed on this date. Functional Mobility  Bed Mobility  Supine to Sit: 2 person assistance with Min A   Sit to Supine: 2 person assistance with Max A   Scooting: Max of 3   Comments: HOB elevated  Transfers  Sit to stand transfer:2 person assistance with Max A   Comments: two attempts, but no clearance of EOB on either attempt  Functional Mobility:  Sitting Balance: CGA progressing to SBA. Sitting Balance Comment: Pt sat EOB for ~30 minutes    Other Therapeutic Interventions    Functional Outcomes  AM-PAC Inpatient Daily Activity Raw Score: 14    Cognition  WFL  Orientation:    alert and oriented x 4  Command Following:   [facility]     Education  Barriers To Learning: physical  Patient Education: patient educated on goals, OT role and benefits, plan of care, transfer training, discharge recommendations  Learning Assessment:  patient verbalizes and demonstrates understanding    Assessment  Activity Tolerance: Pt tolerated well, limited by physical deficits   Impairments Requiring Therapeutic Intervention: decreased functional mobility, decreased ADL status, decreased ROM, decreased strength, decreased endurance, decreased balance  Prognosis: fair  Clinical Assessment: Pt reported independence with ADLs and Mod I for functional mobility w/ SPC at his baseline level of occupational function. Pt has been using RW for ambulation since L ankle fracture 1 month ago. Today, pt required Min-Max A of 2 for bed mobility, CGA progressing to SBA for sitting balance, and Max A of 2 to attempt a stand (no clearance from EOB on either attempt).  He will benefit from continued skilled acute OT services to address the above deficits and to maximize
history and   physical exam.  Associated joint effusion may suggest that this is subacute   or acute. No complication correlating to knee prosthesis. Problem List  Principal Problem:    Infection of prosthetic knee joint, initial encounter ([address]  Active Problems:    Type 2 diabetes mellitus, with long-term current use of insulin (HCC)    CAD (coronary artery disease)    Primary hypertension    Morbid obesity (HCC)    Hypothyroid    SANTI on CPAP    Closed left ankle fracture  Resolved Problems:    * No resolved hospital problems. *       Assessment & Plan:   Possible R knee abscess-patient has fluctuant mass over R knee although he states it has been like this for two months, has mild erythema and was draining yesterday. [facility] hospital states cultures were positive for MRSA. Continue vanco and cefepime. Ortho and ID consulted. Abnormal LFTs-no nausea or abdominal pain. RUQ ultrasound in am.   DM 2-resume lantus but at 30 units, humalog but decrease to 10. Continue SSI. Hypertension-resume B blocker and losartan-will decrease losartan dose to 50 mg.       Diet: Diet NPO  Code:Full Code  DVT PPXlovenox      Debo Roman PA-C   3/26/2023 11:44 AM
complete all tasks this visit. Pt tolerated sitting balance and required less assistance this date. However, pt Max A of 2 for supine<>sit. Pt would benefit from acute PT services to address deficits, maximize safety, and facilitate return to PLOF. Safety Interventions: patient left in bed, bed alarm in place, call light within reach, gait belt, patient at risk for falls, and nurse notified    Plan  Frequency: 7 x/week  Current Treatment Recommendations: strengthening, balance training, functional mobility training, endurance training, patient/caregiver education, home exercise program, safety education, equipment evaluation/education, and positioning    Goals  Patient Goals: pt did not state   Short Term Goals:  Time Frame: upon d/c  Short term goal 1: Pt will perform bed mobility with Min Ax1 - goal met with ROLLING on 04/03   New goal: Patient will perform sup>sit transfer at minimal assistance of 1. Short term goal 2: Pt will perform transfers with LRAD and Max Ax1  Short term goal 3: Pt will propel self in w/c 22' with Min Ax1    All goals addressed and updated as necessary.  04/03    Therapy Session Time      Individual Group Co-treatment   Time In     0932   Time Out     1010   Minutes     38     Timed Code Treatment Minutes:  38 Minutes  Total Treatment Minutes:  45       Electronically Signed By: Zetta Carrel, PTA
on basal/prandial insulin with SSI. Transaminitis: Resolving  RUQ US unremarkable. Asymptomatic. Monitor LFTs. GERD: Continue Protonix. HTN: Monitor BP on amlodipine and metoprolol. Hold losartan. SANTI: On CPAP. Morbid obesity: Body mass index is 47.88 kg/m². BMI Complicating assessment and treatment. Placing patient at risk for multiple co-morbidities as well as early death and contributing to the patient's presentation. Education, and counseling provided. Diet: ADULT DIET; Regular; 4 carb choices (60 gm/meal); Low Sodium (2 gm)  Diet NPO Exceptions are: Sips of Water with Meds  Code:Full Code  DVT PPXlovenox    PT OT: Recommending SNF    Disposition: Once medically stable. Anticipate DC tomorrow.     Lori Gonzalez MD   3/30/2023 1:18 PM
patient verbalizes and demonstrates understanding, patient verbalizes understanding, would benefit from continued reinforcement    Assessment  Activity Tolerance: Fair, pt reported increased fatigue with limited activity this session  Impairments Requiring Therapeutic Intervention: decreased functional mobility, decreased ROM, decreased strength, decreased safety awareness, decreased endurance, decreased balance  Prognosis: fair  Clinical Assessment: Pt continues to require assist of 2 for all safe mobility and require increased time to complete all tasks this visit. Pt would benefit from acute PT services to address deficits, maximize safety, and facilitate return to [facility]. Safety Interventions: patient left in bed, bed alarm in place, call light within reach, gait belt, patient at risk for falls, and nurse notified    Plan  Frequency: 7 x/week  Current Treatment Recommendations: strengthening, balance training, functional mobility training, transfer training, gait training, endurance training, neuromuscular re-education, wheelchair mobility training, modalities, patient/caregiver education, home exercise program, safety education, equipment evaluation/education, and positioning    Goals  Patient Goals: pt did not state   Short Term Goals:  Time Frame: upon d/c  Short term goal 1: Pt will perform bed mobility with Min Ax1 - goal met with ROLLING on 04/03   New goal: Patient will perform sup>sit transfer at minimal assistance of 1. Short term goal 2: Pt will perform transfers with LRAD and Max Ax1  Short term goal 3: Pt will propel self in w/c 22' with Min Ax1    All goals addressed and updated as necessary. 04/03    Therapy Session Time      Individual Group Co-treatment   Time In     1344   Time Out     1427   Minutes     43     Timed Code Treatment Minutes:  37 Minutes  Total Treatment Minutes:  37       Electronically Signed By: ASA Soler  PT observed and directed the above evaluation/treatment.   Pierce Fields
with hypoglycemia:  A1c 7.3% 3/29/2023. Monitor BG on basal/prandial insulin with SSI. Insulin doses decreased. Transaminitis: Resolving  RUQ US unremarkable. Asymptomatic. Monitor LFTs. GERD: Continue Protonix. HTN: Monitor BP on amlodipine and metoprolol. Hold losartan. SANTI: On CPAP. Morbid obesity: Body mass index is 47.88 kg/m². BMI Complicating assessment and treatment. Placing patient at risk for multiple co-morbidities as well as early death and contributing to the patient's presentation. Education, and counseling provided. Hypophosphatemia: Replaced. Monitor electrolytes and replace as needed. Diet: ADULT DIET; Regular; 4 carb choices (60 gm/meal)  Code:Full Code  DVT PPXlovenox    PT OT: Recommending SNF    Disposition: Awaiting placement.     Sirisha Hernández MD   4/1/2023 11:16 AM
within reach, gait belt, patient at risk for falls, and nurse notified    Plan  Frequency: 7 x/week  Current Treatment Recommendations: strengthening, ROM, balance training, functional mobility training, transfer training, endurance training, and ADL/self-care training    Goals  Patient Goals: not stated   Short Term Goals:  Time Frame: discharge   Patient will complete lower body ADL at moderate assistance, w/ AE as needed   Patient will complete functional transfers at moderate assistance, w/ RW   Patient will increase functional sitting balance to supervision for improved ADL completion  Patient will increase functional standing balance to mod A w/ RW for improved ADL completion  Patient will complete bed mobility at moderate assistance, goal met 4/3, upgrade min A    No goals met 4/01, ongoing, 4/03    Therapy Session Time     Individual Group Co-treatment   Time In    1344   Time Out    1427   Minutes    43        Timed Code Treatment Minutes:   43 minutes  Total Treatment Minutes:  43 minutes       Electronically Signed By: Rikki SINGLETON OTR/L [id number]

## 2023-04-04 NOTE — TELEPHONE ENCOUNTER
Spoke with Anastasiia Kwan at [facility] verified orders of :  Daptomycin 750mg daily thru 5/9  And weekly labs, drawn on Monday or Tuesday and faxed to [phone]  CBC w Diff, CMP, ESR, CRP, CK

## 2023-04-04 NOTE — PLAN OF CARE
[address] [facility] [address] Surgery  Plan of Care Note        Patient seen sitting up in the bed. He sustained a left distal fibula fracture back on 3/3/2023 which was operatively treated nonoperatively by Dr. Efren Lopez in a tall walking boot. He has been nonweightbearing since that time. He has not followed up since the hospital visit. He had been on gentamycin for suppression abx after his two stage revision due to positive cultures per patient report; unsure of timeline. Gram stain from knee aspirate yesterday:  1+ gram + cocci  4+ Poly WBCS    I spoke with his daughter by phone.     Plan:  -Plain films of the left ankle ordered today; Continue boot  -Plan for right knee hardware removal with placement of antibiotic spacer and possible patellar fixation with Dr. Dariela Eason tomorrow, 3/28 at 9:30 AM.  - NPO after midnight  - Hold ASA/Plavix  - Bedrest  - Pain control  - Verify informed consent  - Appreciate pre-op clearance from hospitalist       PHONG Chawla - CNP 3/27/2023 11:28 AM
Problem: Discharge Planning  Goal: Discharge to home or other facility with appropriate resources  4/1/2023 2323 by Annamaria Anglin RN  Outcome: Progressing  4/1/2023 1431 by Shellie Sow RN  Outcome: Progressing     Problem: Safety - Adult  Goal: Free from fall injury  4/1/2023 2323 by Annamaria Anglin RN  Outcome: Progressing  4/1/2023 1431 by Shellie Sow RN  Outcome: Progressing     Problem: ABCDS Injury Assessment  Goal: Absence of physical injury  4/1/2023 2323 by Annamaria Anglin RN  Outcome: Progressing  4/1/2023 1431 by Shellie Sow RN  Outcome: Progressing     Problem: Chronic Conditions and Co-morbidities  Goal: Patient's chronic conditions and co-morbidity symptoms are monitored and maintained or improved  4/1/2023 2323 by Annamaria Anglin RN  Outcome: Progressing  4/1/2023 1431 by Shellie Sow RN  Outcome: Progressing     Problem: Pain  Goal: Verbalizes/displays adequate comfort level or baseline comfort level  4/1/2023 2323 by Annamaria Anglin RN  Outcome: Progressing  4/1/2023 1431 by Shellie Sow RN  Outcome: Progressing     Problem: Skin/Tissue Integrity  Goal: Absence of new skin breakdown  Description: 1. Monitor for areas of redness and/or skin breakdown  2. Assess vascular access sites hourly  3. Every 4-6 hours minimum:  Change oxygen saturation probe site  4. Every 4-6 hours:  If on nasal continuous positive airway pressure, respiratory therapy assess nares and determine need for appliance change or resting period.   4/1/2023 2323 by Annamaria Anglin RN  Outcome: Progressing  4/1/2023 1431 by Shellie Sow RN  Outcome: Progressing     Problem: Neurosensory - Adult  Goal: Achieves maximal functionality and self care  4/1/2023 2323 by Annamaria Anglin RN  Outcome: Progressing  4/1/2023 1431 by Shellie Sow RN  Outcome: Progressing     Problem: Skin/Tissue Integrity - Adult  Goal: Incisions, wounds, or drain sites healing without S/S of infection  4/1/2023 2323 by Annamaria Anglin
Problem: Discharge Planning  Goal: Discharge to home or other facility with appropriate resources  Outcome: Completed     Problem: Safety - Adult  Goal: Free from fall injury  Outcome: Completed     Problem: ABCDS Injury Assessment  Goal: Absence of physical injury  Outcome: Completed     Problem: Chronic Conditions and Co-morbidities  Goal: Patient's chronic conditions and co-morbidity symptoms are monitored and maintained or improved  Outcome: Completed     Problem: Pain  Goal: Verbalizes/displays adequate comfort level or baseline comfort level  Outcome: Completed     Problem: Skin/Tissue Integrity  Goal: Absence of new skin breakdown  Description: 1. Monitor for areas of redness and/or skin breakdown  2. Assess vascular access sites hourly  3. Every 4-6 hours minimum:  Change oxygen saturation probe site  4. Every 4-6 hours:  If on nasal continuous positive airway pressure, respiratory therapy assess nares and determine need for appliance change or resting period.   Outcome: Completed     Problem: Neurosensory - Adult  Goal: Achieves maximal functionality and self care  Outcome: Completed     Problem: Skin/Tissue Integrity - Adult  Goal: Incisions, wounds, or drain sites healing without S/S of infection  Outcome: Completed     Problem: Musculoskeletal - Adult  Goal: Return mobility to safest level of function  Outcome: Completed     Problem: Genitourinary - Adult  Goal: Urinary catheter remains patent  Outcome: Completed     Problem: Infection - Adult  Goal: Absence of infection at discharge  Outcome: Completed     Problem: Metabolic/Fluid and Electrolytes - Adult  Goal: Hemodynamic stability and optimal renal function maintained  Outcome: Completed  Goal: Glucose maintained within prescribed range  Outcome: Completed
Problem: Discharge Planning  Goal: Discharge to home or other facility with appropriate resources  Outcome: Progressing     Problem: Safety - Adult  Goal: Free from fall injury  Outcome: Progressing     Problem: ABCDS Injury Assessment  Goal: Absence of physical injury  Outcome: Progressing
Problem: Discharge Planning  Goal: Discharge to home or other facility with appropriate resources  Outcome: Progressing     Problem: Safety - Adult  Goal: Free from fall injury  Outcome: Progressing     Problem: ABCDS Injury Assessment  Goal: Absence of physical injury  Outcome: Progressing     Problem: Chronic Conditions and Co-morbidities  Goal: Patient's chronic conditions and co-morbidity symptoms are monitored and maintained or improved  Outcome: Progressing     Problem: Pain  Goal: Verbalizes/displays adequate comfort level or baseline comfort level  Outcome: Progressing     Problem: Skin/Tissue Integrity  Goal: Absence of new skin breakdown  Description: 1. Monitor for areas of redness and/or skin breakdown  2. Assess vascular access sites hourly  3. Every 4-6 hours minimum:  Change oxygen saturation probe site  4. Every 4-6 hours:  If on nasal continuous positive airway pressure, respiratory therapy assess nares and determine need for appliance change or resting period.   Outcome: Progressing     Problem: Neurosensory - Adult  Goal: Achieves maximal functionality and self care  Outcome: Progressing     Problem: Skin/Tissue Integrity - Adult  Goal: Incisions, wounds, or drain sites healing without S/S of infection  Outcome: Progressing     Problem: Musculoskeletal - Adult  Goal: Return mobility to safest level of function  Outcome: Progressing     Problem: Genitourinary - Adult  Goal: Urinary catheter remains patent  Outcome: Progressing     Problem: Infection - Adult  Goal: Absence of infection at discharge  Outcome: Progressing     Problem: Metabolic/Fluid and Electrolytes - Adult  Goal: Hemodynamic stability and optimal renal function maintained  Outcome: Progressing     Problem: Metabolic/Fluid and Electrolytes - Adult  Goal: Glucose maintained within prescribed range  Outcome: Progressing
Problem: Discharge Planning  Goal: Discharge to home or other facility with appropriate resources  Outcome: Progressing     Problem: Safety - Adult  Goal: Free from fall injury  Outcome: Progressing     Problem: ABCDS Injury Assessment  Goal: Absence of physical injury  Outcome: Progressing     Problem: Chronic Conditions and Co-morbidities  Goal: Patient's chronic conditions and co-morbidity symptoms are monitored and maintained or improved  Outcome: Progressing     Problem: Pain  Goal: Verbalizes/displays adequate comfort level or baseline comfort level  Outcome: Progressing     Problem: Skin/Tissue Integrity  Goal: Absence of new skin breakdown  Description: 1. Monitor for areas of redness and/or skin breakdown  2. Assess vascular access sites hourly  3. Every 4-6 hours minimum:  Change oxygen saturation probe site  4. Every 4-6 hours:  If on nasal continuous positive airway pressure, respiratory therapy assess nares and determine need for appliance change or resting period.   Outcome: Progressing     Problem: Neurosensory - Adult  Goal: Achieves maximal functionality and self care  Outcome: Progressing     Problem: Skin/Tissue Integrity - Adult  Goal: Incisions, wounds, or drain sites healing without S/S of infection  Outcome: Progressing     Problem: Musculoskeletal - Adult  Goal: Return mobility to safest level of function  Outcome: Progressing     Problem: Genitourinary - Adult  Goal: Urinary catheter remains patent  Outcome: Progressing     Problem: Infection - Adult  Goal: Absence of infection at discharge  Outcome: Progressing     Problem: Metabolic/Fluid and Electrolytes - Adult  Goal: Hemodynamic stability and optimal renal function maintained  Outcome: Progressing  Goal: Glucose maintained within prescribed range  Outcome: Progressing
Problem: Safety - Adult  Goal: Free from fall injury  3/26/2023 1051 by Antonio Royal RN  Outcome: Progressing  Note: Pt remains free from falls. Safety precautions in place. Bed in lowest position, bed/chair wheels locked, call light with in reach, bedside table in reach, bed/chair alarm on, fall risk wrist band on, SAFE outside of doorway. Will continue to monitor.
Problem: Safety - Adult  Goal: Free from fall injury  Outcome: Progressing
Problem: Safety - Adult  Goal: Free from fall injury  Outcome: Progressing
Problem: Safety - Adult  Goal: Free from fall injury  Outcome: Progressing     Problem: Discharge Planning  Goal: Discharge to home or other facility with appropriate resources  Outcome: Progressing     Problem: ABCDS Injury Assessment  Goal: Absence of physical injury  Outcome: Progressing     Problem: Chronic Conditions and Co-morbidities  Goal: Patient's chronic conditions and co-morbidity symptoms are monitored and maintained or improved  Outcome: Progressing     Problem: Pain  Goal: Verbalizes/displays adequate comfort level or baseline comfort level  Outcome: Progressing     Problem: Skin/Tissue Integrity  Goal: Absence of new skin breakdown  Description: 1. Monitor for areas of redness and/or skin breakdown  2. Assess vascular access sites hourly  3. Every 4-6 hours minimum:  Change oxygen saturation probe site  4. Every 4-6 hours:  If on nasal continuous positive airway pressure, respiratory therapy assess nares and determine need for appliance change or resting period.   Outcome: Progressing     Problem: Neurosensory - Adult  Goal: Achieves maximal functionality and self care  Outcome: Progressing     Problem: Skin/Tissue Integrity - Adult  Goal: Incisions, wounds, or drain sites healing without S/S of infection  Outcome: Progressing     Problem: Musculoskeletal - Adult  Goal: Return mobility to safest level of function  Outcome: Progressing     Problem: Genitourinary - Adult  Goal: Urinary catheter remains patent  Outcome: Progressing     Problem: Metabolic/Fluid and Electrolytes - Adult  Goal: Hemodynamic stability and optimal renal function maintained  Outcome: Progressing  Goal: Glucose maintained within prescribed range  Outcome: Progressing     Problem: Infection - Adult  Goal: Absence of infection at discharge  Outcome: Progressing
Adult  Goal: Absence of infection at discharge  4/2/2023 1002 by Makenzie Buck RN  Outcome: Progressing  4/1/2023 2323 by Mike Leyva RN  Outcome: Progressing     Problem: Metabolic/Fluid and Electrolytes - Adult  Goal: Hemodynamic stability and optimal renal function maintained  4/2/2023 1002 by Makenzie Buck RN  Outcome: Progressing  4/1/2023 2323 by Mike Leyva RN  Outcome: Progressing  Goal: Glucose maintained within prescribed range  4/2/2023 1002 by Makenzie Buck RN  Outcome: Progressing  4/1/2023 2323 by Mike Leyva RN  Outcome: Progressing
infection  4/3/2023 1455 by Bryan Guerin RN  Outcome: Progressing  4/3/2023 0147 by Cindy Dowling RN  Outcome: Progressing     Problem: Musculoskeletal - Adult  Goal: Return mobility to safest level of function  4/3/2023 1455 by Bryan Guerin RN  Outcome: Progressing  4/3/2023 0147 by Cindy Dowling RN  Outcome: Progressing     Problem: Genitourinary - Adult  Goal: Urinary catheter remains patent  4/3/2023 1455 by Bryan Guerin RN  Outcome: Progressing  4/3/2023 0147 by Cindy Dowilng RN  Outcome: Progressing     Problem: Infection - Adult  Goal: Absence of infection at discharge  4/3/2023 1455 by Bryan Guerin RN  Outcome: Progressing  4/3/2023 0147 by Cindy Dowling RN  Outcome: Progressing     Problem: Metabolic/Fluid and Electrolytes - Adult  Goal: Hemodynamic stability and optimal renal function maintained  4/3/2023 1455 by Bryan Guerin RN  Outcome: Progressing  4/3/2023 0147 by Cindy Dowling RN  Outcome: Progressing  Goal: Glucose maintained within prescribed range  4/3/2023 1455 by Bryan Guerin RN  Outcome: Progressing  4/3/2023 0147 by Cindy Dowling RN  Outcome: Progressing

## 2023-04-04 NOTE — DISCHARGE SUMMARY
normal in size and function. TAPSE: 2.28 cm. RVS   velocity: 12.8 cm/s. -IVC size is dilated (>2.1 cm) and collapses < 50% with respiration   consistent with elevated RA pressure (15 mmHg). -Unable to estimate pulmonary artery pressure secondary to incomplete TR jet   envelope. -Cannot rule out valvular vegetations due to poor visualization. Invasive procedures and treatments. 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 Patient is a pleasant 68-year-old male who presented from an outside hospital with an infected right knee. He has  a previous history of right total knee replacement in 2013 with subsequent two-stage procedure for for infection following surgery. Several weeks ago he noticed increased prominence over the anteromedial aspect associated with redness. X-ray in the emergency room revealed a patellar fracture, uncertain age, as well as a joint effusion. Patient was admitted and started on broad-spectrum antibiotics. He was seen by infectious disease as well as orthopedic surgery. Infected R knee prosthesis & fractured patella: Orthopedic surgery and ID was consulted. Blood cultures were positive for MRSA. Orthopedic surgery recommended explant of the total knee prosthesis and placement of an antibiotic spacer with fusion arpit for chronic prosthetic joint infection. This was performed by Dr. Delmy Johnson on March 28. Patient tolerated the procedures well. He did have an echocardiogram however valvular vegetations cannot be completely ruled out due to poor visualization. ID is aware of this however the patient is going to be treated with 6 weeks of IV antibiotics anyway. Patient will need to have a repeat echocardiogram in 6 to 8 weeks. He was seen by PT and OT who recommended skilled nursing facility. Patient will receive IV daptomycin 750 mg IV daily through May 9.   He

## 2023-04-04 NOTE — CARE COORDINATION
04/04/23 1215   IMM Letter   IMM Letter given to Patient/Family/Significant other/Guardian/POA/by: Patient   IMM Letter date given: 04/04/23   IMM Letter time given: 1115     Copy given to the patient    ALLEN MillerN RN    [facility]  Phone: [phone]
Chart reviewed and it appears that patient has minimal needs for discharge at this time. Discussed with patients nurse and requested that case management be notified if discharge needs are identified. Case management will continue to follow progress and update discharge plan as needed.       Electronically signed by PATEL Rodriguez on 3/27/2023 at 8:29 AM
Discharge Plan:     Patient discharged to:    [address] [place], [address]    SW/DC Planner faxed, [address] and JESSICA to: [phone]    Narcotic Prescriptions faxed were: yes    RN: will call report to: [phone]    Medical Transport with: [facility] [phone]     time: 65    Family advised of discharge?: yes, spouse    HENS Submitted?: N/A, Patient is out of sate. Ashanti Rivers will file the HENS. All discharge needs met per case management.     MEMO Byers RN    [facility]  Phone: [phone]
Discharge Planning Note:      IR Placed: Tunneled central venous catheter placement     Met with the patient and talked with the spouse on the phone. Patient is requesting the following referral to be placed:    [facility]  [address]    Phone: [phone]  Fax: [phone]    Admission: [facility]via [facility]: Cell: [phone]    Note: Patient is out of network with SNF. The patient would have to pay $225.00 per day. Met with the patient and the patient is agreeable to the daily fee. Mina Howard from admissions.    - Pre-Cert will be started. Will continue to follow.     MEMO Bruno RN    [facility]  Phone: [phone]
Discharge Planning Note:     [facility] - ACCEPTED     Pre-Cert - STARTED 48/21/1820 at [facility] ID: [id number]     Katt Hernandez ID: [id number]     Dates: 04/03/2023 to 04/05/2023     Next Review Date: 04/05/2023     Status: APPROVED     Will continue to follow.      ALLEN CardenasN RN    [facility]  Phone: [phone]
Discharge Planning Note:    [facility] - ACCEPTED    Pre-Cert - STARTED 99/04/1673 at 1    Plan Auth ID:    Katt Hernandez ID: [id number]    Dates:    Next Review Date:    Status: PENDING    Will continue to follow.     MEMO Cardenas RN    [facility]  Phone: [phone]
Discharge Planning Note:    Follow up on referral:    - [facility] - DENIED, no beds available    The following has no beds available    - [facility]    Will continue to follow.     ALLEN MillerN RN    [facility]  Phone: [phone]
[phone] Fax: [phone]

## 2023-04-05 ENCOUNTER — CLINICAL DOCUMENTATION (OUTPATIENT)
Dept: INFECTIOUS DISEASES | Age: 78
End: 2023-04-05

## 2023-04-05 NOTE — PROGRESS NOTES
Dr. Maryan Narvaez has placed a referral order for pharmacist to manage Outpatient Parental Antimicrobial Therapy (OPAT) pursuant the ID Collaborative Practice Agreement. Patient and/or caregiver has verbally consented to have drug therapy managed by a pharmacist. The benefits and risks of complex antimicrobial therapy including drug-specific adverse reactions and necessary follow-up were discussed with patient and/or caregiver and they are amenable to OPAT and pharmacist management. Pertinent PMH and HPI:  PMH CAD, htn, T2DM, hypothyroidism, SANTI, obesity  PSH R TKA, R TKA 2 stage revision in 2013, L ankle ORIF in 2008, L fibular fixation 2023    P/w R knee pain and swelling x 2 days    Pertinent Objective Data: Wt Readings from Last 1 Encounters:   03/25/23 287 lb 11.2 oz (130.5 kg)      BMI Readings from Last 1 Encounters:   04/03/23 47.88 kg/m²   Adjusted body wt= 88.9 kg    SCr= 1.0    Lab Results   Component Value Date    .2 (H) 03/26/2023       Lab Results   Component Value Date    SEDRATE 67 (H) 03/26/2023       Lab Results   Component Value Date    CKTOTAL 32 (L) 03/30/2023       Imaging:   3/26 R knee XRAY:  Patellar fracture of uncertain chronicity. Correlate with history and   physical exam.  Associated joint effusion may suggest that this is subacute   or acute. No complication correlating to knee prosthesis. 3/28 L ankle XRAY:  Traumatic fractures of the distal fibula/lateral malleolus and medial   malleolus. Status post ORIF of the distal fibula. 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

## 2023-04-17 ENCOUNTER — OFFICE VISIT (OUTPATIENT)
Dept: ORTHOPEDIC SURGERY | Age: 78
End: 2023-04-17

## 2023-04-17 VITALS — BODY MASS INDEX: 47.82 KG/M2 | HEIGHT: 65 IN | WEIGHT: 287 LBS

## 2023-04-17 DIAGNOSIS — M25.572 LEFT ANKLE PAIN, UNSPECIFIED CHRONICITY: ICD-10-CM

## 2023-04-17 DIAGNOSIS — M25.562 LEFT KNEE PAIN, UNSPECIFIED CHRONICITY: Primary | ICD-10-CM

## 2023-04-17 LAB
ALBUMIN SERPL-MCNC: ABNORMAL G/DL
ALP BLD-CCNC: ABNORMAL U/L
ALT SERPL-CCNC: ABNORMAL U/L
ANION GAP SERPL CALCULATED.3IONS-SCNC: ABNORMAL MMOL/L
AST SERPL-CCNC: ABNORMAL U/L
BASOPHILS ABSOLUTE: 0.1 /ΜL
BASOPHILS RELATIVE PERCENT: 0.8 %
BILIRUB SERPL-MCNC: ABNORMAL MG/DL
BUN BLDV-MCNC: 22 MG/DL
CALCIUM SERPL-MCNC: 9.2 MG/DL
CHLORIDE BLD-SCNC: 100 MMOL/L
CO2: ABNORMAL
CREAT SERPL-MCNC: 1.3 MG/DL
EGFR: ABNORMAL
EOSINOPHILS ABSOLUTE: 1 /ΜL
EOSINOPHILS RELATIVE PERCENT: 13 %
GLUCOSE BLD-MCNC: 306 MG/DL
HCT VFR BLD CALC: 40 % (ref 41–53)
HEMOGLOBIN: 13.1 G/DL (ref 13.5–17.5)
LYMPHOCYTES ABSOLUTE: 1.7 /ΜL
LYMPHOCYTES RELATIVE PERCENT: 22.6 %
MCH RBC QN AUTO: 30.4 PG
MCHC RBC AUTO-ENTMCNC: 32.8 G/DL
MCV RBC AUTO: 92.5 FL
MONOCYTES ABSOLUTE: 0.6 /ΜL
MONOCYTES RELATIVE PERCENT: 7.7 %
NEUTROPHILS ABSOLUTE: 4.1 /ΜL
NEUTROPHILS RELATIVE PERCENT: 55.6 %
PDW BLD-RTO: 15.4 %
PLATELET # BLD: 174 K/ΜL
PMV BLD AUTO: 8.8 FL
POTASSIUM SERPL-SCNC: 5.3 MMOL/L
RBC # BLD: 4.33 10^6/ΜL
SODIUM BLD-SCNC: 136 MMOL/L
TOTAL PROTEIN: ABNORMAL
WBC # BLD: 7.5 10^3/ML

## 2023-04-17 PROCEDURE — 99024 POSTOP FOLLOW-UP VISIT: CPT | Performed by: ORTHOPAEDIC SURGERY

## 2023-04-18 ENCOUNTER — TELEPHONE (OUTPATIENT)
Dept: INFECTIOUS DISEASES | Age: 78
End: 2023-04-18

## 2023-04-18 NOTE — PROGRESS NOTES
Patient: Jan Burns                  :    MRN: [id number]   Date of Visit: 23     Physician: José Miguel Shultz MD.     Reason for Visit: Status post Right knee explant and insertion of antibiotic spacer, cx positive for MRSA. Subjective History of Present Illness:     Jan Burns is here for regularly scheduled follow-up s/p right knee insertion of static spacer. He is doing well, pain is well controlled. There is no fevers chills drianage. He is WBAT to the left ankle     Physical Examination??: ?   General: Patient is alert and oriented x 3 and appears comfortable. Right knee incision well apporximated, no driange, SILT, moving digits freely     Radiographs: multiple views of the left ankle: there is a distal fibula fracture, there is a distal fibula plate which has not changed in position since the prior xray. There is no evidence of joint subluxation or dislocation. Assessment and Plan?: The patient is progressing well approximately 2.5 weeks s/p R knee insertion of antibiotic spacer, 4 weeks s/p left ankle periprosthetic ankle fracture    1. A thorough discussion was had with the patient concerning the ?postoperative course and the patient is in agreement with the plan. 2. He will continue IV abx for a total of 6 weeks followed by a 2 week drug holiday and repeat ESR/CRP, at that time we will discuss revision and replantation.     _______________________      José Miguel Shultz MD

## 2023-04-18 NOTE — TELEPHONE ENCOUNTER
No answer from nursing or Unit manager  Left message   Dr Burch Meth name, my name, my number, Pt name,   Pt K+ elevated and asking if pt is taking any supplements containing K+

## 2023-04-18 NOTE — TELEPHONE ENCOUNTER
Received a return call from [facility]  Pt is not taking any supplemental K+  It was also noted this weeks K+ is 5.3  These results are being faxed to this office

## 2023-04-18 NOTE — TELEPHONE ENCOUNTER
----- Message from Jean Rojas, [address] sent at 4/12/2023  2:56 PM EDT -----  Note elevated K. Please make sure pt is not taking any supplements at [facility]. Will continue to monitor.

## 2023-04-19 ENCOUNTER — TELEPHONE (OUTPATIENT)
Dept: INFECTIOUS DISEASES | Age: 78
End: 2023-04-19

## 2023-04-19 NOTE — TELEPHONE ENCOUNTER
Attempted to speak to nurse caring for pt  After several transfer and unanswered rings to nurse phone  I left message with   My name, Dr Sam Short name, specialty and affiliation  Pt name   Need for this weeks lab results orders by Dr Charley Hernandez be faxed to this office - [phone]  My number for any further questions  I spoke with nurse yesterday who gave me updated K+ for pt, but she did not fax labs to this office at that time

## 2023-04-20 DIAGNOSIS — T84.59XS CHRONIC INFECTION OF KNEE JOINT PROSTHESIS, SEQUELA: ICD-10-CM

## 2023-04-20 DIAGNOSIS — Z96.659 CHRONIC INFECTION OF KNEE JOINT PROSTHESIS, SEQUELA: ICD-10-CM

## 2023-04-20 NOTE — TELEPHONE ENCOUNTER
Spoke again with Jaya Chu, unit manager at [facility]  She stated after speaking to pt unit manager, pt refused labs yesterday and they were drawn today  I stated I spoke with someone at the facility on Tuesday who gave me an updated K+.   I told Jaya Chu we were concerned with last weeks K+  Eloisa stated a bmp was done on 4/17  I asked for those results to be faxed to this office as well so I have documentation showing the decreased 5.3 K+ that was reported to me on Tuesday  Eloisa agreed to fax those results

## 2023-04-20 NOTE — TELEPHONE ENCOUNTER
As labs have not yet been received as requested x2 days, I attempted to contact facility  [facility] until it went busy. Returned call, spoke with   She tx me to Consuelo, unit manager. She stated \"he not on my unit\"  I stated I have been trying for 3 day to receive these results and she is the first person, aside from the , with whom I have spoken.   Messages have been left and this office needs these results  Consuelo took pt name and our fax number and stated she would see they are faxed

## 2023-04-20 NOTE — RESULT ENCOUNTER NOTE
K improving from last week. Will continue to monitor. SCr trending up but pt fluctuates. Glucose elevated- need tighter control. CRP reassuring. Eosinophils up. Please call and see how pt is doing- any SOB/lung issues? How is R knee?

## 2023-04-21 ENCOUNTER — TELEPHONE (OUTPATIENT)
Dept: INFECTIOUS DISEASES | Age: 78
End: 2023-04-21

## 2023-04-21 NOTE — TELEPHONE ENCOUNTER
----- Message from Daisy Mayorga, [address] [facility] sent at 4/20/2023  3:39 PM EDT -----  K improving from last week. Will continue to monitor. SCr trending up but pt fluctuates. Glucose elevated- need tighter control. CRP reassuring. Eosinophils up. Please call and see how pt is doing- any SOB/lung issues? How is R knee?

## 2023-04-21 NOTE — TELEPHONE ENCOUNTER
Wife called stating she is returning \"the doctors call from yesterday\"  Oj Ellsworth reports pt is in good spirits, no cough, sob or noticeable respiratory issues  Oj Ellsworth also states pt has no pain in the R knee and PT and the nurse are happy with how he is healing\"  Oj Ellsworth says pt is handling the atb \"very well\"

## 2023-04-24 ENCOUNTER — TELEPHONE (OUTPATIENT)
Dept: INFECTIOUS DISEASES | Age: 78
End: 2023-04-24

## 2023-04-24 LAB
BASOPHILS ABSOLUTE: ABNORMAL
BASOPHILS RELATIVE PERCENT: ABNORMAL
EOSINOPHILS ABSOLUTE: ABNORMAL
EOSINOPHILS RELATIVE PERCENT: ABNORMAL
HCT VFR BLD CALC: 41.4 % (ref 41–53)
HEMOGLOBIN: 13.8 G/DL (ref 13.5–17.5)
LYMPHOCYTES ABSOLUTE: ABNORMAL
LYMPHOCYTES RELATIVE PERCENT: ABNORMAL
MCH RBC QN AUTO: 31.4 PG
MCHC RBC AUTO-ENTMCNC: 33.4 G/DL
MCV RBC AUTO: 93.9 FL
MONOCYTES ABSOLUTE: ABNORMAL
MONOCYTES RELATIVE PERCENT: ABNORMAL
NEUTROPHILS ABSOLUTE: ABNORMAL
NEUTROPHILS RELATIVE PERCENT: ABNORMAL
PDW BLD-RTO: 16 %
PLATELET # BLD: 215 K/ΜL
PMV BLD AUTO: 8.5 FL
RBC # BLD: 4.4 10^6/ΜL
WBC # BLD: 8.7 10^3/ML

## 2023-04-24 NOTE — TELEPHONE ENCOUNTER
Wife left message for Dr Chandana Zacarias on Sunday ststing pt very congested and didny sleep well  Also need Thursdays lab results faxed to this office    Unable to speak to nurse or ADON at this time dt med pass   Will attempt call later this am

## 2023-04-24 NOTE — TELEPHONE ENCOUNTER
Spoke with reception at [facility]  The transferred me to [employer] who stated, \"I don't have a Mr Anca Holloway, let me tx you back to reception\"   took my information and direct number and stated she would, \"walk this down to his nurse and hand this note to her directly\"

## 2023-04-24 NOTE — TELEPHONE ENCOUNTER
2nd attempt to contact nurse caring for patient - no answer  Attempted ADON and DON - left detailed message on VM  My name, Dr Angeles Johnson name, pt name and   Wife called us over weekend regarding pt not being able to sleep and being congested  Has nurse addressed this?  Also, still awaiting lab results from  to be faxed to this office - left office fax number and my direct line

## 2023-04-24 NOTE — TELEPHONE ENCOUNTER
Anticoagulation Summary  As of 2019    INR goal:   2.0-3.0   TTR:   50.8 % (10.2 mo)   INR used for dosin.70! (2019)   Warfarin maintenance plan:   1 mg (2 mg x 0.5) every Mon, Fri; 2 mg (2 mg x 1) all other days   Weekly warfarin total:   12 mg   Plan last modified:   Juan NairD (2019)   Next INR check:   2019   Target end date:   Indefinite    Indications    Stroke (HCC) [I63.9]  Atrial flutter (HCC) [I48.92]             Anticoagulation Episode Summary     INR check location:       Preferred lab:       Send INR reminders to:       Comments:         Anticoagulation Care Providers     Provider Role Specialty Phone number    Hussain Nation M.D. Referring Interventional Cardiology [phone]    [facility] Responsible  [phone]        Anticoagulation Patient Findings      HPI:  Az Jolly seen in clinic today, on anticoagulation therapy with warfarin for Stroke.   Changes to current medical/health status since last appt: none  Denies signs/symptoms of bleeding and/or thrombosis since the last appt.    Pt reports she has been drinking V8 juice daily and would like to continue with it.   Denies any interval changes to medications since last appt.   Denies any complications or cost restrictions with current therapy.   BP recorded in vitals.  Confirmed dosing regimen.     A/P   INR  SUB-therapeutic.   Begin increased regimen based on diet changes.     Follow up appointment in 1.5 week, this is the earliest she could return.  Based on return appointment, suggest against bridging due to the duration of follow up.     Graham Jackson, PharmD    Received call from DANIELLA Barrett [facility])  Pt has CXR done and she is faxing results to this office  She noted the elevated eosinophils - I notified her we were aware of the elevation and had spoke with the wife last week and that the wife had then left a message for Dr Fuentes Officer over the weekend - pt co congestion  Her, MD maryan would like to place pt on 40mg Prednison x 5 days and repeat CXR  Bandar Chew is also ordering breathing treatments for pt as well    After this call ended received call from nurse caring for pt  She stated pt refused labs again on 4/20

## 2023-04-25 LAB
ALBUMIN SERPL-MCNC: 3.1 G/DL
ALP BLD-CCNC: 223 U/L
ALT SERPL-CCNC: 23 U/L
ANION GAP SERPL CALCULATED.3IONS-SCNC: ABNORMAL MMOL/L
AST SERPL-CCNC: 18 U/L
BILIRUB SERPL-MCNC: 0.6 MG/DL (ref 0.1–1.4)
BUN BLDV-MCNC: 20 MG/DL
C-REACTIVE PROTEIN: 129.9
CALCIUM SERPL-MCNC: 9.6 MG/DL
CHLORIDE BLD-SCNC: 98 MMOL/L
CO2: 29 MMOL/L
CREAT SERPL-MCNC: 1.3 MG/DL
EGFR: ABNORMAL
GLUCOSE BLD-MCNC: 138 MG/DL
POTASSIUM SERPL-SCNC: 6.2 MMOL/L
SEDIMENTATION RATE, ERYTHROCYTE: 44
SODIUM BLD-SCNC: 134 MMOL/L
TOTAL CK: 36 U/L
TOTAL PROTEIN: 6.3

## 2023-04-25 NOTE — TELEPHONE ENCOUNTER
Lots of confusion on this case-  I noticed eos were up last week and pt is on dapto so I just wanted to make sure he wasn't having any SOB or concerns related to dapto induced eosinophilic PNA. Called wife-  She states pt didn't sleep Sat PM because he was coughing/congested. No overt SOB or expectorant. She thought to call this office back because she had remembered someone asking about his lungs last week. CXR:      No infectious concerns. Kike MD is manage congestion however he/she sees fit. No abx needed or change from our office. Wife verbalized understanding of the situation. Feels bad for \"bothering our office. \" Educated that everything was good from our end. We are only managing the knee and any untoward effects from dapto. R knee-   \"Doing fine. \" No pain. Saw it last week when he got bandage off- MD states it looks good.      William Wing, PharmD, [address], [place] Infectious Disease  Phone: [phone] (also available on [employer])  Fax: [phone]    For Pharmacy Admin Tracking Only    Program: Medical Group  CPA in place: Yes  Time Spent (min): 45

## 2023-04-27 ENCOUNTER — TELEPHONE (OUTPATIENT)
Dept: INFECTIOUS DISEASES | Age: 78
End: 2023-04-27

## 2023-04-27 DIAGNOSIS — Z96.659 CHRONIC INFECTION OF KNEE JOINT PROSTHESIS, SEQUELA: ICD-10-CM

## 2023-04-27 DIAGNOSIS — T84.59XS CHRONIC INFECTION OF KNEE JOINT PROSTHESIS, SEQUELA: ICD-10-CM

## 2023-04-27 NOTE — RESULT ENCOUNTER NOTE
ROSCOE Zapata is inquiring about ?hemolysis vs hyperkalemia management by house MD.  Inflammatory markers have gone up but does not match clinical progression- will continue to monitor.

## 2023-04-27 NOTE — TELEPHONE ENCOUNTER
Vaishali Wick, spoke with nurse Jazlyn Villegas (O-knee)  Asked if anyone had addressed the 6.2 K+ reported on 4/25 labs  The day before, 4/24 the K+ was 5.2  All other results in the CMP were unchanged from the 4/24 draw  Jazlyn Villegas will find out if this level has been addressed by house MD/NP and call me back on my direct line  Abstracting labs now    Also, no diff done on CBC to monitor Eosinophils trend

## 2023-05-02 LAB
ALBUMIN SERPL-MCNC: 3.1 G/DL
ALP BLD-CCNC: 149 U/L
ALT SERPL-CCNC: 29 U/L
ANION GAP SERPL CALCULATED.3IONS-SCNC: ABNORMAL MMOL/L
AST SERPL-CCNC: 15 U/L
BASOPHILS ABSOLUTE: 0.1 /ΜL
BASOPHILS RELATIVE PERCENT: 0.6 %
BILIRUB SERPL-MCNC: 0.5 MG/DL (ref 0.1–1.4)
BUN BLDV-MCNC: 27 MG/DL
C-REACTIVE PROTEIN: 8
CALCIUM SERPL-MCNC: 8.9 MG/DL
CHLORIDE BLD-SCNC: 97 MMOL/L
CO2: 27 MMOL/L
CREAT SERPL-MCNC: 1.1 MG/DL
EGFR: ABNORMAL
EOSINOPHILS ABSOLUTE: 1 /ΜL
EOSINOPHILS RELATIVE PERCENT: 9.2 %
GLUCOSE BLD-MCNC: 209 MG/DL
HCT VFR BLD CALC: 41.8 % (ref 41–53)
HEMOGLOBIN: 13.5 G/DL (ref 13.5–17.5)
LYMPHOCYTES ABSOLUTE: 2.1 /ΜL
LYMPHOCYTES RELATIVE PERCENT: 18.3 %
MCH RBC QN AUTO: 29.9 PG
MCHC RBC AUTO-ENTMCNC: 32.3 G/DL
MCV RBC AUTO: 92.8 FL
MONOCYTES ABSOLUTE: 0.7 /ΜL
MONOCYTES RELATIVE PERCENT: 6.5 %
NEUTROPHILS ABSOLUTE: 7.4 /ΜL
NEUTROPHILS RELATIVE PERCENT: 65.4 %
PDW BLD-RTO: 15.1 %
PLATELET # BLD: 212 K/ΜL
PMV BLD AUTO: 8.2 FL
POTASSIUM SERPL-SCNC: 4.4 MMOL/L
RBC # BLD: 4.51 10^6/ΜL
SEDIMENTATION RATE, ERYTHROCYTE: 14
SODIUM BLD-SCNC: 134 MMOL/L
TOTAL CK: 44 U/L
TOTAL PROTEIN: 5.6
WBC # BLD: 11.3 10^3/ML

## 2023-05-03 DIAGNOSIS — T84.59XS CHRONIC INFECTION OF KNEE JOINT PROSTHESIS, SEQUELA: ICD-10-CM

## 2023-05-03 DIAGNOSIS — Z96.659 CHRONIC INFECTION OF KNEE JOINT PROSTHESIS, SEQUELA: ICD-10-CM

## 2023-05-04 ENCOUNTER — TELEPHONE (OUTPATIENT)
Dept: ORTHOPEDIC SURGERY | Age: 78
End: 2023-05-04

## 2023-05-04 NOTE — TELEPHONE ENCOUNTER
I spoke with the pt' wife Parul Bowden) stating that the SNIFF where the pt is is telling her that she will have to take him home tomorrow or star paying out of pocket.  Jewels Sow told me that this came from her insurance    I suggested for her to call the insurance company to find out what is going on, ion their end as to why they won't be covering his stay after today    Pt will call me back once she speaks with the insurance company

## 2023-05-04 NOTE — TELEPHONE ENCOUNTER
I spoke with the pt's wife, she stated that her insurance company said they need to hear from the Dr that the pt needs more time at the [facility]. I told her that that decision comes from the Dr.'s at the [facility] and that they will have to speak to the insurance company.     She understood and will have the SNIFF coordinate with the insurance

## 2023-05-08 ENCOUNTER — TELEPHONE (OUTPATIENT)
Dept: INFECTIOUS DISEASES | Age: 78
End: 2023-05-08

## 2023-05-08 NOTE — TELEPHONE ENCOUNTER
Pt due to end 6 weeks of IV dapto on 5/9 for MRSA bacteremia 2/2 R knee PJI s/p stage 1 surgery 3/28. Labs have been stable. Inflammatory markers are much improved and WNL. Spoke with wife last week and ortho is pleased with progress thus far. No concerns with knee. Okay to end OPAT as planned and have patient follow closely with ortho (Dr. Shameka Zheng).     Steven Almonte, PharmD, [facility] [place], [place] Infectious Disease  Phone: [phone] (also available on [employer])  Fax: [phone]    For Pharmacy [address] [place] [address] Only    Program: [id number] [place] [address] in place: Yes  Intervention Detail: Discontinued Rx: 1, reason: Therapy Complete  Time Spent (min): 10

## 2023-05-08 NOTE — TELEPHONE ENCOUNTER
Spoke with Caitlin Wright at [facility]  I gave verbal orders to stop IV ATB after tomorrows doses and pull PICC  Zulema verbalized understanding

## 2023-05-12 ENCOUNTER — HOSPITAL ENCOUNTER (EMERGENCY)
Age: 78
Discharge: HOME | End: 2023-05-12
Payer: MEDICARE

## 2023-05-12 VITALS
DIASTOLIC BLOOD PRESSURE: 69 MMHG | WEIGHT: 240 LBS | SYSTOLIC BLOOD PRESSURE: 108 MMHG | OXYGEN SATURATION: 94 % | BODY MASS INDEX: 39.99 KG/M2 | HEART RATE: 89 BPM | HEIGHT: 65 IN | RESPIRATION RATE: 16 BRPM | TEMPERATURE: 98.5 F

## 2023-05-12 DIAGNOSIS — Z45.2 PIC LINE (PERIPHERALLY INSERTED CENTRAL CATHETER) REMOVAL: Primary | ICD-10-CM

## 2023-05-12 PROCEDURE — 99283 EMERGENCY DEPT VISIT LOW MDM: CPT

## 2023-05-12 ASSESSMENT — ENCOUNTER SYMPTOMS
VOMITING: 0
ABDOMINAL PAIN: 0
SHORTNESS OF BREATH: 0
DIARRHEA: 0
CHEST TIGHTNESS: 0
COLOR CHANGE: 0
RESPIRATORY NEGATIVE: 1
BACK PAIN: 0
CONSTIPATION: 0
NAUSEA: 0
COUGH: 0

## 2023-05-12 ASSESSMENT — LIFESTYLE VARIABLES
HOW MANY STANDARD DRINKS CONTAINING ALCOHOL DO YOU HAVE ON A TYPICAL DAY: PATIENT DOES NOT DRINK
HOW OFTEN DO YOU HAVE A DRINK CONTAINING ALCOHOL: NEVER

## 2023-05-12 ASSESSMENT — PAIN - FUNCTIONAL ASSESSMENT: PAIN_FUNCTIONAL_ASSESSMENT: NONE - DENIES PAIN

## 2023-05-12 NOTE — ED NOTES
Lisandro ANGULO at bedside. PICC removed per [facility]. PICC tip visualized and line intact. Patient tolerated well.       Kenna Bose RN  05/12/23 Jeff Irizarry RN  05/12/23 1910

## 2023-05-12 NOTE — ED PROVIDER NOTES
[facility]        Pt Name: Nathalia Painter  MRN: [id number]  [place] 1945  Date of evaluation: 5/12/2023  Provider: KEV Pittman  PCP: Dania Galeana MD  Note Started: 7:35 PM EDT 5/12/23      GEORGINA. I have evaluated this patient. My supervising physician was available for consultation. CHIEF COMPLAINT       Chief Complaint   Patient presents with    Vascular Access Problem     According to patient, \"I was getting antibiotics through my right chest port at [address] [facility] and those were finished on 5/9. The nurse went to take out the PICC today and could not get it out so she got scared and called EMS to bring me to the ED.'       HISTORY OF PRESENT ILLNESS: 1 or more Elements     History From: Patient  Limitations to history : None    Nathalia Painter is a 76 y.o. male with past medical history of diabetes, CAD, hypertension, obesity, obstructive sleep apnea and bacteremia just recently finished antibiotics who presents ED with complaint of a vascular access problem. Patient states he was receiving antibiotics through a central catheter to his right-sided chest at [facility]. He states he started the antibiotics in the end of March. He reports he just recently completed his antibiotics on 5/9/2023. He denies any complaints. Was post to have the catheter removed. They attempted to remove it at over 1 but unable to remove so sent to the ED for further evaluation and treatment. He denies any fever or chills. He states he feels fine. No chest pain, shortness of breath, rashes/lesions or other complaints. States he is just here so that he get the catheter removed. Nursing Notes were all reviewed and agreed with or any disagreements were addressed in the HPI. REVIEW OF SYSTEMS :      Review of Systems   Constitutional:  Negative for activity change, appetite change, chills, diaphoresis, fatigue and fever.

## 2023-05-13 NOTE — ED NOTES
Attempted to call report to [facility], no answer.       [facility], RN  05/12/23 4315

## 2023-05-16 ENCOUNTER — TELEPHONE (OUTPATIENT)
Dept: ORTHOPEDIC SURGERY | Age: 78
End: 2023-05-16

## 2023-05-16 NOTE — TELEPHONE ENCOUNTER
I spoke with the pt's wife Luna Bonilla).  We set up an appt for the pt to come in on 5/25/23 at the [facility] office for a wound and next steps     Milka Spears will call the SNIFF and let them know of the change in the pt's appt

## 2023-05-19 ENCOUNTER — TELEPHONE (OUTPATIENT)
Dept: ORTHOPEDIC SURGERY | Age: 78
End: 2023-05-19

## 2023-05-19 NOTE — TELEPHONE ENCOUNTER
Called and left a VM for the pt's wife.  I asked her to please call make with the number to the SNIFF the pt is at so I can call them back to reschedule the pts appt

## 2023-05-22 ENCOUNTER — TELEPHONE (OUTPATIENT)
Dept: ORTHOPEDIC SURGERY | Age: 78
End: 2023-05-22

## 2023-05-22 NOTE — TELEPHONE ENCOUNTER
General Question     Subject: RIGHT KNEE   Patient and /or Facility Request: Tristen Cruz  Contact Number: [phone]    PATIENTS WIFE CALLING REQUESTING THAT SOMEONE IN DOCTOR ADAN'S OFFICE GIVE THE NURSING HOME THAT HER  IS IN TO RESCHEDULE HIS POST-OP APPOINTMENT DUE TO THE TRANSPORTATION    I TRIED TO SCHEDULE THE PATIENT'S APPOINTMENT BUT, SHE STATED THAT THE OFFICE NEEDED TO SCHEDULE THE APPOINTMENT WITH THE NURSING HOME DIRECTLY     LAURA RODRÍGUEZ [phone]    PLEASE CALL THE NUMBER ABOVE

## 2023-06-01 ENCOUNTER — TELEPHONE (OUTPATIENT)
Dept: ORTHOPEDIC SURGERY | Age: 78
End: 2023-06-01

## 2023-06-01 NOTE — TELEPHONE ENCOUNTER
I left a VM with Amaury at [email or web address] giving her the call center number to call and reschedule the pts appt

## 2023-06-05 ENCOUNTER — OFFICE VISIT (OUTPATIENT)
Dept: ORTHOPEDIC SURGERY | Age: 78
End: 2023-06-05

## 2023-06-05 ENCOUNTER — HOSPITAL ENCOUNTER (OUTPATIENT)
Age: 78
Discharge: HOME | End: 2023-06-05
Payer: MEDICARE

## 2023-06-05 ENCOUNTER — TELEPHONE (OUTPATIENT)
Dept: ORTHOPEDIC SURGERY | Age: 78
End: 2023-06-05

## 2023-06-05 VITALS — BODY MASS INDEX: 39.99 KG/M2 | WEIGHT: 240 LBS | HEIGHT: 65 IN

## 2023-06-05 DIAGNOSIS — Z98.890 S/P RIGHT KNEE SURGERY: ICD-10-CM

## 2023-06-05 DIAGNOSIS — Z98.890 S/P RIGHT KNEE SURGERY: Primary | ICD-10-CM

## 2023-06-05 LAB
CRP SERPL-MCNC: <3 MG/L (ref 0–5.1)
ERYTHROCYTE [SEDIMENTATION RATE] IN BLOOD BY WESTERGREN METHOD: 13 MM/HR (ref 0–20)

## 2023-06-05 PROCEDURE — 99024 POSTOP FOLLOW-UP VISIT: CPT | Performed by: ORTHOPAEDIC SURGERY

## 2023-06-05 PROCEDURE — 86140 C-REACTIVE PROTEIN: CPT

## 2023-06-05 PROCEDURE — 85652 RBC SED RATE AUTOMATED: CPT

## 2023-06-05 PROCEDURE — 36415 COLL VENOUS BLD VENIPUNCTURE: CPT

## 2023-06-05 NOTE — PROGRESS NOTES
Patient: Marc Ness                  :    MRN: [id number]   Date of Visit: 23     Physician: Wagner Trimble MD.     Reason for Visit: Status post Right knee explant and insertion of antibiotic spacer, cx positive for MRSA. Subjective History of Present Illness:     Marc Ness is here for regularly scheduled follow-up s/p right knee insertion of static spacer. He is doing well, pain is well controlled. There is no fevers chills drianage. He is WBAT to the left ankle     There was a scab over the anterior knee that was picked up by one of the nurses and there is a small area that is now healing well at this time. Physical Examination??: ?   General: Patient is alert and oriented x 3 and appears comfortable. Right knee incision well apporximated, no driange, SILT, moving digits freely     Radiographs:no new     Assessment and Plan?: The patient is progressing well approximately 8 weeks s/p R knee insertion of antibiotic spacer    1. He does have a small area to the anterior knee that is not fully closed I am concerned about this area we will start him on wet-to-dry dressing changes. 2.  Additionally I will send her for a repeat ESR and CRP to determine whether he is cleared the infection or not.    3.  I will see him back in 2 weeks at which time we will reevaluate the anterior knee is clear the infection in the anterior knee was better proceed with replantation    _______________________      Wagner Trimble MD

## 2023-06-20 ENCOUNTER — TELEPHONE (OUTPATIENT)
Dept: ORTHOPEDIC SURGERY | Age: 78
End: 2023-06-20

## 2023-06-20 ENCOUNTER — OFFICE VISIT (OUTPATIENT)
Dept: ORTHOPEDIC SURGERY | Age: 78
End: 2023-06-20

## 2023-06-20 VITALS — HEIGHT: 65 IN | WEIGHT: 240 LBS | BODY MASS INDEX: 39.99 KG/M2

## 2023-06-20 DIAGNOSIS — T84.59XD CHRONIC INFECTION OF KNEE JOINT PROSTHESIS, SUBSEQUENT ENCOUNTER: Primary | ICD-10-CM

## 2023-06-20 DIAGNOSIS — Z96.659 CHRONIC INFECTION OF KNEE JOINT PROSTHESIS, SUBSEQUENT ENCOUNTER: Primary | ICD-10-CM

## 2023-06-20 DIAGNOSIS — Z98.890 S/P RIGHT KNEE SURGERY: ICD-10-CM

## 2023-06-20 PROCEDURE — 99024 POSTOP FOLLOW-UP VISIT: CPT | Performed by: ORTHOPAEDIC SURGERY

## 2023-06-20 NOTE — PROGRESS NOTES
Patient: Jules Brock                  :    MRN: [id number]   Date of Visit: 23     Physician: Andreina Henao MD.     Reason for Visit: Status post Right knee explant and insertion of antibiotic spacer, cx positive for MRSA. Subjective History of Present Illness:     Jules Brock is here for regularly scheduled follow-up s/p right knee insertion of static spacer. He is doing well, pain is well controlled. There is no fevers chills drianage. He is WBAT to the left ankle     There was a scab over the anterior knee that has resolved at this point. His recent ESR/CRP were WNL. Physical Examination??: ?   General: Patient is alert and oriented x 3 and appears comfortable. Right knee incision well apporximated, no driange, SILT, moving digits freely     Radiographs:no new     Assessment and Plan?: The patient is progressing well approximately 10 weeks s/p R knee insertion of antibiotic spacer    1. His ESR/CRP are reassuring off antibiotics, his anterior knee skin is healed  2. At this point I think he is ready for second stage. I explained the nature of the surgery that would include EM reconstruction and prolonged immobilzation~ 3mo    We discussed the surgical procedure, recovery period, and protocol for pain management, expected post-operative course in detail. We discussed the potential benefits of the surgery including pain relief, improved mobility, as well as the inherent risks including but are not limited to infection,  hardware failure, nerve or vascular injury, need for further surgery, deep vein thrombus, pulmonary embolism. The patient has verbalized understanding of these risks and wishes to proceed.       _______________________      Andreina Henao MD

## 2023-06-28 RX ORDER — BISACODYL 10 MG
10 SUPPOSITORY, RECTAL RECTAL DAILY
COMMUNITY

## 2023-06-28 RX ORDER — DOCUSATE SODIUM 100 MG/1
100 CAPSULE, LIQUID FILLED ORAL DAILY
COMMUNITY

## 2023-06-28 RX ORDER — IPRATROPIUM BROMIDE AND ALBUTEROL SULFATE 2.5; .5 MG/3ML; MG/3ML
1 SOLUTION RESPIRATORY (INHALATION) EVERY 4 HOURS
COMMUNITY

## 2023-06-28 RX ORDER — ONDANSETRON 4 MG/1
4 TABLET, FILM COATED ORAL EVERY 8 HOURS PRN
COMMUNITY

## 2023-06-28 RX ORDER — PANTOPRAZOLE SODIUM 20 MG/1
20 TABLET, DELAYED RELEASE ORAL DAILY
COMMUNITY

## 2023-06-28 RX ORDER — IBUPROFEN 200 MG
1 CAPSULE ORAL DAILY
COMMUNITY

## 2023-06-28 RX ORDER — SENNOSIDES A AND B 8.6 MG/1
1 TABLET, FILM COATED ORAL DAILY
COMMUNITY

## 2023-06-28 RX ORDER — POLYETHYLENE GLYCOL 3350 17 G/17G
17 POWDER, FOR SOLUTION ORAL DAILY PRN
COMMUNITY

## 2023-06-28 RX ORDER — OXYCODONE HYDROCHLORIDE 5 MG/1
5 CAPSULE ORAL EVERY 4 HOURS PRN
Status: ON HOLD | COMMUNITY
End: 2023-07-13 | Stop reason: HOSPADM

## 2023-06-28 RX ORDER — GUAIFENESIN 100 MG/5ML
200 SOLUTION ORAL EVERY 4 HOURS PRN
COMMUNITY

## 2023-06-28 NOTE — PROGRESS NOTES
Had no call back from [facility] yesterday -Called this AM spoke to Lawanda-information needed all faxed to number provided

## 2023-06-28 NOTE — PROGRESS NOTES
Name_______________________________________Printed:____________________  Date and time of surgery__7/12  1200______________________Arrival Time:__1000 MAIN ENTRANCE 3000______________   1. The instructions given regarding when and if a patient needs to stop oral intake prior to surgery varies. Follow the specific instructions you were given                  __x_Nothing to eat or to drink after Midnight the night before.                   ____Carbo loading or instructions will be given to select patients-if you have been given those instructions -please do the following                           The evening before your surgery after dinner before midnight drink 40 ounces of gatorade. If you are diabetic use sugar free. The morning of surgery drink 40 ounces of water. This needs to be finished 3 hours prior to your surgery start time. 2. Take the following pills with a small sip of water on the morning of surgery__thyroid ,inhaler as needed,metoprolol________________________________________________                  Do not take blood pressure medications ending in pril or sartan the nai prior to surgery or the morning of surgery. Dr Ying Smith patient are not to take any medications the AM of surgery. 3. Aspirin, Ibuprofen, Advil, Naproxen, Vitamin E and other Anti-inflammatory products and supplements should be stopped for 5 -7days before surgery or as directed by your physician. 4. Check with your Doctor regarding stopping Plavix, Coumadin,Eliquis, Lovenox,Effient,Pradaxa,Xarelto, Fragmin or other blood thinners and follow their instructions. MAKE SURE YOU FOLLOW INSTRUCTIONS ON PLAVIX FROM DR Jose Raul Clifford   5. Do not smoke, and do not drink any alcoholic beverages 24 hours prior to surgery. This includes NA Beer. Refrain from the usage of any recreational drugs. 6. You may brush your teeth and gargle the morning of surgery. DO NOT SWALLOW WATER   7.  You MUST make arrangements for a responsible adult to stay on

## 2023-07-07 NOTE — PROGRESS NOTES
Lalit Zhang, at [facility] will send ekg if they are able to complete.

## 2023-07-07 NOTE — DISCHARGE INSTR - COC
Continuity of Care Form    Patient Name: Grant De La Torre   :  1945  MRN:  [id number]    Admit date:  (Not on file)  Discharge date:  ***    Code Status Order: Prior   Advance Directives:     Admitting Physician:  Vincent Fink MD  PCP: Nurys Ramos MD    Discharging Nurse: [facility] Unit/Room#: No information available for this encounter. Discharging Unit Phone Number: ***    Emergency Contact:   Extended Emergency Contact Information  Primary Emergency Contact: Haylee Phone: [phone]  Relation: Spouse    Past Surgical History:  Past Surgical History:   Procedure Laterality Date    CARDIAC CATHETERIZATION      had coronary stenting in     [address] Right     REVISION TOTAL KNEE ARTHROPLASTY Right     had infected R knee-had spacer and subsequent revision in     [address] Right 3/28/2023    RIGHT KNEE HARDWARE REMOVAL, PLACEMENT OF ANTIBIOTIC SPACER performed by Vincent Fink MD at [facility]       Immunization History: There is no immunization history on file for this patient.     Active Problems:  Patient Active Problem List   Diagnosis Code    Chronic infection of prosthetic knee (HCC) T84.59XA, Z96.659    Type 2 diabetes mellitus, with long-term current use of insulin ([address]) E11.9, Z79.4    CAD (coronary artery disease) I25.10    Primary hypertension I10    Morbid obesity ([address]) E66.01    Hypothyroid E03.9    SANTI on CPAP G47.33, Z99.89    Closed left ankle fracture S82.892A    Staphylococcus aureus infection A49.01    MRSA bacteremia R78.81, B95.62    Infection of total right knee replacement ([address]) T84.53XA       Isolation/Infection:   Isolation            No Isolation          Patient Infection Status       Infection Onset Added Last Indicated Last Indicated By Review Planned Expiration Resolved Resolved By    MRSA 23 Culture, Tissue                Nurse Assessment:  Last Vital Signs:

## 2023-07-10 ENCOUNTER — TELEPHONE (OUTPATIENT)
Dept: ORTHOPEDIC SURGERY | Age: 78
End: 2023-07-10

## 2023-07-12 ENCOUNTER — APPOINTMENT (OUTPATIENT)
Dept: GENERAL RADIOLOGY | Age: 78
DRG: 941 | End: 2023-07-12
Attending: ORTHOPAEDIC SURGERY
Payer: MEDICARE

## 2023-07-12 ENCOUNTER — ANESTHESIA (OUTPATIENT)
Dept: OPERATING ROOM | Age: 78
End: 2023-07-12
Payer: MEDICARE

## 2023-07-12 ENCOUNTER — HOSPITAL ENCOUNTER (INPATIENT)
Age: 78
LOS: 5 days | Discharge: NURSING, REHAB OR LONG-TERM CARE | DRG: 941 | End: 2023-07-17
Attending: ORTHOPAEDIC SURGERY | Admitting: ORTHOPAEDIC SURGERY
Payer: MEDICARE

## 2023-07-12 ENCOUNTER — ANESTHESIA EVENT (OUTPATIENT)
Dept: OPERATING ROOM | Age: 78
End: 2023-07-12
Payer: MEDICARE

## 2023-07-12 DIAGNOSIS — Z96.659 CHRONIC INFECTION OF PROSTHETIC KNEE, INITIAL ENCOUNTER (HCC): Primary | ICD-10-CM

## 2023-07-12 DIAGNOSIS — T84.59XA CHRONIC INFECTION OF PROSTHETIC KNEE, INITIAL ENCOUNTER (HCC): Primary | ICD-10-CM

## 2023-07-12 LAB
ABO + RH BLD: NORMAL
BLD GP AB SCN SERPL QL: NORMAL
GLUCOSE BLD-MCNC: 162 MG/DL (ref 70–99)
GLUCOSE BLD-MCNC: 194 MG/DL (ref 70–99)
GLUCOSE BLD-MCNC: 243 MG/DL (ref 70–99)
PERFORMED ON: ABNORMAL

## 2023-07-12 PROCEDURE — 3700000000 HC ANESTHESIA ATTENDED CARE: Performed by: ORTHOPAEDIC SURGERY

## 2023-07-12 PROCEDURE — 6360000002 HC RX W HCPCS

## 2023-07-12 PROCEDURE — C9399 UNCLASSIFIED DRUGS OR BIOLOG: HCPCS | Performed by: NURSE ANESTHETIST, CERTIFIED REGISTERED

## 2023-07-12 PROCEDURE — 2580000003 HC RX 258: Performed by: ORTHOPAEDIC SURGERY

## 2023-07-12 PROCEDURE — 0SRC0J9 REPLACEMENT OF RIGHT KNEE JOINT WITH SYNTHETIC SUBSTITUTE, CEMENTED, OPEN APPROACH: ICD-10-PCS | Performed by: ORTHOPAEDIC SURGERY

## 2023-07-12 PROCEDURE — 2500000003 HC RX 250 WO HCPCS: Performed by: ORTHOPAEDIC SURGERY

## 2023-07-12 PROCEDURE — 73560 X-RAY EXAM OF KNEE 1 OR 2: CPT

## 2023-07-12 PROCEDURE — 7100000001 HC PACU RECOVERY - ADDTL 15 MIN: Performed by: ORTHOPAEDIC SURGERY

## 2023-07-12 PROCEDURE — 2709999900 HC NON-CHARGEABLE SUPPLY: Performed by: ORTHOPAEDIC SURGERY

## 2023-07-12 PROCEDURE — 6370000000 HC RX 637 (ALT 250 FOR IP): Performed by: ORTHOPAEDIC SURGERY

## 2023-07-12 PROCEDURE — 6360000002 HC RX W HCPCS: Performed by: NURSE ANESTHETIST, CERTIFIED REGISTERED

## 2023-07-12 PROCEDURE — 27487 REVISE/REPLACE KNEE JOINT: CPT | Performed by: ORTHOPAEDIC SURGERY

## 2023-07-12 PROCEDURE — 2500000003 HC RX 250 WO HCPCS

## 2023-07-12 PROCEDURE — 6360000002 HC RX W HCPCS: Performed by: ANESTHESIOLOGY

## 2023-07-12 PROCEDURE — 6360000002 HC RX W HCPCS: Performed by: ORTHOPAEDIC SURGERY

## 2023-07-12 PROCEDURE — C1781 MESH (IMPLANTABLE): HCPCS | Performed by: ORTHOPAEDIC SURGERY

## 2023-07-12 PROCEDURE — 3700000001 HC ADD 15 MINUTES (ANESTHESIA): Performed by: ORTHOPAEDIC SURGERY

## 2023-07-12 PROCEDURE — 2720000010 HC SURG SUPPLY STERILE: Performed by: ORTHOPAEDIC SURGERY

## 2023-07-12 PROCEDURE — 73590 X-RAY EXAM OF LOWER LEG: CPT

## 2023-07-12 PROCEDURE — C1776 JOINT DEVICE (IMPLANTABLE): HCPCS | Performed by: ORTHOPAEDIC SURGERY

## 2023-07-12 PROCEDURE — C1713 ANCHOR/SCREW BN/BN,TIS/BN: HCPCS | Performed by: ORTHOPAEDIC SURGERY

## 2023-07-12 PROCEDURE — 3600000015 HC SURGERY LEVEL 5 ADDTL 15MIN: Performed by: ORTHOPAEDIC SURGERY

## 2023-07-12 PROCEDURE — 1200000000 HC SEMI PRIVATE

## 2023-07-12 PROCEDURE — 86901 BLOOD TYPING SEROLOGIC RH(D): CPT

## 2023-07-12 PROCEDURE — 2500000003 HC RX 250 WO HCPCS: Performed by: NURSE ANESTHETIST, CERTIFIED REGISTERED

## 2023-07-12 PROCEDURE — C1729 CATH, DRAINAGE: HCPCS | Performed by: ORTHOPAEDIC SURGERY

## 2023-07-12 PROCEDURE — 0SPC0JZ REMOVAL OF SYNTHETIC SUBSTITUTE FROM RIGHT KNEE JOINT, OPEN APPROACH: ICD-10-PCS | Performed by: ORTHOPAEDIC SURGERY

## 2023-07-12 PROCEDURE — 86900 BLOOD TYPING SEROLOGIC ABO: CPT

## 2023-07-12 PROCEDURE — 7100000000 HC PACU RECOVERY - FIRST 15 MIN: Performed by: ORTHOPAEDIC SURGERY

## 2023-07-12 PROCEDURE — 3600000005 HC SURGERY LEVEL 5 BASE: Performed by: ORTHOPAEDIC SURGERY

## 2023-07-12 PROCEDURE — 86850 RBC ANTIBODY SCREEN: CPT

## 2023-07-12 DEVICE — IMPLANTABLE DEVICE
Type: IMPLANTABLE DEVICE | Site: KNEE | Status: FUNCTIONAL
Brand: PERSONA® TRABECULAR METAL®

## 2023-07-12 DEVICE — BARD MESH, 10" X 14" (26 CM X 36 CM)
Type: IMPLANTABLE DEVICE | Site: KNEE | Status: FUNCTIONAL
Brand: BARD

## 2023-07-12 DEVICE — IMPLANTABLE DEVICE
Type: IMPLANTABLE DEVICE | Site: KNEE | Status: FUNCTIONAL
Brand: NEXGEN®

## 2023-07-12 DEVICE — CEMENT BIOPREP ST: Type: IMPLANTABLE DEVICE | Site: KNEE | Status: FUNCTIONAL

## 2023-07-12 DEVICE — GRAFT BNE PTTY 10 CC INJ CEM-OSTETIC: Type: IMPLANTABLE DEVICE | Site: KNEE | Status: FUNCTIONAL

## 2023-07-12 DEVICE — IMPLANTABLE DEVICE
Type: IMPLANTABLE DEVICE | Site: KNEE | Status: FUNCTIONAL
Brand: BIOMET® BONE CEMENT R

## 2023-07-12 RX ORDER — SODIUM CHLORIDE 0.9 % (FLUSH) 0.9 %
5-40 SYRINGE (ML) INJECTION EVERY 12 HOURS SCHEDULED
Status: DISCONTINUED | OUTPATIENT
Start: 2023-07-12 | End: 2023-07-12 | Stop reason: HOSPADM

## 2023-07-12 RX ORDER — INSULIN LISPRO 100 [IU]/ML
0-4 INJECTION, SOLUTION INTRAVENOUS; SUBCUTANEOUS
Status: DISCONTINUED | OUTPATIENT
Start: 2023-07-12 | End: 2023-07-13

## 2023-07-12 RX ORDER — DIPHENHYDRAMINE HYDROCHLORIDE 50 MG/ML
12.5 INJECTION INTRAMUSCULAR; INTRAVENOUS
Status: DISCONTINUED | OUTPATIENT
Start: 2023-07-12 | End: 2023-07-12 | Stop reason: HOSPADM

## 2023-07-12 RX ORDER — ONDANSETRON 2 MG/ML
4 INJECTION INTRAMUSCULAR; INTRAVENOUS EVERY 6 HOURS PRN
Status: DISCONTINUED | OUTPATIENT
Start: 2023-07-12 | End: 2023-07-18 | Stop reason: HOSPADM

## 2023-07-12 RX ORDER — 0.9 % SODIUM CHLORIDE 0.9 %
1000 INTRAVENOUS SOLUTION INTRAVENOUS ONCE
Status: DISCONTINUED | OUTPATIENT
Start: 2023-07-12 | End: 2023-07-18 | Stop reason: HOSPADM

## 2023-07-12 RX ORDER — ONDANSETRON 2 MG/ML
INJECTION INTRAMUSCULAR; INTRAVENOUS PRN
Status: DISCONTINUED | OUTPATIENT
Start: 2023-07-12 | End: 2023-07-12 | Stop reason: SDUPTHER

## 2023-07-12 RX ORDER — HYDROMORPHONE HYDROCHLORIDE 2 MG/ML
0.25 INJECTION, SOLUTION INTRAMUSCULAR; INTRAVENOUS; SUBCUTANEOUS EVERY 5 MIN PRN
Status: DISCONTINUED | OUTPATIENT
Start: 2023-07-12 | End: 2023-07-12 | Stop reason: HOSPADM

## 2023-07-12 RX ORDER — MEPERIDINE HYDROCHLORIDE 25 MG/ML
12.5 INJECTION INTRAMUSCULAR; INTRAVENOUS; SUBCUTANEOUS EVERY 5 MIN PRN
Status: DISCONTINUED | OUTPATIENT
Start: 2023-07-12 | End: 2023-07-12 | Stop reason: HOSPADM

## 2023-07-12 RX ORDER — ATORVASTATIN CALCIUM 10 MG/1
10 TABLET, FILM COATED ORAL DAILY
COMMUNITY

## 2023-07-12 RX ORDER — KETOROLAC TROMETHAMINE 15 MG/ML
15 INJECTION, SOLUTION INTRAMUSCULAR; INTRAVENOUS EVERY 6 HOURS
Status: COMPLETED | OUTPATIENT
Start: 2023-07-12 | End: 2023-07-13

## 2023-07-12 RX ORDER — SODIUM CHLORIDE, SODIUM LACTATE, POTASSIUM CHLORIDE, CALCIUM CHLORIDE 600; 310; 30; 20 MG/100ML; MG/100ML; MG/100ML; MG/100ML
INJECTION, SOLUTION INTRAVENOUS CONTINUOUS
Status: DISCONTINUED | OUTPATIENT
Start: 2023-07-12 | End: 2023-07-18 | Stop reason: HOSPADM

## 2023-07-12 RX ORDER — SUCCINYLCHOLINE/SOD CL,ISO/PF 200MG/10ML
SYRINGE (ML) INTRAVENOUS PRN
Status: DISCONTINUED | OUTPATIENT
Start: 2023-07-12 | End: 2023-07-12 | Stop reason: SDUPTHER

## 2023-07-12 RX ORDER — FENTANYL CITRATE 50 UG/ML
INJECTION, SOLUTION INTRAMUSCULAR; INTRAVENOUS PRN
Status: DISCONTINUED | OUTPATIENT
Start: 2023-07-12 | End: 2023-07-12 | Stop reason: SDUPTHER

## 2023-07-12 RX ORDER — GLUCAGON 1 MG/ML
1 KIT INJECTION PRN
Status: DISCONTINUED | OUTPATIENT
Start: 2023-07-12 | End: 2023-07-18 | Stop reason: HOSPADM

## 2023-07-12 RX ORDER — POLYETHYLENE GLYCOL 3350 17 G/17G
17 POWDER, FOR SOLUTION ORAL DAILY PRN
Status: DISCONTINUED | OUTPATIENT
Start: 2023-07-12 | End: 2023-07-18 | Stop reason: HOSPADM

## 2023-07-12 RX ORDER — DEXAMETHASONE SODIUM PHOSPHATE 4 MG/ML
INJECTION, SOLUTION INTRA-ARTICULAR; INTRALESIONAL; INTRAMUSCULAR; INTRAVENOUS; SOFT TISSUE PRN
Status: DISCONTINUED | OUTPATIENT
Start: 2023-07-12 | End: 2023-07-12 | Stop reason: SDUPTHER

## 2023-07-12 RX ORDER — METOPROLOL SUCCINATE 50 MG/1
50 TABLET, EXTENDED RELEASE ORAL DAILY
Status: DISCONTINUED | OUTPATIENT
Start: 2023-07-13 | End: 2023-07-18 | Stop reason: HOSPADM

## 2023-07-12 RX ORDER — LEVOTHYROXINE SODIUM 0.1 MG/1
100 TABLET ORAL
Status: DISCONTINUED | OUTPATIENT
Start: 2023-07-13 | End: 2023-07-18 | Stop reason: HOSPADM

## 2023-07-12 RX ORDER — CLOPIDOGREL BISULFATE 75 MG/1
75 TABLET ORAL ONCE
Status: DISCONTINUED | OUTPATIENT
Start: 2023-07-13 | End: 2023-07-18 | Stop reason: HOSPADM

## 2023-07-12 RX ORDER — DEXTROSE MONOHYDRATE 100 MG/ML
INJECTION, SOLUTION INTRAVENOUS CONTINUOUS PRN
Status: DISCONTINUED | OUTPATIENT
Start: 2023-07-12 | End: 2023-07-18 | Stop reason: HOSPADM

## 2023-07-12 RX ORDER — DEXAMETHASONE SODIUM PHOSPHATE 4 MG/ML
4 INJECTION, SOLUTION INTRA-ARTICULAR; INTRALESIONAL; INTRAMUSCULAR; INTRAVENOUS; SOFT TISSUE ONCE
Status: COMPLETED | OUTPATIENT
Start: 2023-07-12 | End: 2023-07-12

## 2023-07-12 RX ORDER — PANTOPRAZOLE SODIUM 40 MG/1
40 TABLET, DELAYED RELEASE ORAL DAILY
Status: DISCONTINUED | OUTPATIENT
Start: 2023-07-12 | End: 2023-07-18 | Stop reason: HOSPADM

## 2023-07-12 RX ORDER — HYDROMORPHONE HYDROCHLORIDE 2 MG/ML
0.5 INJECTION, SOLUTION INTRAMUSCULAR; INTRAVENOUS; SUBCUTANEOUS EVERY 5 MIN PRN
Status: DISCONTINUED | OUTPATIENT
Start: 2023-07-12 | End: 2023-07-12 | Stop reason: HOSPADM

## 2023-07-12 RX ORDER — SODIUM CHLORIDE 0.9 % (FLUSH) 0.9 %
5-40 SYRINGE (ML) INJECTION PRN
Status: DISCONTINUED | OUTPATIENT
Start: 2023-07-12 | End: 2023-07-12 | Stop reason: HOSPADM

## 2023-07-12 RX ORDER — INSULIN GLARGINE 100 [IU]/ML
40 INJECTION, SOLUTION SUBCUTANEOUS EVERY MORNING
Status: DISCONTINUED | OUTPATIENT
Start: 2023-07-13 | End: 2023-07-18 | Stop reason: HOSPADM

## 2023-07-12 RX ORDER — SODIUM CHLORIDE 9 MG/ML
INJECTION, SOLUTION INTRAVENOUS PRN
Status: DISCONTINUED | OUTPATIENT
Start: 2023-07-12 | End: 2023-07-12 | Stop reason: HOSPADM

## 2023-07-12 RX ORDER — ATORVASTATIN CALCIUM 10 MG/1
10 TABLET, FILM COATED ORAL DAILY
Status: DISCONTINUED | OUTPATIENT
Start: 2023-07-12 | End: 2023-07-18 | Stop reason: HOSPADM

## 2023-07-12 RX ORDER — ROCURONIUM BROMIDE 10 MG/ML
INJECTION, SOLUTION INTRAVENOUS PRN
Status: DISCONTINUED | OUTPATIENT
Start: 2023-07-12 | End: 2023-07-12 | Stop reason: SDUPTHER

## 2023-07-12 RX ORDER — DIPHENHYDRAMINE HYDROCHLORIDE 50 MG/ML
INJECTION INTRAMUSCULAR; INTRAVENOUS PRN
Status: DISCONTINUED | OUTPATIENT
Start: 2023-07-12 | End: 2023-07-12 | Stop reason: SDUPTHER

## 2023-07-12 RX ORDER — CALCIUM CARBONATE 500(1250)
250 TABLET ORAL DAILY
Status: DISCONTINUED | OUTPATIENT
Start: 2023-07-12 | End: 2023-07-18 | Stop reason: HOSPADM

## 2023-07-12 RX ORDER — LIDOCAINE HYDROCHLORIDE 10 MG/ML
0.5 INJECTION, SOLUTION EPIDURAL; INFILTRATION; INTRACAUDAL; PERINEURAL ONCE
Status: DISCONTINUED | OUTPATIENT
Start: 2023-07-12 | End: 2023-07-12 | Stop reason: HOSPADM

## 2023-07-12 RX ORDER — VANCOMYCIN HYDROCHLORIDE 1 G/20ML
INJECTION, POWDER, LYOPHILIZED, FOR SOLUTION INTRAVENOUS
Status: COMPLETED | OUTPATIENT
Start: 2023-07-12 | End: 2023-07-12

## 2023-07-12 RX ORDER — HYDROMORPHONE HCL 110MG/55ML
PATIENT CONTROLLED ANALGESIA SYRINGE INTRAVENOUS PRN
Status: DISCONTINUED | OUTPATIENT
Start: 2023-07-12 | End: 2023-07-12 | Stop reason: SDUPTHER

## 2023-07-12 RX ORDER — SODIUM CHLORIDE 9 MG/ML
INJECTION, SOLUTION INTRAVENOUS CONTINUOUS
Status: DISCONTINUED | OUTPATIENT
Start: 2023-07-12 | End: 2023-07-12 | Stop reason: HOSPADM

## 2023-07-12 RX ORDER — TRANEXAMIC ACID 10 MG/ML
1000 INJECTION, SOLUTION INTRAVENOUS ONCE
Status: COMPLETED | OUTPATIENT
Start: 2023-07-12 | End: 2023-07-12

## 2023-07-12 RX ORDER — MAGNESIUM HYDROXIDE 1200 MG/15ML
LIQUID ORAL CONTINUOUS PRN
Status: COMPLETED | OUTPATIENT
Start: 2023-07-12 | End: 2023-07-12

## 2023-07-12 RX ORDER — LIDOCAINE HYDROCHLORIDE 20 MG/ML
INJECTION, SOLUTION EPIDURAL; INFILTRATION; INTRACAUDAL; PERINEURAL PRN
Status: DISCONTINUED | OUTPATIENT
Start: 2023-07-12 | End: 2023-07-12 | Stop reason: SDUPTHER

## 2023-07-12 RX ORDER — DEXMEDETOMIDINE HYDROCHLORIDE 100 UG/ML
INJECTION, SOLUTION INTRAVENOUS PRN
Status: DISCONTINUED | OUTPATIENT
Start: 2023-07-12 | End: 2023-07-12 | Stop reason: SDUPTHER

## 2023-07-12 RX ORDER — SENNA AND DOCUSATE SODIUM 50; 8.6 MG/1; MG/1
1 TABLET, FILM COATED ORAL 2 TIMES DAILY
Status: DISCONTINUED | OUTPATIENT
Start: 2023-07-12 | End: 2023-07-18 | Stop reason: HOSPADM

## 2023-07-12 RX ORDER — SULFAMETHOXAZOLE AND TRIMETHOPRIM 800; 160 MG/1; MG/1
1 TABLET ORAL EVERY 12 HOURS SCHEDULED
Status: DISCONTINUED | OUTPATIENT
Start: 2023-07-13 | End: 2023-07-18 | Stop reason: HOSPADM

## 2023-07-12 RX ORDER — INSULIN LISPRO 100 [IU]/ML
0-4 INJECTION, SOLUTION INTRAVENOUS; SUBCUTANEOUS NIGHTLY
Status: DISCONTINUED | OUTPATIENT
Start: 2023-07-12 | End: 2023-07-13

## 2023-07-12 RX ORDER — LOSARTAN POTASSIUM 100 MG/1
100 TABLET ORAL DAILY
Status: DISCONTINUED | OUTPATIENT
Start: 2023-07-13 | End: 2023-07-18 | Stop reason: HOSPADM

## 2023-07-12 RX ORDER — HYDROXYZINE HYDROCHLORIDE 10 MG/1
10 TABLET, FILM COATED ORAL EVERY 8 HOURS PRN
Status: DISCONTINUED | OUTPATIENT
Start: 2023-07-12 | End: 2023-07-18 | Stop reason: HOSPADM

## 2023-07-12 RX ORDER — ACETAMINOPHEN 500 MG
1000 TABLET ORAL ONCE
Status: COMPLETED | OUTPATIENT
Start: 2023-07-12 | End: 2023-07-12

## 2023-07-12 RX ORDER — ACETAMINOPHEN 500 MG
1000 TABLET ORAL EVERY 8 HOURS
Status: DISCONTINUED | OUTPATIENT
Start: 2023-07-12 | End: 2023-07-18 | Stop reason: HOSPADM

## 2023-07-12 RX ORDER — SODIUM CHLORIDE 0.9 % (FLUSH) 0.9 %
5-40 SYRINGE (ML) INJECTION EVERY 12 HOURS SCHEDULED
Status: DISCONTINUED | OUTPATIENT
Start: 2023-07-12 | End: 2023-07-18 | Stop reason: HOSPADM

## 2023-07-12 RX ORDER — ONDANSETRON 2 MG/ML
4 INJECTION INTRAMUSCULAR; INTRAVENOUS
Status: DISCONTINUED | OUTPATIENT
Start: 2023-07-12 | End: 2023-07-12 | Stop reason: HOSPADM

## 2023-07-12 RX ORDER — SODIUM CHLORIDE 0.9 % (FLUSH) 0.9 %
5-40 SYRINGE (ML) INJECTION PRN
Status: DISCONTINUED | OUTPATIENT
Start: 2023-07-12 | End: 2023-07-18 | Stop reason: HOSPADM

## 2023-07-12 RX ORDER — ACETAMINOPHEN 650 MG
TABLET, EXTENDED RELEASE ORAL
Status: COMPLETED | OUTPATIENT
Start: 2023-07-12 | End: 2023-07-12

## 2023-07-12 RX ORDER — ASPIRIN 81 MG/1
81 TABLET ORAL 2 TIMES DAILY
Status: DISCONTINUED | OUTPATIENT
Start: 2023-07-13 | End: 2023-07-18 | Stop reason: HOSPADM

## 2023-07-12 RX ORDER — MAGNESIUM HYDROXIDE 1200 MG/15ML
LIQUID ORAL
Status: COMPLETED | OUTPATIENT
Start: 2023-07-12 | End: 2023-07-12

## 2023-07-12 RX ORDER — MAGNESIUM HYDROXIDE/ALUMINUM HYDROXICE/SIMETHICONE 120; 1200; 1200 MG/30ML; MG/30ML; MG/30ML
15 SUSPENSION ORAL EVERY 6 HOURS PRN
Status: DISCONTINUED | OUTPATIENT
Start: 2023-07-12 | End: 2023-07-18 | Stop reason: HOSPADM

## 2023-07-12 RX ORDER — GLUCAGON 1 MG/ML
1 KIT INJECTION PRN
Status: DISCONTINUED | OUTPATIENT
Start: 2023-07-12 | End: 2023-07-12 | Stop reason: SDUPTHER

## 2023-07-12 RX ORDER — PROPOFOL 10 MG/ML
INJECTION, EMULSION INTRAVENOUS PRN
Status: DISCONTINUED | OUTPATIENT
Start: 2023-07-12 | End: 2023-07-12 | Stop reason: SDUPTHER

## 2023-07-12 RX ORDER — IPRATROPIUM BROMIDE AND ALBUTEROL SULFATE 2.5; .5 MG/3ML; MG/3ML
1 SOLUTION RESPIRATORY (INHALATION) EVERY 4 HOURS PRN
Status: DISCONTINUED | OUTPATIENT
Start: 2023-07-12 | End: 2023-07-18 | Stop reason: HOSPADM

## 2023-07-12 RX ORDER — SODIUM CHLORIDE 9 MG/ML
INJECTION, SOLUTION INTRAVENOUS PRN
Status: DISCONTINUED | OUTPATIENT
Start: 2023-07-12 | End: 2023-07-18 | Stop reason: HOSPADM

## 2023-07-12 RX ORDER — ONDANSETRON 4 MG/1
4 TABLET, ORALLY DISINTEGRATING ORAL EVERY 8 HOURS PRN
Status: DISCONTINUED | OUTPATIENT
Start: 2023-07-12 | End: 2023-07-18 | Stop reason: HOSPADM

## 2023-07-12 RX ORDER — OXYCODONE HYDROCHLORIDE 5 MG/1
5 TABLET ORAL EVERY 4 HOURS PRN
Status: DISCONTINUED | OUTPATIENT
Start: 2023-07-12 | End: 2023-07-18 | Stop reason: HOSPADM

## 2023-07-12 RX ADMIN — FENTANYL CITRATE 50 MCG: 50 INJECTION, SOLUTION INTRAMUSCULAR; INTRAVENOUS at 12:17

## 2023-07-12 RX ADMIN — SUGAMMADEX 200 MG: 100 INJECTION, SOLUTION INTRAVENOUS at 15:52

## 2023-07-12 RX ADMIN — VANCOMYCIN HYDROCHLORIDE 1500 MG: 1.5 INJECTION, POWDER, LYOPHILIZED, FOR SOLUTION INTRAVENOUS at 20:40

## 2023-07-12 RX ADMIN — PHENYLEPHRINE HYDROCHLORIDE 100 MCG: 10 INJECTION INTRAVENOUS at 14:15

## 2023-07-12 RX ADMIN — ROCURONIUM BROMIDE 50 MG: 10 INJECTION, SOLUTION INTRAVENOUS at 11:59

## 2023-07-12 RX ADMIN — PHENYLEPHRINE HYDROCHLORIDE 100 MCG: 10 INJECTION INTRAVENOUS at 14:42

## 2023-07-12 RX ADMIN — SODIUM CHLORIDE: 9 INJECTION, SOLUTION INTRAVENOUS at 13:15

## 2023-07-12 RX ADMIN — HYDROMORPHONE HYDROCHLORIDE 0.5 MG: 2 INJECTION, SOLUTION INTRAMUSCULAR; INTRAVENOUS; SUBCUTANEOUS at 12:42

## 2023-07-12 RX ADMIN — ROCURONIUM BROMIDE 30 MG: 10 INJECTION, SOLUTION INTRAVENOUS at 14:09

## 2023-07-12 RX ADMIN — DIPHENHYDRAMINE HYDROCHLORIDE 25 MG: 50 INJECTION, SOLUTION INTRAMUSCULAR; INTRAVENOUS at 11:50

## 2023-07-12 RX ADMIN — HYDROMORPHONE HYDROCHLORIDE 0.5 MG: 2 INJECTION, SOLUTION INTRAMUSCULAR; INTRAVENOUS; SUBCUTANEOUS at 12:24

## 2023-07-12 RX ADMIN — PHENYLEPHRINE HYDROCHLORIDE 100 MCG: 10 INJECTION INTRAVENOUS at 14:53

## 2023-07-12 RX ADMIN — TRANEXAMIC ACID 1000 MG: 10 INJECTION, SOLUTION INTRAVENOUS at 11:58

## 2023-07-12 RX ADMIN — SODIUM CHLORIDE: 9 INJECTION, SOLUTION INTRAVENOUS at 11:45

## 2023-07-12 RX ADMIN — PHENYLEPHRINE HYDROCHLORIDE 100 MCG: 10 INJECTION INTRAVENOUS at 14:36

## 2023-07-12 RX ADMIN — CEFAZOLIN 2000 MG: 2 INJECTION, POWDER, FOR SOLUTION INTRAMUSCULAR; INTRAVENOUS at 11:36

## 2023-07-12 RX ADMIN — DEXMEDETOMIDINE HYDROCHLORIDE 10 MCG: 100 INJECTION, SOLUTION INTRAVENOUS at 13:30

## 2023-07-12 RX ADMIN — Medication 200 MG: at 11:54

## 2023-07-12 RX ADMIN — STANDARDIZED SENNA CONCENTRATE AND DOCUSATE SODIUM 1 TABLET: 8.6; 5 TABLET ORAL at 20:32

## 2023-07-12 RX ADMIN — ONDANSETRON 4 MG: 2 INJECTION INTRAMUSCULAR; INTRAVENOUS at 11:59

## 2023-07-12 RX ADMIN — SODIUM CHLORIDE: 9 INJECTION, SOLUTION INTRAVENOUS at 15:20

## 2023-07-12 RX ADMIN — PROPOFOL 50 MG: 10 INJECTION, EMULSION INTRAVENOUS at 12:28

## 2023-07-12 RX ADMIN — PROPOFOL 150 MG: 10 INJECTION, EMULSION INTRAVENOUS at 11:54

## 2023-07-12 RX ADMIN — PHENYLEPHRINE HYDROCHLORIDE 100 MCG: 10 INJECTION INTRAVENOUS at 15:21

## 2023-07-12 RX ADMIN — HYDROMORPHONE HYDROCHLORIDE 1 MG: 2 INJECTION, SOLUTION INTRAMUSCULAR; INTRAVENOUS; SUBCUTANEOUS at 13:09

## 2023-07-12 RX ADMIN — KETOROLAC TROMETHAMINE 15 MG: 15 INJECTION, SOLUTION INTRAMUSCULAR; INTRAVENOUS at 20:33

## 2023-07-12 RX ADMIN — Medication 250 MG: at 20:32

## 2023-07-12 RX ADMIN — ROCURONIUM BROMIDE 20 MG: 10 INJECTION, SOLUTION INTRAVENOUS at 12:11

## 2023-07-12 RX ADMIN — PANTOPRAZOLE SODIUM 40 MG: 40 TABLET, DELAYED RELEASE ORAL at 20:32

## 2023-07-12 RX ADMIN — ATORVASTATIN CALCIUM 10 MG: 10 TABLET, FILM COATED ORAL at 20:33

## 2023-07-12 RX ADMIN — ROCURONIUM BROMIDE 30 MG: 10 INJECTION, SOLUTION INTRAVENOUS at 13:11

## 2023-07-12 RX ADMIN — OXYCODONE HYDROCHLORIDE 5 MG: 5 TABLET ORAL at 23:54

## 2023-07-12 RX ADMIN — PHENYLEPHRINE HYDROCHLORIDE 100 MCG: 10 INJECTION INTRAVENOUS at 15:29

## 2023-07-12 RX ADMIN — SODIUM CHLORIDE, POTASSIUM CHLORIDE, SODIUM LACTATE AND CALCIUM CHLORIDE: 600; 310; 30; 20 INJECTION, SOLUTION INTRAVENOUS at 16:30

## 2023-07-12 RX ADMIN — DEXMEDETOMIDINE HYDROCHLORIDE 10 MCG: 100 INJECTION, SOLUTION INTRAVENOUS at 13:45

## 2023-07-12 RX ADMIN — PHENYLEPHRINE HYDROCHLORIDE 100 MCG: 10 INJECTION INTRAVENOUS at 14:59

## 2023-07-12 RX ADMIN — PHENYLEPHRINE HYDROCHLORIDE 100 MCG: 10 INJECTION INTRAVENOUS at 14:20

## 2023-07-12 RX ADMIN — ACETAMINOPHEN 1000 MG: 500 TABLET ORAL at 20:33

## 2023-07-12 RX ADMIN — DEXAMETHASONE SODIUM PHOSPHATE 12 MG: 4 INJECTION, SOLUTION INTRAMUSCULAR; INTRAVENOUS at 11:55

## 2023-07-12 RX ADMIN — LIDOCAINE HYDROCHLORIDE 100 MG: 20 INJECTION, SOLUTION EPIDURAL; INFILTRATION; INTRACAUDAL; PERINEURAL at 11:54

## 2023-07-12 RX ADMIN — ACETAMINOPHEN 1000 MG: 500 TABLET ORAL at 10:58

## 2023-07-12 RX ADMIN — DIPHENHYDRAMINE HYDROCHLORIDE 25 MG: 50 INJECTION, SOLUTION INTRAMUSCULAR; INTRAVENOUS at 11:52

## 2023-07-12 RX ADMIN — DEXAMETHASONE SODIUM PHOSPHATE 4 MG: 4 INJECTION, SOLUTION INTRAMUSCULAR; INTRAVENOUS at 11:29

## 2023-07-12 RX ADMIN — FENTANYL CITRATE 50 MCG: 50 INJECTION, SOLUTION INTRAMUSCULAR; INTRAVENOUS at 12:11

## 2023-07-12 ASSESSMENT — PAIN DESCRIPTION - FREQUENCY
FREQUENCY: CONTINUOUS
FREQUENCY: CONTINUOUS

## 2023-07-12 ASSESSMENT — PAIN DESCRIPTION - PAIN TYPE
TYPE: ACUTE PAIN
TYPE: SURGICAL PAIN

## 2023-07-12 ASSESSMENT — PAIN DESCRIPTION - ORIENTATION
ORIENTATION: RIGHT
ORIENTATION: RIGHT

## 2023-07-12 ASSESSMENT — PAIN SCALES - GENERAL
PAINLEVEL_OUTOF10: 0
PAINLEVEL_OUTOF10: 3
PAINLEVEL_OUTOF10: 0
PAINLEVEL_OUTOF10: 6

## 2023-07-12 ASSESSMENT — PAIN - FUNCTIONAL ASSESSMENT
PAIN_FUNCTIONAL_ASSESSMENT: ACTIVITIES ARE NOT PREVENTED
PAIN_FUNCTIONAL_ASSESSMENT: ACTIVITIES ARE NOT PREVENTED

## 2023-07-12 ASSESSMENT — PAIN DESCRIPTION - LOCATION
LOCATION: LEG
LOCATION: LEG

## 2023-07-12 ASSESSMENT — PAIN DESCRIPTION - ONSET
ONSET: ON-GOING
ONSET: ON-GOING

## 2023-07-12 ASSESSMENT — PAIN DESCRIPTION - DESCRIPTORS
DESCRIPTORS: ACHING
DESCRIPTORS: ACHING

## 2023-07-12 NOTE — PROGRESS NOTES
Subtle linear lucency on R knee xray  Called to Dr. Jonh Velasco     He is ordering a different, additional XR to be done routinely (not stat) on 4T    Pt meets d/c criteria for phase 1 in PACU and has been seen by anesthesia. Ok to transfer to [facility]. Will alert anyone in waiting room for them and the nursing unit if applicable. Will continue to monitor for safety and comfort.     Edel VILLAN, RN  Pre-Op/Recovery   Same Day Surgery

## 2023-07-12 NOTE — PROGRESS NOTES
Post op XR done. PT called. They will see him tomorrow. Pt is drowsy and still on simple mask O2 supplement; otherwise doing well and as expected. Update texts sent to his wife in the waiting room.

## 2023-07-12 NOTE — H&P
Patient: Marcelo Anglin                  :    MRN: [id number]   Date of Visit: 23     Physician: Radha Grossman MD.     Reason for Visit: Status post Right knee explant and insertion of antibiotic spacer, cx positive for MRSA. Subjective History of Present Illness:     Marcelo Anglin is here for regularly scheduled follow-up s/p right knee insertion of static spacer. He is doing well, pain is well controlled. There is no fevers chills drianage. He is WBAT to the left ankle     There was a scab over the anterior knee that has resolved at this point. His recent ESR/CRP were WNL. He presents today for revision reconstruction and extensor mechanism reconstruction    Physical Examination??: ?   General: Patient is alert and oriented x 3 and appears comfortable. Right knee incision well apporximated, no driange, SILT, moving digits freely     HENT: Head: Normocephalic and atraumatic. Eyes: Extraocular Movements: Extraocular movements intact. Cardiovascular: Rate and Rhythm: Normal rate and regular rhythm. Pulses: Normal pulses. Pulmonary:  Effort: Pulmonary effort is normal. Breath sounds: Normal breath sounds. Abdominal: General: Abdomen is flat. Palpations: Abdomen is soft. Radiographs:no new     Assessment and Plan?: The patient is progressing well approximately 12 weeks s/p R knee insertion of antibiotic spacer    1. His ESR/CRP are reassuring off antibiotics, his anterior knee skin is healed  2. At this point I think he is ready for second stage. I explained the nature of the surgery that would include EM reconstruction and prolonged immobilzation~ 3mo    He is cleared for surgery at this point. We discussed the surgical procedure, recovery period, and protocol for pain management, expected post-operative course in detail.     We discussed the potential benefits of the surgery including pain relief, improved mobility, as well as the inherent risks including but are

## 2023-07-12 NOTE — ANESTHESIA PRE PROCEDURE
administered and Postoperative trial extubation. Anesthetic plan and risks discussed with patient. Plan discussed with CRNA.           Post-op pain plan if not by surgeon: single peripheral nerve block            Kendra Warren MD   7/12/2023

## 2023-07-12 NOTE — BRIEF OP NOTE
Brief Postoperative Note      Patient: Chalo Oviedo  YOB: 1945  MRN: [id number]    Date of Procedure: 7/12/2023    Pre-Op Diagnosis Codes:     * Chronic infection of knee joint prosthesis, subsequent encounter [T84.59XD, Z96.659]    Post-Op Diagnosis: Same       Procedure(s):  RIGHT REVISION TOTAL KNEE REPLACEMENT WITH MESH -Jair Estevess    Surgeon(s):  Deidre Bustos MD    Assistant:  Surgical Assistant: Lyndsay Wells; [facility]    Anesthesia: General    Estimated Blood Loss (mL): 673     Complications: None    Specimens:   * No specimens in log *    Implants:  Implant Name Type Inv.  Item Serial No.  Lot No. LRB No. Used Action   GRAFT BNE PTTY 10 CC INJ JUAN ANTONIO-OSTETIC - [id number]  GRAFT BNE PTTY 10 CC INJ JUAN ANTONIO-OSTETIC  LINKBIO PAT-WD [id number] Right 1 Implanted   MESH CRUZ [id number] INGUINAL POLYPR MFIL RECTANG - [id number]  MESH CRUZ [id number] INGUINAL POLYPR MFIL RECTANG  BARD DAVOL- [id number] Right 1 Implanted   CEMENT BNE 40GM HI VISC RADPQ FOR REV SURG - [id number]  CEMENT BNE 40GM HI VISC RADPQ FOR REV SURG  [employer]- [id number] Right 3 Implanted   CEMENT BNE 40GM HI VISC RADPQ FOR REV SURG - [id number]  CEMENT BNE 40GM HI VISC RADPQ FOR REV SURG  [employer]- [id number] Right 1 Implanted   NEXGEN ROTATING HINGE FEMORAL D-RT - [id number]  NEXGEN ROTATING HINGE FEMORAL D-RT  [facility] [id number] Right 1 Implanted   NEXGEN ROTATING HINGE STEM TIBIAL PLATE SIZE 3 - [id number]  NEXGEN ROTATING HINGE STEM TIBIAL PLATE SIZE 3  [employer]- [id number] Right 1 Implanted   NEXGEN STRAIGHT STEM EXT 14MM JOSH X 145MM(100MM) - [id number]  NEXGEN STRAIGHT STEM EXT 14MM JOSH X 145MM(100MM)  [facility] [id number] Right 1 Implanted   PSN REV TM TIB CENTRAL CONE [facility] - [id number]  PSN REV TM TIB C[facility]AL [place]  [facility] [id number] Right 1 Implanted   PSN REV TM FEM CENTRAL CONE SZ  -

## 2023-07-12 NOTE — ANESTHESIA POSTPROCEDURE EVALUATION
Department of Anesthesiology  Postprocedure Note    Patient: Mercy Swenson  MRN: [id number]  YOB: 1945  Date of evaluation: 7/12/2023      Procedure Summary     Date: 07/12/23 Room / Location: [address]    Anesthesia Start: 1145 Anesthesia Stop:     Procedure: RIGHT REVISION TOTAL KNEE REPLACEMENT WITH MESH -Ap Levans (Right: Knee) Diagnosis:       Chronic infection of knee joint prosthesis, subsequent encounter      (Chronic infection of knee joint prosthesis, subsequent encounter [T84.59XD, S80.649])    Surgeons: Bora Reddy MD Responsible Provider: Pat Mireles MD    Anesthesia Type: general ASA Status: 3          Anesthesia Type: No value filed.     Chino Phase I: Chino Score: 10    Chino Phase II:        Anesthesia Post Evaluation    Patient location during evaluation: PACU  Patient participation: complete - patient participated  Level of consciousness: awake  Airway patency: patent  Nausea & Vomiting: no vomiting and no nausea  Complications: no  Cardiovascular status: hemodynamically stable  Respiratory status: acceptable  Hydration status: stable  Multimodal analgesia pain management approach

## 2023-07-12 NOTE — PROGRESS NOTES
Pt arrived to PACU from OR in stable condition and is alert to physical stimuli. Pt can move extremities to command. Respirations are even on 10L O2 per SM. Skin warm, dry, and with appropriate for ethnicity color. Abd is soft. Pain is tolerable at this time.   Right knee surgical site(s) intact with dressing= liquiband secure, therabond, web roll, ace wrap, immobilizer      S/P: right revision total knee replacement with mesh with Dr. Edgar Sanchez at [address] [place] [address], RN  Pre-Op/Recovery   Same Day Surgery

## 2023-07-12 NOTE — ANESTHESIA PROCEDURE NOTES
Central Venous Line:    A central venous line was placed using surface landmarks, in the OR for the following indication(s): central venous access and CVP monitoring. 7/12/2023 11:30 AM7/12/2023 11:39 AM    Sterility preparation included the following: hand hygiene performed prior to procedure, maximum sterile barriers used and sterile technique used to drape from head to toe. The patient was placed in Trendelenburg position. The right internal jugular vein was prepped. The site was prepped with Chloraprep. A 7 Fr (size), triple lumen was placed. During the procedure, the following specific steps were taken: target vein identified, needle advanced into vein and blood aspirated and guidewire advanced into vein. Intravenous verification was obtained by ultrasound and venous blood return. Post insertion care included: all ports aspirated, all ports flushed easily, guidewire removed intact, Biopatch applied, line sutured in place and dressing applied. During the procedure the patient experienced: patient tolerated procedure well with no complications and EBL < 5mL. Outcomes: uncomplicated and patient tolerated procedure well  Anesthesia type: general.. No    Additional notes:  U/S [id number].  (1) US was used to identify the  vessel. (2) It was assessed and patent. (3) US was used to visualize vascular needle entry into the  vessel. (4) The selected vessel appeared anatomically normal and (5) there were no apparent abnormal findings. (6) A permanent ultrasound image was saved in the patient's record.       Staffing  Performed: Anesthesiologist   Anesthesiologist: Vanessa Ruvalcaba MD  Preanesthetic Checklist  Completed: patient identified, timeout performed and monitors applied/VS acknowledged

## 2023-07-12 NOTE — OP NOTE
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 POSTOPERATIVE DIAGNOSES: Same     PROCEDURES: Revision right total knee arthroplasty extensor mechanism augmentation with Marlex mesh. ANESTHESIA: General    COMPLICATIONS: None    ESTIMATED BLOOD LOSS: 200    SPECIMENS: None    DRAINS: Deep and superficial Hemovac    TOURNIQUET TIME: 2 hours    IMPLANTS USED:   Implant Name Type Inv.  Item Serial No.  Lot No. LRB No. Used Action   GRAFT BNE PTTY 10 CC INJ JUAN ANTONIO-OSTETIC - [id number]  GRAFT BNE PTTY 10 CC INJ JUAN ANTONIO-OSTETIC  [employer] PAT-WD [id number] Right 1 Implanted   MESH CRUZ [id number] INGUINAL POLYPR MFIL RECTANG - [id number]  MESH CRUZ [id number] INGUINAL POLYPR MFIL RECTANG  [facility] [id number] Right 1 Implanted   CEMENT BNE 40GM HI VISC RADPQ FOR REV SURG - [id number]  CEMENT BNE 40GM HI VISC RADPQ FOR REV SURG  [employer]- [id number] Right 3 Implanted   CEMENT BNE 40GM HI VISC RADPQ FOR REV SURG - [id number]  CEMENT BNE 40GM HI VISC RADPQ FOR REV SURG  [employer]- [id number] Right 1 Implanted   NEXGEN ROTATING HINGE FEMORAL D-RT - [id number]  NEXGEN ROTATING HINGE FEMORAL D-RT  [facility] [id number] Right 1 Implanted   NEXGEN ROTATING HINGE STEM TIBIAL PLATE SIZE 3 - [id number]  NEXGEN ROTATING HINGE STEM TIBIAL PLATE SIZE 3  [employer] [facility] [id number] Right 1 Implanted   NEXGEN STRAIGHT STEM EXT 14MM JOSH X 145MM(100MM) - [id number]  NEXGEN STRAIGHT STEM EXT 14MM JOSH X 145MM(100MM)  [facility] [id number] Right 1 Implanted   PSN REV TM TIB CENTRAL CONE SZ  - [id number]  PSN REV TM TIB CENTRAL CONE SZ [facility] [id number] Right 1 Implanted   PSN REV TM FEM

## 2023-07-13 ENCOUNTER — TELEPHONE (OUTPATIENT)
Dept: ORTHOPEDIC SURGERY | Age: 78
End: 2023-07-13

## 2023-07-13 LAB
ANION GAP SERPL CALCULATED.3IONS-SCNC: 7 MMOL/L (ref 3–16)
BUN SERPL-MCNC: 18 MG/DL (ref 7–20)
CALCIUM SERPL-MCNC: 8.9 MG/DL (ref 8.3–10.6)
CHLORIDE SERPL-SCNC: 100 MMOL/L (ref 99–110)
CO2 SERPL-SCNC: 27 MMOL/L (ref 21–32)
CREAT SERPL-MCNC: 1.3 MG/DL (ref 0.8–1.3)
DEPRECATED RDW RBC AUTO: 14.9 % (ref 12.4–15.4)
GFR SERPLBLD CREATININE-BSD FMLA CKD-EPI: 55 ML/MIN/{1.73_M2}
GLUCOSE BLD-MCNC: 249 MG/DL (ref 70–99)
GLUCOSE BLD-MCNC: 302 MG/DL (ref 70–99)
GLUCOSE BLD-MCNC: 315 MG/DL (ref 70–99)
GLUCOSE BLD-MCNC: 333 MG/DL (ref 70–99)
GLUCOSE SERPL-MCNC: 321 MG/DL (ref 70–99)
HCT VFR BLD AUTO: 34 % (ref 40.5–52.5)
HGB BLD-MCNC: 11.2 G/DL (ref 13.5–17.5)
MCH RBC QN AUTO: 30.4 PG (ref 26–34)
MCHC RBC AUTO-ENTMCNC: 33.1 G/DL (ref 31–36)
MCV RBC AUTO: 92 FL (ref 80–100)
PERFORMED ON: ABNORMAL
PLATELET # BLD AUTO: 189 K/UL (ref 135–450)
PMV BLD AUTO: 8.1 FL (ref 5–10.5)
POTASSIUM SERPL-SCNC: 5.1 MMOL/L (ref 3.5–5.1)
RBC # BLD AUTO: 3.69 M/UL (ref 4.2–5.9)
SODIUM SERPL-SCNC: 134 MMOL/L (ref 136–145)
WBC # BLD AUTO: 8.8 K/UL (ref 4–11)

## 2023-07-13 PROCEDURE — 2580000003 HC RX 258: Performed by: ORTHOPAEDIC SURGERY

## 2023-07-13 PROCEDURE — 6370000000 HC RX 637 (ALT 250 FOR IP): Performed by: NURSE PRACTITIONER

## 2023-07-13 PROCEDURE — APPNB45 APP NON BILLABLE 31-45 MINUTES: Performed by: NURSE PRACTITIONER

## 2023-07-13 PROCEDURE — 6360000002 HC RX W HCPCS: Performed by: ORTHOPAEDIC SURGERY

## 2023-07-13 PROCEDURE — 97535 SELF CARE MNGMENT TRAINING: CPT

## 2023-07-13 PROCEDURE — 97530 THERAPEUTIC ACTIVITIES: CPT

## 2023-07-13 PROCEDURE — 97166 OT EVAL MOD COMPLEX 45 MIN: CPT

## 2023-07-13 PROCEDURE — 6370000000 HC RX 637 (ALT 250 FOR IP): Performed by: ORTHOPAEDIC SURGERY

## 2023-07-13 PROCEDURE — 1200000000 HC SEMI PRIVATE

## 2023-07-13 PROCEDURE — 80048 BASIC METABOLIC PNL TOTAL CA: CPT

## 2023-07-13 PROCEDURE — 97162 PT EVAL MOD COMPLEX 30 MIN: CPT

## 2023-07-13 PROCEDURE — 99024 POSTOP FOLLOW-UP VISIT: CPT | Performed by: NURSE PRACTITIONER

## 2023-07-13 PROCEDURE — 2700000000 HC OXYGEN THERAPY PER DAY

## 2023-07-13 PROCEDURE — 85027 COMPLETE CBC AUTOMATED: CPT

## 2023-07-13 RX ORDER — INSULIN LISPRO 100 [IU]/ML
0-4 INJECTION, SOLUTION INTRAVENOUS; SUBCUTANEOUS NIGHTLY
Status: DISCONTINUED | OUTPATIENT
Start: 2023-07-13 | End: 2023-07-18 | Stop reason: HOSPADM

## 2023-07-13 RX ORDER — INSULIN LISPRO 100 [IU]/ML
0-8 INJECTION, SOLUTION INTRAVENOUS; SUBCUTANEOUS
Status: DISCONTINUED | OUTPATIENT
Start: 2023-07-13 | End: 2023-07-18 | Stop reason: HOSPADM

## 2023-07-13 RX ORDER — OXYCODONE HYDROCHLORIDE 5 MG/1
5 TABLET ORAL EVERY 4 HOURS PRN
Qty: 42 TABLET | Refills: 0 | Status: SHIPPED | OUTPATIENT
Start: 2023-07-13 | End: 2023-07-20

## 2023-07-13 RX ORDER — ACETAMINOPHEN 500 MG
1000 TABLET ORAL 3 TIMES DAILY
Qty: 84 TABLET | Refills: 0 | Status: SHIPPED | OUTPATIENT
Start: 2023-07-13 | End: 2023-07-27

## 2023-07-13 RX ORDER — SULFAMETHOXAZOLE AND TRIMETHOPRIM 800; 160 MG/1; MG/1
1 TABLET ORAL 2 TIMES DAILY
Qty: 60 TABLET | Refills: 2 | Status: SHIPPED | OUTPATIENT
Start: 2023-07-13 | End: 2023-10-11

## 2023-07-13 RX ORDER — SENNOSIDES A AND B 8.6 MG/1
1 TABLET, FILM COATED ORAL 2 TIMES DAILY
Qty: 28 TABLET | Refills: 0 | Status: SHIPPED | OUTPATIENT
Start: 2023-07-13 | End: 2023-07-27

## 2023-07-13 RX ADMIN — PANTOPRAZOLE SODIUM 40 MG: 40 TABLET, DELAYED RELEASE ORAL at 09:12

## 2023-07-13 RX ADMIN — Medication 250 MG: at 09:12

## 2023-07-13 RX ADMIN — ATORVASTATIN CALCIUM 10 MG: 10 TABLET, FILM COATED ORAL at 09:12

## 2023-07-13 RX ADMIN — INSULIN LISPRO 6 UNITS: 100 INJECTION, SOLUTION INTRAVENOUS; SUBCUTANEOUS at 17:48

## 2023-07-13 RX ADMIN — STANDARDIZED SENNA CONCENTRATE AND DOCUSATE SODIUM 1 TABLET: 8.6; 5 TABLET ORAL at 20:06

## 2023-07-13 RX ADMIN — KETOROLAC TROMETHAMINE 15 MG: 15 INJECTION, SOLUTION INTRAMUSCULAR; INTRAVENOUS at 03:25

## 2023-07-13 RX ADMIN — ACETAMINOPHEN 1000 MG: 500 TABLET ORAL at 17:48

## 2023-07-13 RX ADMIN — METOPROLOL SUCCINATE 50 MG: 50 TABLET, EXTENDED RELEASE ORAL at 09:27

## 2023-07-13 RX ADMIN — HYDROXYZINE HYDROCHLORIDE 10 MG: 10 TABLET ORAL at 13:59

## 2023-07-13 RX ADMIN — SODIUM CHLORIDE, POTASSIUM CHLORIDE, SODIUM LACTATE AND CALCIUM CHLORIDE: 600; 310; 30; 20 INJECTION, SOLUTION INTRAVENOUS at 03:27

## 2023-07-13 RX ADMIN — SULFAMETHOXAZOLE AND TRIMETHOPRIM 1 TABLET: 800; 160 TABLET ORAL at 09:12

## 2023-07-13 RX ADMIN — ACETAMINOPHEN 1000 MG: 500 TABLET ORAL at 09:12

## 2023-07-13 RX ADMIN — INSULIN LISPRO 6 UNITS: 100 INJECTION, SOLUTION INTRAVENOUS; SUBCUTANEOUS at 11:01

## 2023-07-13 RX ADMIN — ASPIRIN 81 MG: 81 TABLET, COATED ORAL at 09:12

## 2023-07-13 RX ADMIN — OXYCODONE HYDROCHLORIDE 5 MG: 5 TABLET ORAL at 05:58

## 2023-07-13 RX ADMIN — ASPIRIN 81 MG: 81 TABLET, COATED ORAL at 20:06

## 2023-07-13 RX ADMIN — STANDARDIZED SENNA CONCENTRATE AND DOCUSATE SODIUM 1 TABLET: 8.6; 5 TABLET ORAL at 09:12

## 2023-07-13 RX ADMIN — SULFAMETHOXAZOLE AND TRIMETHOPRIM 1 TABLET: 800; 160 TABLET ORAL at 20:06

## 2023-07-13 RX ADMIN — INSULIN GLARGINE 40 UNITS: 100 INJECTION, SOLUTION SUBCUTANEOUS at 11:01

## 2023-07-13 RX ADMIN — LEVOTHYROXINE SODIUM 100 MCG: 100 TABLET ORAL at 09:28

## 2023-07-13 RX ADMIN — ACETAMINOPHEN 1000 MG: 500 TABLET ORAL at 03:25

## 2023-07-13 RX ADMIN — SODIUM CHLORIDE, POTASSIUM CHLORIDE, SODIUM LACTATE AND CALCIUM CHLORIDE: 600; 310; 30; 20 INJECTION, SOLUTION INTRAVENOUS at 22:38

## 2023-07-13 ASSESSMENT — PAIN DESCRIPTION - LOCATION
LOCATION: LEG
LOCATION: LEG

## 2023-07-13 ASSESSMENT — PAIN DESCRIPTION - FREQUENCY
FREQUENCY: CONTINUOUS
FREQUENCY: CONTINUOUS

## 2023-07-13 ASSESSMENT — PAIN DESCRIPTION - DESCRIPTORS
DESCRIPTORS: ACHING
DESCRIPTORS: ACHING

## 2023-07-13 ASSESSMENT — PAIN DESCRIPTION - PAIN TYPE
TYPE: SURGICAL PAIN
TYPE: SURGICAL PAIN

## 2023-07-13 ASSESSMENT — PAIN SCALES - GENERAL
PAINLEVEL_OUTOF10: 3
PAINLEVEL_OUTOF10: 2
PAINLEVEL_OUTOF10: 4
PAINLEVEL_OUTOF10: 7

## 2023-07-13 ASSESSMENT — PAIN DESCRIPTION - ORIENTATION
ORIENTATION: RIGHT
ORIENTATION: RIGHT

## 2023-07-13 ASSESSMENT — PAIN DESCRIPTION - ONSET
ONSET: ON-GOING
ONSET: ON-GOING

## 2023-07-13 NOTE — TELEPHONE ENCOUNTER
General Question     Subject: PLEASE CALL LU[facility] - Pt BEING RELEASED  [facility] BE   Patient and /or Facility Request: [facility] Number: [phone]     [facility] LVM FOR OTONIEL. THE Pt IS STILL VERY GROGGY AND THEY ARE GOING TO RELEASE HIM BACK TO THE NURSING HOME. [facility] WOULD LIKE HIM TO STAY LONGER.      PLEASE CALL ASAP

## 2023-07-13 NOTE — CARE COORDINATION
Case Management Assessment  Initial Evaluation    Date/Time of Evaluation: 7/13/2023 12:08 PM  Assessment Completed by: PATEL Marshall    If patient is discharged prior to next notation, then this note serves as note for discharge by case management. Patient Name: Juventino Sosa                   YOB: 1945  Diagnosis: Chronic infection of knee joint prosthesis, subsequent encounter [T84.59XD, Z96.659]  Chronic infection of prosthetic knee, initial encounter ([address]) Sheri Vincent, Z96.659]                   Date / Time: 7/12/2023 10:00 AM    Patient Admission Status: Inpatient   Readmission Risk (Low < 19, Mod (19-27), High > 27): Readmission Risk Score: 12.1    Current PCP: Obed Headley MD  PCP verified by CM? Yes    Chart Reviewed: Yes      History Provided by: Patient  Patient Orientation: Alert and Oriented    Patient Cognition: Alert    Hospitalization in the last 30 days (Readmission):  No    If yes, Readmission Assessment in CM Navigator will be completed. Advance Directives:      Code Status: Full Code   Patient's Primary Decision Maker is: Legal Next of Kin ([address] [place] [address])      Discharge Planning:    Patient expects to discharge to: [address] [place] [address] for transportation at discharge: Other (see comment) (The patient will need medical trasnport at discharge)    Financial    Payor: Xiao Stephenson / Plan: Aureliano Browne / Product Type: *No Product type* /         Current Nursing Home Information:  Patient admitted from:  The [address]  Phone: [phone]  Fax: [phone]      Call to Nursing home staff, [facility] who confirmed the patient is: [address] required for return.       Patient Covid vaccination status:    Internal Administration   First Dose      Second Dose           Last COVID Lab No results found for: SARS-COV-2, SARS-COV-2 RNA, SARS-COV-2, SARS-COV-2, SARS-COV-2

## 2023-07-13 NOTE — PROGRESS NOTES
[address] [facility] Department   Phone: [phone]    Occupational Therapy    [x] Initial Evaluation            [] Daily Treatment Note         [] Discharge Summary      Patient: Katalina Kern   : 1945   MRN: [id number]   Date of Service:  2023    Admitting Diagnosis:  Chronic infection of prosthetic knee, initial encounter [facility])  Current Admission Summary: RIGHT REVISION TOTAL KNEE REPLACEMENT  Past Medical History:  has a past medical history of Acid reflux, CAD (coronary artery disease), DM (diabetes mellitus), type 2 ([address]), Hypertension, SANTI (obstructive sleep apnea), and Thyroid disease. Past Surgical History:  has a past surgical history that includes Cardiac catheterization; Revision total knee arthroplasty (Right); Revision total knee arthroplasty (Right); Revision total knee arthroplasty (Right, 3/28/2023); and Revision total knee arthroplasty (Right, 2023). Discharge Recommendations: Katalina Kern scored a 13/24 on the AM-PAC ADL Inpatient form. Current research shows that an AM-PAC score of 17 or less is typically not associated with a discharge to the patient's home setting. Based on the patient's AM-PAC score and their current ADL deficits, it is recommended that the patient have 3-5 sessions per week of Occupational Therapy at d/c to increase the patient's independence. Please see assessment section for further patient specific details. If patient discharges prior to next session this note will serve as a discharge summary. Please see below for the latest assessment towards goals.        DME Required For Discharge: DME to be determined at next level of care, DME to be determined pending patient progress    Precautions/Restrictions: high fall risk  Weight Bearing Restrictions: non weight bearing  [] Right Upper Extremity  [] Left Upper Extremity [x] Right Lower Extremity  [] Left Lower Extremity     Required Braces/Orthotics: knee

## 2023-07-13 NOTE — PROGRESS NOTES
07/13/23 0114   RT Protocol   History Pulmonary Disease 0   Respiratory pattern 2   Breath sounds 2   Cough 0   Indications for Bronchodilator Therapy Decreased or absent breath sounds   Bronchodilator Assessment Score 4

## 2023-07-13 NOTE — PLAN OF CARE
Problem: Pain  Goal: Verbalizes/displays adequate comfort level or baseline comfort level  Outcome: Progressing   Numeric pain rating scale being used. Patient repositioned for comfort. Ice applied. Patient is tolerating PO pain medicine. Problem: Safety - Adult  Goal: Free from fall injury  Outcome: Progressing   Patient has remained free of falls. 2/4 bed rails up, bed locked and in lowest position, call light within reach. Patient instructed on use of call light and uses appropriately. Bed alarm on. Non-skid footwear and fall band on.

## 2023-07-13 NOTE — CARE COORDINATION
Discharge Planning. The SW faxed updated therapy and clinical notes to [facility]  in [place] to start pre-cert. The SW will follow if pre-cert has been started.        Electronically signed by PATEL Cassidy on 7/13/2023 at 2:40 PM

## 2023-07-13 NOTE — PROGRESS NOTES
Patient is alert and oriented x4, no more confusion overnight. VSS. Dressing to knee is CDI, immobilizer in place. Neuro checks WNL. Hemovac B with considerable blood output, hemovac A with little. Urinating adequately via sellers. Pain controlled with PO pain medicine. Patient wore home cpap overnight. IS encouraged. Patient's tooth chipped overnight, stated that he broke it last week and \"this has been happening'. Fall precautions in place.

## 2023-07-13 NOTE — PROGRESS NOTES
[address] [facility] Department   Phone: [phone]    Physical Therapy    [x] Initial Evaluation            [] Daily Treatment Note         [] Discharge Summary      Patient: Yaakov Jacob   :    MRN: [id number]   Date of Service:  2023  Admitting Diagnosis: Chronic infection of prosthetic knee, initial encounter [facility])  Current Admission Summary: Pt s/p R TKA revision 23. Past Medical History:  has a past medical history of Acid reflux, CAD (coronary artery disease), DM (diabetes mellitus), type 2 ([address]), Hypertension, SANTI (obstructive sleep apnea), and Thyroid disease. Past Surgical History:  has a past surgical history that includes Cardiac catheterization; Revision total knee arthroplasty (Right); Revision total knee arthroplasty (Right); Revision total knee arthroplasty (Right, 3/28/2023); and Revision total knee arthroplasty (Right, 2023). Discharge Recommendations: Yaakov Jacob scored a 9/24 on the AM-PAC short mobility form. Current research shows that an AM-PAC score of 17 or less is typically not associated with a discharge to the patient's home setting. Based on the patient's AM-PAC score and their current functional mobility deficits, it is recommended that the patient have 3-5 sessions per week of Physical Therapy at d/c to increase the patient's independence. Please see assessment section for further patient specific details. If patient discharges prior to next session this note will serve as a discharge summary. Please see below for the latest assessment towards goals.       DME Required For Discharge: DME to be determined at next level of care  Precautions/Restrictions: high fall risk, contact precautions  Weight Bearing Restrictions: non weight bearing  [] Right Upper Extremity  [] Left Upper Extremity [x] Right Lower Extremity  [] Left Lower Extremity     Required Braces/Orthotics: knee immobilizer   [x] Right  []

## 2023-07-13 NOTE — TELEPHONE ENCOUNTER
I spoke with both the pt's wife and  Dr Antonio Tafoya.  Dr Antonio Tafoya stated that it is not the plan to send the pt home tomorrow and that they can't send the pt's home without Dr Antonio Tafoya discharging him

## 2023-07-14 LAB
GLUCOSE BLD-MCNC: 178 MG/DL (ref 70–99)
GLUCOSE BLD-MCNC: 200 MG/DL (ref 70–99)
GLUCOSE BLD-MCNC: 201 MG/DL (ref 70–99)
GLUCOSE BLD-MCNC: 215 MG/DL (ref 70–99)
PERFORMED ON: ABNORMAL

## 2023-07-14 PROCEDURE — 6370000000 HC RX 637 (ALT 250 FOR IP): Performed by: ORTHOPAEDIC SURGERY

## 2023-07-14 PROCEDURE — 97530 THERAPEUTIC ACTIVITIES: CPT

## 2023-07-14 PROCEDURE — 97110 THERAPEUTIC EXERCISES: CPT

## 2023-07-14 PROCEDURE — 99024 POSTOP FOLLOW-UP VISIT: CPT | Performed by: NURSE PRACTITIONER

## 2023-07-14 PROCEDURE — APPNB45 APP NON BILLABLE 31-45 MINUTES: Performed by: NURSE PRACTITIONER

## 2023-07-14 PROCEDURE — 6370000000 HC RX 637 (ALT 250 FOR IP): Performed by: NURSE PRACTITIONER

## 2023-07-14 PROCEDURE — 1200000000 HC SEMI PRIVATE

## 2023-07-14 PROCEDURE — 2580000003 HC RX 258: Performed by: ORTHOPAEDIC SURGERY

## 2023-07-14 RX ADMIN — ATORVASTATIN CALCIUM 10 MG: 10 TABLET, FILM COATED ORAL at 08:38

## 2023-07-14 RX ADMIN — ASPIRIN 81 MG: 81 TABLET, COATED ORAL at 08:38

## 2023-07-14 RX ADMIN — PANTOPRAZOLE SODIUM 40 MG: 40 TABLET, DELAYED RELEASE ORAL at 08:38

## 2023-07-14 RX ADMIN — SULFAMETHOXAZOLE AND TRIMETHOPRIM 1 TABLET: 800; 160 TABLET ORAL at 20:27

## 2023-07-14 RX ADMIN — STANDARDIZED SENNA CONCENTRATE AND DOCUSATE SODIUM 1 TABLET: 8.6; 5 TABLET ORAL at 08:38

## 2023-07-14 RX ADMIN — ACETAMINOPHEN 1000 MG: 500 TABLET ORAL at 08:38

## 2023-07-14 RX ADMIN — LOSARTAN POTASSIUM 100 MG: 100 TABLET, FILM COATED ORAL at 08:37

## 2023-07-14 RX ADMIN — Medication 250 MG: at 08:38

## 2023-07-14 RX ADMIN — Medication 10 ML: at 20:29

## 2023-07-14 RX ADMIN — ACETAMINOPHEN 1000 MG: 500 TABLET ORAL at 18:51

## 2023-07-14 RX ADMIN — INSULIN LISPRO 2 UNITS: 100 INJECTION, SOLUTION INTRAVENOUS; SUBCUTANEOUS at 08:38

## 2023-07-14 RX ADMIN — SULFAMETHOXAZOLE AND TRIMETHOPRIM 1 TABLET: 800; 160 TABLET ORAL at 08:38

## 2023-07-14 RX ADMIN — LEVOTHYROXINE SODIUM 100 MCG: 100 TABLET ORAL at 08:38

## 2023-07-14 RX ADMIN — INSULIN GLARGINE 40 UNITS: 100 INJECTION, SOLUTION SUBCUTANEOUS at 08:38

## 2023-07-14 RX ADMIN — INSULIN LISPRO 2 UNITS: 100 INJECTION, SOLUTION INTRAVENOUS; SUBCUTANEOUS at 13:45

## 2023-07-14 RX ADMIN — ACETAMINOPHEN 1000 MG: 500 TABLET ORAL at 02:32

## 2023-07-14 RX ADMIN — METOPROLOL SUCCINATE 50 MG: 50 TABLET, EXTENDED RELEASE ORAL at 08:38

## 2023-07-14 RX ADMIN — ASPIRIN 81 MG: 81 TABLET, COATED ORAL at 20:27

## 2023-07-14 NOTE — PROGRESS NOTES
[address] [facility] Department   Phone: [phone]    Occupational Therapy    [] Initial Evaluation            [x] Daily Treatment Note         [] Discharge Summary      Patient: Marek Gerber   : 1945   MRN: [id number]   Date of Service:  2023    Admitting Diagnosis:  Chronic infection of prosthetic knee, initial encounter [facility])  Current Admission Summary: RIGHT REVISION TOTAL KNEE REPLACEMENT  Past Medical History:  has a past medical history of Acid reflux, CAD (coronary artery disease), DM (diabetes mellitus), type 2 ([address]), Hypertension, SANTI (obstructive sleep apnea), and Thyroid disease. Past Surgical History:  has a past surgical history that includes Cardiac catheterization; Revision total knee arthroplasty (Right); Revision total knee arthroplasty (Right); Revision total knee arthroplasty (Right, 3/28/2023); and Revision total knee arthroplasty (Right, 2023). Discharge Recommendations: Marek Gerber scored a 13/24 on the AM-PAC ADL Inpatient form. Current research shows that an AM-PAC score of 17 or less is typically not associated with a discharge to the patient's home setting. Based on the patient's AM-PAC score and their current ADL deficits, it is recommended that the patient have 3-5 sessions per week of Occupational Therapy at d/c to increase the patient's independence. Please see assessment section for further patient specific details. If patient discharges prior to next session this note will serve as a discharge summary. Please see below for the latest assessment towards goals.        DME Required For Discharge: DME to be determined at next level of care, DME to be determined pending patient progress    Precautions/Restrictions: high fall risk  Weight Bearing Restrictions: non weight bearing  [] Right Upper Extremity  [] Left Upper Extremity [x] Right Lower Extremity  [] Left Lower Extremity     Required Braces/Orthotics: knee

## 2023-07-14 NOTE — PROGRESS NOTES
Assessment:  patient verbalizes and demonstrates understanding    Assessment  Activity Tolerance: Good. No adverse symptoms or reactions to activity. Impairments Requiring Therapeutic Intervention: decreased functional mobility, decreased ADL status, decreased ROM, decreased strength, decreased endurance, decreased balance  Prognosis: good  Clinical Assessment: Pt presents s/p R knee revision. Pt currently rehabbing R knee at SNF with plan to return to SNF to complete rehab. Pt currently requiring 2 peson assist for bed mobility and Max Ax2 for SBT. Pt demonstrated good core strength with EOB exercises. Would benefit from further exercises focused on (L) LE strengthening and arm strengthening. Pt would benefit from acute PT services to address deficits and facilitate return to PLOF. Safety Interventions: patient left in bed, bed alarm in place, call light within reach, gait belt, patient at risk for falls, and nurse notified    Plan  Frequency: 7 x/week  Current Treatment Recommendations: strengthening, balance training, functional mobility training, transfer training, endurance training, neuromuscular re-education, wheelchair mobility training, modalities, patient/caregiver education, home exercise program, safety education, equipment evaluation/education, and positioning    Goals  Patient Goals: to finish up rehab   Short Term Goals:  Time Frame: upon d/c  Short term goal 1: Pt will perform rolling with SBA  Short term goal 2: Pt will perform sup<>sit with Mod Ax1  Short term goal 3: Pt will perform SBT with Mod Ax2  Short term goal 4: Pt will perform STS with LRAD and Max Ax2     Above goals reviewed on 7/14/2023. All goals are ongoing at this time unless indicated above.     Therapy Session Time      Individual Group Co-treatment   Time In     0373   Time Out     1257   Minutes     44     Timed Code Treatment Minutes:  44 Minutes  Total Treatment Minutes: 44 minutes       Electronically Signed By: Wade Blackwell

## 2023-07-14 NOTE — CARE COORDINATION
DEISI received voicemail from [facility] offering P2P. DEISI sent MD Ortho Team Dr. Ale Chow and Dr. Dieter Gonsales. Requesting P2P to be completed for Patient SNF stay. P2P can be called in by MD or CNP, [phone]. Needs completed by 7/17/2023 Monday by 9:45 am or patinet will be denied. Thalia LIPSCOMB [id number]     Electronically signed by Donna Liang on 7/14/2023 at 3:08 PM     DEISI spoke with Dr. Nathan Chavez who called for patient Insurance number and reports his calling to complete P2P now.     Electronically signed by Donna Liang on 7/14/2023 at 3:46 PM

## 2023-07-14 NOTE — CARE COORDINATION
CM placed call to [facility] to follow up to ensure Pre-Cert has been started. Called [phone] LVM for Claudia Berumen in admission to return call.      Electronically signed by Asuncion Alvarado on 7/14/2023 at 8:42 AM

## 2023-07-14 NOTE — PROGRESS NOTES
2002: assessment completed, VSS, medications given as per [facility], call light within reach, will continue to monitor. The care plan and education has been reviewed and mutually agreed upon with the patient.

## 2023-07-15 LAB
GLUCOSE BLD-MCNC: 124 MG/DL (ref 70–99)
GLUCOSE BLD-MCNC: 153 MG/DL (ref 70–99)
GLUCOSE BLD-MCNC: 215 MG/DL (ref 70–99)
GLUCOSE BLD-MCNC: 250 MG/DL (ref 70–99)
PERFORMED ON: ABNORMAL

## 2023-07-15 PROCEDURE — 97110 THERAPEUTIC EXERCISES: CPT

## 2023-07-15 PROCEDURE — 97530 THERAPEUTIC ACTIVITIES: CPT

## 2023-07-15 PROCEDURE — 1200000000 HC SEMI PRIVATE

## 2023-07-15 PROCEDURE — 6370000000 HC RX 637 (ALT 250 FOR IP): Performed by: ORTHOPAEDIC SURGERY

## 2023-07-15 PROCEDURE — 2580000003 HC RX 258: Performed by: ORTHOPAEDIC SURGERY

## 2023-07-15 PROCEDURE — 97535 SELF CARE MNGMENT TRAINING: CPT

## 2023-07-15 PROCEDURE — 6370000000 HC RX 637 (ALT 250 FOR IP): Performed by: NURSE PRACTITIONER

## 2023-07-15 RX ADMIN — Medication 10 ML: at 20:10

## 2023-07-15 RX ADMIN — INSULIN LISPRO 2 UNITS: 100 INJECTION, SOLUTION INTRAVENOUS; SUBCUTANEOUS at 12:33

## 2023-07-15 RX ADMIN — ATORVASTATIN CALCIUM 10 MG: 10 TABLET, FILM COATED ORAL at 09:25

## 2023-07-15 RX ADMIN — ACETAMINOPHEN 1000 MG: 500 TABLET ORAL at 09:26

## 2023-07-15 RX ADMIN — STANDARDIZED SENNA CONCENTRATE AND DOCUSATE SODIUM 1 TABLET: 8.6; 5 TABLET ORAL at 09:25

## 2023-07-15 RX ADMIN — LEVOTHYROXINE SODIUM 100 MCG: 100 TABLET ORAL at 09:29

## 2023-07-15 RX ADMIN — LOSARTAN POTASSIUM 100 MG: 100 TABLET, FILM COATED ORAL at 09:25

## 2023-07-15 RX ADMIN — INSULIN GLARGINE 40 UNITS: 100 INJECTION, SOLUTION SUBCUTANEOUS at 09:24

## 2023-07-15 RX ADMIN — PANTOPRAZOLE SODIUM 40 MG: 40 TABLET, DELAYED RELEASE ORAL at 09:26

## 2023-07-15 RX ADMIN — SULFAMETHOXAZOLE AND TRIMETHOPRIM 1 TABLET: 800; 160 TABLET ORAL at 20:08

## 2023-07-15 RX ADMIN — ASPIRIN 81 MG: 81 TABLET, COATED ORAL at 09:25

## 2023-07-15 RX ADMIN — ASPIRIN 81 MG: 81 TABLET, COATED ORAL at 20:07

## 2023-07-15 RX ADMIN — Medication 250 MG: at 09:25

## 2023-07-15 RX ADMIN — SULFAMETHOXAZOLE AND TRIMETHOPRIM 1 TABLET: 800; 160 TABLET ORAL at 09:26

## 2023-07-15 RX ADMIN — METOPROLOL SUCCINATE 50 MG: 50 TABLET, EXTENDED RELEASE ORAL at 09:26

## 2023-07-15 ASSESSMENT — PAIN SCALES - GENERAL: PAINLEVEL_OUTOF10: 0

## 2023-07-15 NOTE — PROGRESS NOTES
Left  Positional Restrictions:no positional restrictions    Pre-Admission Information   Lives With: spouse; able to help if needed                 Type of Home: house  Home Layout: two level, bedroom/bathroom upstairs; 13 steps to get to bedroom; handrail on R side ascending  Home Access: level entry  Bathroom Layout: tub/shower unit; step-safe arrangement  Bathroom Equipment: grab bars around toilet  Toilet Height: standard height  Home Equipment: rolling walker, single point cane  Transfer Assistance: modified independent with use of rolling walker since ankle injury  Ambulation Assistance:modified independent with use of rolling walker since ankle injury  ADL Assistance: independent with all ADL's  IADL Assistance: independent with homemaking tasks  Active :        [x] Yes                 [] No  Hand Dominance: [] Left                 [x] Right  Current Employment: retired. Occupation: worked as an apprentice  Hobbies:   [address] [place] [address]: recent fall on March 5th getting into his car  Has a bed in the living room he can use if needed     --Pt had knee revision in April 2023 and discharged from [facility] to [facility] for antibiotics. After IV antibiotic regimen ceased, pt then transferred to [facility] in [place], [place]. While in therapy, pt was practicing slide board transfers, sit>stands, and UB ADLs. Pt states he was able to tolerate 30-50 sec in static stance with RW --bearing weight primarily on LLE. Examination   Vision:   Vision Gross Assessment: Impaired and Vision Corrective Device: wears glasses at all times  Hearing:   [facility]  Observation:   General Observation:  R KI present, x1 hemovac drains in place R knee      Subjective  General: Pt supine in bed upon arrival. Agreeable to PT/OT cotx.   Pain: 0/10  Pain Interventions: not applicable       Functional Mobility  Bed Mobility:  Supine to Sit: 2 person assistance with Min A   Sit to Supine: 2 person assistance with Mod A   Rolling Left: moderate Vaccine status unknown

## 2023-07-15 NOTE — PROGRESS NOTES
Pt resting up to chair. Requested and received urinal to void. Denies other needs at this time. Had previously been using [employer] system and produced 500 ml isma urine. Call light in reach and denies other needs at this time.

## 2023-07-15 NOTE — PROGRESS NOTES
Inpatient Daily Activity Raw Score: 13    Cognition  WFL  Orientation:    alert and oriented x 4  Command Following:   [facility]  Barriers To Learning: none  Patient Education: patient educated on goals, OT role and benefits, plan of care, ADL adaptive strategies, weight-bearing education, proper use of assistive device/equipment, energy conservation, transfer training, discharge recommendations  Learning Assessment:  patient verbalizes understanding, would benefit from continued reinforcement    Assessment  Activity Tolerance: Pt tolerated treatment well, tolerated MaxiSky bed to recliner     Impairments Requiring Therapeutic Intervention: decreased functional mobility, decreased ADL status, decreased ROM, decreased safety awareness, decreased endurance, decreased balance, increased pain  Prognosis: good  Clinical Assessment: Pt presenting below his baseline with the above deficits associated with R total knee revision by Dr. Casie Starks. He is primarily limited by NWB status on RLE, decreased balance, and deconditioning. Pt currently requires Max A x2 for slide board transfer or dep of 2 for [facility] transfer, Mod A of 2 for bed mobility, Max -total assist for LB ADLs. Pt currently unable to safely perform/attempt standing without maintaining NWB status. Continued OT indicated in order to maximize safety and independence with all occupational pursuits.        Safety Interventions: patient left in chair, chair alarm in place, call light within reach, and nurse notified    Plan  Frequency: 7 x/week  Current Treatment Recommendations: strengthening, balance training, functional mobility training, transfer training, endurance training, ADL/self-care training, IADL training, and safety education    Goals  Patient Goals: Return to home   Short Term Goals:  Time Frame: Discharge  Patient will complete upper body ADL at set up assistance, supervision   Patient will complete lower body ADL at minimal assistance, with LB

## 2023-07-15 NOTE — PROGRESS NOTES
Pt resting awake in bed. Immobilizer in place to right leg. Pt reports full sensation in SHEILA LE, warm to touch and can moves toes on command SHEILA. Denies pain. PO meds taken easily with water. Pt is axox4 and able to answer questions and follow commands throughout assessment. [address] system in place draining clear yellow urine. Call light in reach, bed alarm armed. Will continue to monitor.

## 2023-07-16 LAB
GLUCOSE BLD-MCNC: 144 MG/DL (ref 70–99)
GLUCOSE BLD-MCNC: 208 MG/DL (ref 70–99)
GLUCOSE BLD-MCNC: 218 MG/DL (ref 70–99)
GLUCOSE BLD-MCNC: 257 MG/DL (ref 70–99)
PERFORMED ON: ABNORMAL

## 2023-07-16 PROCEDURE — 99024 POSTOP FOLLOW-UP VISIT: CPT | Performed by: PHYSICIAN ASSISTANT

## 2023-07-16 PROCEDURE — APPNB45 APP NON BILLABLE 31-45 MINUTES: Performed by: PHYSICIAN ASSISTANT

## 2023-07-16 PROCEDURE — 2580000003 HC RX 258: Performed by: ORTHOPAEDIC SURGERY

## 2023-07-16 PROCEDURE — 1200000000 HC SEMI PRIVATE

## 2023-07-16 PROCEDURE — 6370000000 HC RX 637 (ALT 250 FOR IP): Performed by: ORTHOPAEDIC SURGERY

## 2023-07-16 PROCEDURE — 6370000000 HC RX 637 (ALT 250 FOR IP): Performed by: NURSE PRACTITIONER

## 2023-07-16 PROCEDURE — 97530 THERAPEUTIC ACTIVITIES: CPT

## 2023-07-16 RX ADMIN — INSULIN LISPRO 2 UNITS: 100 INJECTION, SOLUTION INTRAVENOUS; SUBCUTANEOUS at 11:59

## 2023-07-16 RX ADMIN — PANTOPRAZOLE SODIUM 40 MG: 40 TABLET, DELAYED RELEASE ORAL at 09:10

## 2023-07-16 RX ADMIN — METOPROLOL SUCCINATE 50 MG: 50 TABLET, EXTENDED RELEASE ORAL at 09:10

## 2023-07-16 RX ADMIN — ATORVASTATIN CALCIUM 10 MG: 10 TABLET, FILM COATED ORAL at 09:10

## 2023-07-16 RX ADMIN — Medication 10 ML: at 22:16

## 2023-07-16 RX ADMIN — ASPIRIN 81 MG: 81 TABLET, COATED ORAL at 09:10

## 2023-07-16 RX ADMIN — STANDARDIZED SENNA CONCENTRATE AND DOCUSATE SODIUM 1 TABLET: 8.6; 5 TABLET ORAL at 22:14

## 2023-07-16 RX ADMIN — SULFAMETHOXAZOLE AND TRIMETHOPRIM 1 TABLET: 800; 160 TABLET ORAL at 09:10

## 2023-07-16 RX ADMIN — ACETAMINOPHEN 1000 MG: 500 TABLET ORAL at 17:24

## 2023-07-16 RX ADMIN — SULFAMETHOXAZOLE AND TRIMETHOPRIM 1 TABLET: 800; 160 TABLET ORAL at 22:15

## 2023-07-16 RX ADMIN — ACETAMINOPHEN 1000 MG: 500 TABLET ORAL at 09:09

## 2023-07-16 RX ADMIN — ASPIRIN 81 MG: 81 TABLET, COATED ORAL at 22:14

## 2023-07-16 RX ADMIN — INSULIN GLARGINE 40 UNITS: 100 INJECTION, SOLUTION SUBCUTANEOUS at 09:08

## 2023-07-16 RX ADMIN — Medication 250 MG: at 09:10

## 2023-07-16 RX ADMIN — LOSARTAN POTASSIUM 100 MG: 100 TABLET, FILM COATED ORAL at 09:10

## 2023-07-16 RX ADMIN — INSULIN LISPRO 2 UNITS: 100 INJECTION, SOLUTION INTRAVENOUS; SUBCUTANEOUS at 17:25

## 2023-07-16 RX ADMIN — STANDARDIZED SENNA CONCENTRATE AND DOCUSATE SODIUM 1 TABLET: 8.6; 5 TABLET ORAL at 09:10

## 2023-07-16 RX ADMIN — LEVOTHYROXINE SODIUM 100 MCG: 100 TABLET ORAL at 09:13

## 2023-07-16 ASSESSMENT — PAIN DESCRIPTION - ORIENTATION
ORIENTATION: RIGHT
ORIENTATION: RIGHT;LEFT
ORIENTATION: RIGHT;LEFT

## 2023-07-16 ASSESSMENT — PAIN DESCRIPTION - LOCATION
LOCATION: BACK
LOCATION: LEG
LOCATION: BACK

## 2023-07-16 ASSESSMENT — PAIN SCALES - GENERAL
PAINLEVEL_OUTOF10: 6
PAINLEVEL_OUTOF10: 4
PAINLEVEL_OUTOF10: 5

## 2023-07-16 ASSESSMENT — PAIN DESCRIPTION - DESCRIPTORS
DESCRIPTORS: ACHING

## 2023-07-16 NOTE — PROGRESS NOTES
[address] [facility] Department   Phone: [phone]    Physical Therapy    [] Initial Evaluation            [x] Daily Treatment Note         [] Discharge Summary      Patient: Rosi Mtz   : [birth year]   MRN: [id number]   Date of Service:  2023  Admitting Diagnosis: Chronic infection of prosthetic knee, initial encounter [facility])  Current Admission Summary: Pt s/p R TKA revision 23. Past Medical History:  has a past medical history of Acid reflux, CAD (coronary artery disease), DM (diabetes mellitus), type 2 ([address]), Hypertension, SANTI (obstructive sleep apnea), and Thyroid disease. Past Surgical History:  has a past surgical history that includes Cardiac catheterization; Revision total knee arthroplasty (Right); Revision total knee arthroplasty (Right); Revision total knee arthroplasty (Right, 3/28/2023); and Revision total knee arthroplasty (Right, 2023). Discharge Recommendations: Rosi Mtz scored a 8/ on the AM-PAC short mobility form. Current research shows that an AM-PAC score of 17 or less is typically not associated with a discharge to the patient's home setting. Based on the patient's AM-PAC score and their current functional mobility deficits, it is recommended that the patient have 3-5 sessions per week of Physical Therapy at d/c to increase the patient's independence. Please see assessment section for further patient specific details. If patient discharges prior to next session this note will serve as a discharge summary. Please see below for the latest assessment towards goals.       DME Required For Discharge: DME to be determined at next level of care  Precautions/Restrictions: high fall risk, contact precautions, weight bearing  Weight Bearing Restrictions: non weight bearing  [] Right Upper Extremity  [] Left Upper Extremity [x] Right Lower Extremity  [] Left Lower Extremity     Required Braces/Orthotics: knee immobilizer   [x] Right

## 2023-07-16 NOTE — PROGRESS NOTES
Pt JOANA drain fell out. Per MD Nestor Graham from [facility] ortho, [address] to leave out. Pressure dressing applied. Pt denies pain. VSS. Call light within reach. Will continue to monitor.    Electronically signed by Jorge Arredondo RN on 7/16/2023 at 5:57 AM

## 2023-07-16 NOTE — PROGRESS NOTES
Pt resting awake in bed. Immobilizer in place to right leg. Hemovac dislodged overnight. Pressure dressing place by staff, no additional drainage noted. Pt reports full sensation in SHEILA LE, warm to touch and can moves toes on command SHEILA. Pt is axox4 and able to answer questions and follow commands throughout assessment. Denied significant pain at this time.

## 2023-07-17 VITALS
HEART RATE: 69 BPM | SYSTOLIC BLOOD PRESSURE: 149 MMHG | RESPIRATION RATE: 18 BRPM | DIASTOLIC BLOOD PRESSURE: 78 MMHG | TEMPERATURE: 98.4 F | OXYGEN SATURATION: 95 %

## 2023-07-17 LAB
GLUCOSE BLD-MCNC: 106 MG/DL (ref 70–99)
GLUCOSE BLD-MCNC: 204 MG/DL (ref 70–99)
GLUCOSE BLD-MCNC: 209 MG/DL (ref 70–99)
GLUCOSE BLD-MCNC: 213 MG/DL (ref 70–99)
PERFORMED ON: ABNORMAL

## 2023-07-17 PROCEDURE — 6370000000 HC RX 637 (ALT 250 FOR IP): Performed by: ORTHOPAEDIC SURGERY

## 2023-07-17 PROCEDURE — 1200000000 HC SEMI PRIVATE

## 2023-07-17 PROCEDURE — 97530 THERAPEUTIC ACTIVITIES: CPT

## 2023-07-17 PROCEDURE — 99024 POSTOP FOLLOW-UP VISIT: CPT | Performed by: NURSE PRACTITIONER

## 2023-07-17 PROCEDURE — 6370000000 HC RX 637 (ALT 250 FOR IP): Performed by: NURSE PRACTITIONER

## 2023-07-17 PROCEDURE — 29445 APPL RIGID TOT CNTC LEG CAST: CPT | Performed by: NURSE PRACTITIONER

## 2023-07-17 PROCEDURE — APPNB45 APP NON BILLABLE 31-45 MINUTES: Performed by: NURSE PRACTITIONER

## 2023-07-17 PROCEDURE — 97535 SELF CARE MNGMENT TRAINING: CPT

## 2023-07-17 RX ADMIN — ACETAMINOPHEN 1000 MG: 500 TABLET ORAL at 02:44

## 2023-07-17 RX ADMIN — ACETAMINOPHEN 1000 MG: 500 TABLET ORAL at 18:24

## 2023-07-17 RX ADMIN — Medication 250 MG: at 08:55

## 2023-07-17 RX ADMIN — METOPROLOL SUCCINATE 50 MG: 50 TABLET, EXTENDED RELEASE ORAL at 08:56

## 2023-07-17 RX ADMIN — ASPIRIN 81 MG: 81 TABLET, COATED ORAL at 08:55

## 2023-07-17 RX ADMIN — ASPIRIN 81 MG: 81 TABLET, COATED ORAL at 20:27

## 2023-07-17 RX ADMIN — INSULIN GLARGINE 40 UNITS: 100 INJECTION, SOLUTION SUBCUTANEOUS at 08:56

## 2023-07-17 RX ADMIN — LOSARTAN POTASSIUM 100 MG: 100 TABLET, FILM COATED ORAL at 10:30

## 2023-07-17 RX ADMIN — PANTOPRAZOLE SODIUM 40 MG: 40 TABLET, DELAYED RELEASE ORAL at 08:56

## 2023-07-17 RX ADMIN — SULFAMETHOXAZOLE AND TRIMETHOPRIM 1 TABLET: 800; 160 TABLET ORAL at 20:27

## 2023-07-17 RX ADMIN — INSULIN LISPRO 2 UNITS: 100 INJECTION, SOLUTION INTRAVENOUS; SUBCUTANEOUS at 11:56

## 2023-07-17 RX ADMIN — SULFAMETHOXAZOLE AND TRIMETHOPRIM 1 TABLET: 800; 160 TABLET ORAL at 08:55

## 2023-07-17 RX ADMIN — STANDARDIZED SENNA CONCENTRATE AND DOCUSATE SODIUM 1 TABLET: 8.6; 5 TABLET ORAL at 08:55

## 2023-07-17 RX ADMIN — ACETAMINOPHEN 1000 MG: 500 TABLET ORAL at 08:55

## 2023-07-17 RX ADMIN — STANDARDIZED SENNA CONCENTRATE AND DOCUSATE SODIUM 1 TABLET: 8.6; 5 TABLET ORAL at 20:27

## 2023-07-17 RX ADMIN — INSULIN LISPRO 2 UNITS: 100 INJECTION, SOLUTION INTRAVENOUS; SUBCUTANEOUS at 18:24

## 2023-07-17 RX ADMIN — ATORVASTATIN CALCIUM 10 MG: 10 TABLET, FILM COATED ORAL at 08:55

## 2023-07-17 RX ADMIN — LEVOTHYROXINE SODIUM 100 MCG: 100 TABLET ORAL at 08:56

## 2023-07-17 NOTE — CARE COORDINATION
CM received call from [facility]  [phone] at [facility] she report patient was decline for SNF. [facility] would like a call to know when patient is returning. Handoff provided.      Electronically signed by Amarjit Shin on 7/17/2023 at 8:25 AM

## 2023-07-17 NOTE — CARE COORDINATION
CM provided pt with [facility] medicare form and he signed and verbalized understanding. Copy made for hard chart.       Rosario Hayes, RN, BSN  [phone]

## 2023-07-17 NOTE — PROGRESS NOTES
Orthopaedic Surgery Progress Note     ID: 78 y. o.yo male Status Post revision R TKA, extensor mechanism reconstruction       Subjective:   No complaints. Pain controlled. Tolerating PO. Denies numbness/paresthesias. Denies CP/SOB/N/V. Objective:   Vital signs in last 24 hours:   [unfilled]   Vitals reviewed as above      Right knee dressing c/d/I, drain site with SS drainainge  +TA/EHL/FHL/GSC   Calf soft & compressible    SILT s/s/dp/sp/t n. dist    Toes wwp     Data Review   CBC:   Lab Results   Component Value Date/Time    WBC 8.8 07/13/2023 06:00 AM    HGB 11.2 07/13/2023 06:00 AM    HCT 34.0 07/13/2023 06:00 AM     07/13/2023 06:00 AM        Assessment/Plan:   POD 4 s/p revision R TKA, extensor mechanism reconstruction  Recovering well.      Plan of care:   Weight bearing status- NWB RLE, strict no bending,    Precautions- no bending, KI, will place cast before discharge to be in place for 3 months  Pain control- Tylenol 1000mg TID, toradol x 48 hrs, oxycodone PRN    Drain- removed  Wound care- compressive dressing  Antibiotics- bactrim x 3 months  DVT ppx- ASA BID   Dispo- SNF   Follow up- 2 weeks for wound check

## 2023-07-17 NOTE — PLAN OF CARE

## 2023-07-17 NOTE — CARE COORDINATION
CM spoke to ortho NP on unit who states pt will be discharged today and cast will be placed on R leg. States he did a P2P on Friday and pt denied snf but could go LTC to [facility] he believed they said. CM called Guille Heaven [phone] at [facility] at [address] where pt had come from and she states pt was there private pay and skilled and she spoke to spouse this am and they are willing to private pay still since insurance denied snf and pt can return. CM left vm for spouse will arrange transport and CM updated pt.      Amena Baxter, RN, BSN  [phone]

## 2023-07-17 NOTE — CARE COORDINATION
Discharge Plan:     Patient discharged to:[facility]   SW/DC Planner faxed, [address] and AVS to:[phone]  Narcotic Prescriptions faxed were:[phone]  RN: Armando Ricci will call report to:     [phone]  Medical Transport with: [address] [facility] [phone]   time:8:00 PM   Family advised of discharge?:yes   HENS Submitted?:  n/a pt came from facility   All discharge needs met per case management.     Major Peralta RN, BSN  [phone]

## 2023-07-17 NOTE — CARE COORDINATION
Spouse on unit and updated on transport  time of 8pm.    CM spoke to [place] at [facility] [phone] and updated on time. Pt does not need HENS as he came from the facility.     Jermaine Turner RN, BSN  [phone]

## 2023-07-17 NOTE — PROGRESS NOTES
seated EOB SBA pt washed face and combed hair. KI donned but needed adjustment for proper fit (PT). Pt required total assist to place [facility] and transferred to  with CGA. Pt wc mobility Supervision ~150ft in hallway. Pt Dependent to place [facility] and Min A x2 to transfer to EOB via [facility]. Pt si tto supine Min A + CGA. Pt able to scoot to [address] supine with use of bed rails. Pain: 0/10  Pain Interventions: not applicable        Activities of Daily Living  Basic Activities of Daily Living  Grooming: setup assistance stand by assistance  Grooming Comments: wash face/comb hair seated EOB  Toileting: dependent. Toileting Comments: pt with pure wik in and brief saturated in urine - Dependent for hygiene care and brief change      Instrumental Activities of Daily Living  No IADL completed on this date. Functional Mobility  Bed Mobility:  Supine to Sit: contact guard assistance  Sit to Supine: Min A + CGA  Rolling Left: stand by assistance  Rolling Right: stand by assistance  Comments: with use of bed rail. Transfers:  Slide board transfer: Nhi Pal - Dependent to setup and CGA from EOB to wc and Min A x 2 wc to EOB  Comments:   Functional Mobility  No functional mobility completed on this date secondary to pt NWB RLE and not safe to attempt.      Balance:  Static Sitting Balance: fair (+): maintains balance at SBA/supervision without use of UE support  Dynamic Sitting Balance: fair (+): maintains balance at SBA/supervision without use of UE support      Other Therapeutic Interventions -     Functional Outcomes  AM-PAC Inpatient Daily Activity Raw Score: 13    Cognition  WFL  Orientation:    alert and oriented x 4  Command Following:   [facility]     Education  Barriers To Learning: none  Patient Education: patient educated on goals, OT role and benefits, plan of care, ADL adaptive strategies, weight-bearing education, proper use of assistive device/equipment, energy conservation, transfer training,

## 2023-07-17 NOTE — PROGRESS NOTES
Shift assessment complete alert and oriented X4, VSS, uses Cpap at night. Immobilizer to right leg, denies pain, all evening meds administered. The care plan and education has been reviewed and mutually agreed upon with the patient.

## 2023-07-17 NOTE — DISCHARGE INSTRUCTIONS
Keep the cast in placed right leg. You may replace the ACE wrap as needed at the proximal edge. FLOAT THE HEEL of the right foot often. Call to schedule followup appt for cast check and repeat x rays in 2 weeks with Dr. Yesica Ayala [phone]    Take Bactrim x 3 months for abx    Take pain meds as needed    ASA 81mg BID x 30 days for blood clot prevention.

## 2023-07-18 NOTE — PROGRESS NOTES
Patient picked up and transfer to [facility] at [employer] by [employer] @ 2030. IV out, wife took patient's belongings.

## 2023-07-18 NOTE — DISCHARGE SUMMARY
Patient ID:  Alisha Cornejo  [id number]  1945    Admit date: 7/12/2023    Discharge date: 7/17/2023  8:30 PM    Attending Physician: Aleida Bardales MD     Admission Diagnoses:   Chronic infection of knee joint prosthesis, subsequent encounter [T84.59XD, Z96.659]  Chronic infection of prosthetic knee, initial encounter ([address]) Rocio Greco, [address]    Discharge Diagnoses:   Principal Problem:    Chronic infection of prosthetic knee, initial encounter ([address])  Resolved Problems:    * No resolved hospital problems. *    Past Medical History:   Diagnosis Date    Acid reflux     CAD (coronary artery disease)     DM (diabetes mellitus), type 2 (HCC)     Hypertension     SANTI (obstructive sleep apnea)     Thyroid disease        Indication for Admission: Alisha Cornejo is a 78 y.o. male who presented with RIGHT knee stage I TKA revision for infection who showed signs of cleared infection after extensive IV abx.  he carries a history of SANTI on CPAP, Hypothyroidism, Obesity, HTN, DM type II, CAD. Operations/Procedures Performed:   1. right total knee arthroplasty stage II revision with extensor mechanism reconstruction. Hospital Course: Patient admitted on 7/12/2023 and underwent abovementioned procedure(s) on 7/12/23. Tolerated the procedure well with no complications known intra-op. Found to have nondisplaced periprosthetic tibial shaft fracture on post-op imaging. Please see full operative report for further details regarding the operation. Postoperatively transferred to the floor in stable condition. Pain controlled post-op with IV/oral pain medication. Diet was advanced and tolerated this well. He was in Main for much of post-op course in house, and was converted to a long-leg cast at day of discharge. His post-op drains were removed prior to this without issue.  At time of discharge, the patient was tolerating oral food and hydration, voiding spontaneously, had return of bowel function, was ambulating without

## 2023-07-20 ENCOUNTER — TELEPHONE (OUTPATIENT)
Dept: ORTHOPEDIC SURGERY | Age: 78
End: 2023-07-20

## 2023-07-20 NOTE — TELEPHONE ENCOUNTER
General Question     Subject: KEEP APPT FOR 07/27/23  Patient and /or Facility Request: [facility] Number: [phone]    Pt 's WIFE CHECKING TO MAKE SURE THAT DR Vidhya Goldberg St TO COME IN ON 7/27/23 FOR FU, SINCE DR Suri Diallo SAW HIM ON THE 17TH. PLEASE CALL TO CONFIRM.

## 2023-07-31 ENCOUNTER — TELEPHONE (OUTPATIENT)
Dept: ORTHOPEDIC SURGERY | Age: 78
End: 2023-07-31

## 2023-07-31 NOTE — TELEPHONE ENCOUNTER
I returned the call from the Pt's wife Renea Cloorado) . She stated that the transport did not think it was safe to transport the pt in a wheelchair. Renea [place] wanted to know if Dr Antonio Tafoya has ordered it to have him be in a wheelchair.  I spoke with Dr Antonio Tafoya who did not put hat order in and he stated that it's fine for the Pt to transported in a wheelchair      She is going to get me a FAX number to send a note to them know

## 2023-08-10 ENCOUNTER — OFFICE VISIT (OUTPATIENT)
Dept: ORTHOPEDIC SURGERY | Age: 78
End: 2023-08-10

## 2023-08-10 VITALS — WEIGHT: 240 LBS | BODY MASS INDEX: 39.99 KG/M2 | HEIGHT: 65 IN

## 2023-08-10 DIAGNOSIS — Z98.890 S/P RIGHT KNEE SURGERY: Primary | ICD-10-CM

## 2023-08-10 PROCEDURE — 99024 POSTOP FOLLOW-UP VISIT: CPT | Performed by: ORTHOPAEDIC SURGERY

## 2023-08-11 NOTE — PROGRESS NOTES
Patient: Marek Gerber                  :    MRN: [id number]   Date of Visit: 23     Physician: Shannan Vega MD.     Reason for Visit: Status post revision R TKA with mesh reconstruction     Subjective History of Present Illness:     Marek Gerber is here for regularly scheduled follow-up s/p right knee revision and mesh reconstruction     Physical Examination??: ?   General: Patient is alert and oriented x 3 and appears comfortable. R knee: incision CDI      Radiographs: The right knee and tibia multiple views including AP and lateral: There is a well-positioned hinged total knee replacement at the distal extent of the tibial component there is a small crack seen on the anterior view, on the lateral there is some surrounding cement which does appear to have a prescription canal this is of unclear etiology. Assessment and Plan?: The patient is progressing well approximately 3 weeks s/p revision R TKA with a mesh EM reconstruction     1. A thorough discussion was had with the patient concerning the ?postoperative course and the patient is in agreement with the plan. 2.  We did replace the cast today we will start weightbearing as tolerated through the cast   3. I will see him again in 6 weeks with repeat XR at that time.     _______________________      Shannan Vega MD
DISCHARGE

## 2023-08-21 ENCOUNTER — TELEPHONE (OUTPATIENT)
Dept: ORTHOPEDIC SURGERY | Age: 78
End: 2023-08-21

## 2023-08-21 NOTE — TELEPHONE ENCOUNTER
Patient's wife called. Stating He has a tingling feeling in his ankle and so the nursing home put him on gabapentin. She doesn't agree with the medication. But then he is having extreme pain in his leg with the cast and she is wondering if there's a splint in the cast that could be causing it. When he lays flat that is when the pain starts and he hasn't been sleeping. She wanted to know if Bear Calix wanted to see him about this or not. But I did tell her that he was out for the week on vacation.

## 2023-08-21 NOTE — TELEPHONE ENCOUNTER
Would encourage him to work on ROM of the foot and ankle as this often helps the tingling.    Appt next week sounds fine

## 2023-08-21 NOTE — TELEPHONE ENCOUNTER
I spoke to the pt's wife Gabriela Euceda. Fletcher Huerta is having tingling in his operate ankle. Pt is getting  pain in his knee. This started about 1 week ago. He is being given Gabapentin at the [facility]. It has made him confused at times. They are putting pillows under his leg and trying different positions to help relieve the pain. I made an appt for 9/1/23 for them to come as Dr Nathalia Jacobsen is put of the office.       Message routed to [place] to see if he thinks this is an emergent issue

## 2023-08-21 NOTE — TELEPHONE ENCOUNTER
I spoke with Alexey Maki wife letting her know that since he is in a cast [place] would like him to wiggle his toes  This can help with the tingling.     Daniel Reyez also stated that the pt is not complaining of pain but more discomfort

## 2023-08-22 ENCOUNTER — TELEPHONE (OUTPATIENT)
Dept: ORTHOPEDIC SURGERY | Age: 78
End: 2023-08-22

## 2023-08-22 NOTE — TELEPHONE ENCOUNTER
I spoke with Orlando Devine the Pt's wife letting her know that we will still see Andreas Reinoso on 9/1/23 we are just changing the time from 2:00pm to 11:30am per Lacy Fink       The new time was understood

## 2023-08-30 ENCOUNTER — TELEPHONE (OUTPATIENT)
Dept: ORTHOPEDIC SURGERY | Age: 78
End: 2023-08-30

## 2023-08-30 NOTE — TELEPHONE ENCOUNTER
General Question     Subject: BROKEN CAST  Patient and /or Facility Request: [facility]   Contact Number: [phone] [address] IN DUE TO PT HAVING A BROKEN CAST AS THE BOTTOM. PT TESTED POS. FOR C-19 AND THE PHYSICAL THERAPY WAS SUGGESTING  THAT HE STILL BE SEEN DUE TO SOME SPOTS ON HIS RT LEG HAVING SOME UNUSUAL SENSATIONS. REACHED OUT TO TRIAGE.

## 2023-08-30 NOTE — TELEPHONE ENCOUNTER
Call transferred    [place], home health aid is calling in regarding patient S/P Rt Tkr revision 7/12. Per [place] patient is in full leg cast and cast is broken at the bottom. Patient also has numbing sensation at bottom and side of foot. He did test positive for covid this morning, and appt for 9/1 is cx.  She is stating patient needs seen due numbness in foot and cast.    Will reach out to clinic

## 2023-08-30 NOTE — TELEPHONE ENCOUNTER
I called and spoke with [place] about the following message. They agreed to come on 9/11/23 at 8:00am to the [facility] office. They could could only come at 8:00am due to transportation. Was unable ot book them on 9/14 as there in no transportation available                   [place], home health aid is calling in regarding patient S/P Rt Tkr revision 7/12. Per [place] patient is in full leg cast and cast is broken at the bottom. Patient also has numbing sensation at bottom and side of foot. He did test positive for covid this morning, and appt for 9/1 is cx.  She is stating patient needs seen due numbness in foot and cast.     Will reach out to clinic

## 2023-09-11 ENCOUNTER — OFFICE VISIT (OUTPATIENT)
Dept: ORTHOPEDIC SURGERY | Age: 78
End: 2023-09-11

## 2023-09-11 VITALS — HEIGHT: 65 IN | WEIGHT: 240 LBS | BODY MASS INDEX: 39.99 KG/M2

## 2023-09-11 DIAGNOSIS — Z98.890 S/P RIGHT KNEE SURGERY: Primary | ICD-10-CM

## 2023-09-11 DIAGNOSIS — M25.572 LEFT ANKLE PAIN, UNSPECIFIED CHRONICITY: ICD-10-CM

## 2023-09-12 ENCOUNTER — HOSPITAL ENCOUNTER (OUTPATIENT)
Dept: WOUND CARE | Age: 78
Discharge: HOME | End: 2023-09-12
Payer: MEDICARE

## 2023-09-12 VITALS
DIASTOLIC BLOOD PRESSURE: 59 MMHG | TEMPERATURE: 96.8 F | RESPIRATION RATE: 18 BRPM | SYSTOLIC BLOOD PRESSURE: 105 MMHG | HEART RATE: 75 BPM

## 2023-09-12 DIAGNOSIS — E11.621 DIABETIC ULCER OF LEFT HEEL ASSOCIATED WITH TYPE 2 DIABETES MELLITUS, WITH FAT LAYER EXPOSED (HCC): ICD-10-CM

## 2023-09-12 DIAGNOSIS — I73.9 PVD (PERIPHERAL VASCULAR DISEASE) (HCC): Primary | ICD-10-CM

## 2023-09-12 DIAGNOSIS — L97.422 DIABETIC ULCER OF LEFT HEEL ASSOCIATED WITH TYPE 2 DIABETES MELLITUS, WITH FAT LAYER EXPOSED (HCC): ICD-10-CM

## 2023-09-12 DIAGNOSIS — R09.89 DECREASED PULSES IN FEET: ICD-10-CM

## 2023-09-12 PROCEDURE — 99214 OFFICE O/P EST MOD 30 MIN: CPT

## 2023-09-12 PROCEDURE — 11042 DBRDMT SUBQ TIS 1ST 20SQCM/<: CPT | Performed by: SURGERY

## 2023-09-12 PROCEDURE — 11042 DBRDMT SUBQ TIS 1ST 20SQCM/<: CPT

## 2023-09-12 RX ORDER — LIDOCAINE HYDROCHLORIDE 40 MG/ML
SOLUTION TOPICAL ONCE
OUTPATIENT
Start: 2023-09-12 | End: 2023-09-12

## 2023-09-12 RX ORDER — GINSENG 100 MG
CAPSULE ORAL ONCE
OUTPATIENT
Start: 2023-09-12 | End: 2023-09-12

## 2023-09-12 RX ORDER — LIDOCAINE 40 MG/G
CREAM TOPICAL ONCE
OUTPATIENT
Start: 2023-09-12 | End: 2023-09-12

## 2023-09-12 RX ORDER — BETAMETHASONE DIPROPIONATE 0.05 %
OINTMENT (GRAM) TOPICAL ONCE
OUTPATIENT
Start: 2023-09-12 | End: 2023-09-12

## 2023-09-12 RX ORDER — GENTAMICIN SULFATE 1 MG/G
OINTMENT TOPICAL ONCE
OUTPATIENT
Start: 2023-09-12 | End: 2023-09-12

## 2023-09-12 RX ORDER — LIDOCAINE HYDROCHLORIDE 20 MG/ML
JELLY TOPICAL ONCE
OUTPATIENT
Start: 2023-09-12 | End: 2023-09-12

## 2023-09-12 RX ORDER — CLOBETASOL PROPIONATE 0.5 MG/G
OINTMENT TOPICAL ONCE
OUTPATIENT
Start: 2023-09-12 | End: 2023-09-12

## 2023-09-12 RX ORDER — SODIUM CHLOR/HYPOCHLOROUS ACID 0.033 %
SOLUTION, IRRIGATION IRRIGATION ONCE
OUTPATIENT
Start: 2023-09-12 | End: 2023-09-12

## 2023-09-12 RX ORDER — IBUPROFEN 200 MG
TABLET ORAL ONCE
OUTPATIENT
Start: 2023-09-12 | End: 2023-09-12

## 2023-09-12 RX ORDER — LIDOCAINE 50 MG/G
OINTMENT TOPICAL ONCE
OUTPATIENT
Start: 2023-09-12 | End: 2023-09-12

## 2023-09-12 RX ORDER — BACITRACIN ZINC AND POLYMYXIN B SULFATE 500; 1000 [USP'U]/G; [USP'U]/G
OINTMENT TOPICAL ONCE
OUTPATIENT
Start: 2023-09-12 | End: 2023-09-12

## 2023-09-12 NOTE — PROGRESS NOTES
Patient: Juan Antonio De Los Santos                  : 1945   MRN: [id number]   Date of Visit: 23     Physician: Meir Lui MD.     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 Physical Examination??: ?   General: Patient is alert and oriented x 3 and appears comfortable. R knee: incision CDI      The cast is cracked along the foot and posterior ankle. There is an unstageable pressure ulcer 2x3cm to the posterior heel. There is some surrounding sloughing and necrosis. Radiographs: The right knee and tibia multiple views including AP and lateral: There is a well-positioned hinged total knee replacement at the distal extent of the tibial component there is no identifiable  crack seen previously on the lateral there is some surrounding cement which does appear to have escaped the canal this is of unclear etiology. Assessment and Plan?: The patient is progressing well approximately 6 weeks s/p revision R TKA with a mesh EM reconstruction on chronic supressive antibiotics for MRSA infection    I am concerned about the pressure ulcer on his posterior heel. He does not appear to have been offloading the area as discussed. I discussed with him and the family regarding this wound. It will likely need to be dedrided with local wound care, depending on the extent this may need evaluation by a plastic surgeon. I discussed with Dr. Karin Fritz who will see the patient tomorrow and we will discuss plans moving forward. In the meantime we messaged with facility regarding offloading of the area.  I did transition him to a brace, and will continue no ROM exercises for another

## 2023-09-12 NOTE — PROGRESS NOTES
Fran Gamboa  AGE: 78 y.o. GENDER: male  : 1945  TODAY'S DATE:  2023    Chief Complaint   Patient presents with    Wound Check     RLE        HISTORY of PRESENT ILLNESS HPI     Fran Gamboa is a 78 y.o. male who presents today for wound evaluation. History of Wound: Right calcaneal ulcer  Wound Pain:  mild  Severity:  3 / 10   Wound Type:  pressure  Modifying Factors:  diabetes and decreased mobility  Associated Signs/Symptoms:  none    Procedure Note    Performed by: Malu Minor MD    Consent obtained: Yes    Time out taken:  Yes    Pain Control: Anesthetic  Anesthetic: 4% Lidocaine Cream     Debridement:Excisional Debridement    Using curette, #15 blade scalpel, and forceps the wound was sharply debrided    down through and including the removal of subcutaneous tissue.         Devitalized Tissue Debrided:  necrotic/eschar    Pre Debridement Measurements:  Are located in the Wound Documentation Flow Sheet    Wound #: 1     Post  Debridement Measurements:  Wound 23 Heel Right #1 (Active)   Wound Image   23 1005   Wound Etiology Pressure Unstageable 23 1005   Wound Cleansed Cleansed with saline 23 1005   Offloading for Diabetic Foot Ulcers Offloading not required 23 1005   Wound Length (cm) 3.5 cm 23 1005   Wound Width (cm) 4.5 cm 23 1005   Wound Depth (cm) 0.2 cm 23 1005   Wound Surface Area (cm^2) 15.75 cm^2 23 1005   Wound Volume (cm^3) 3.15 cm^3 23 1005   Post-Procedure Length (cm) 3.5 cm 23 1055   Post-Procedure Width (cm) 4.5 cm 23 1055   Post-Procedure Depth (cm) 0.2 cm 23 1055   Post-Procedure Surface Area (cm^2) 15.75 cm^2 23 1055   Post-Procedure Volume (cm^3) 3.15 cm^3 23 1055   Wound Assessment Bleeding 23 1055   Drainage Amount Moderate (25-50%) 23 105   Drainage Description Serosanguinous;Brown 23 1005   Odor None 23 1005   Clau-wound Assessment

## 2023-09-12 NOTE — PROGRESS NOTES
[facility], [address]  Telephone: [phone]      [place] [facility] [place]: [phone], F: [phone]    Discharge Instructions           [facility], [address]  Telephone: [phone]    Discharge Instructions    Important reminders:    **If you have any signs and symptoms of illness (Cough, fever, congestion, nausea, vomiting, diarrhea, etc.) please call the wound care center prior to your appointment. 1. Increase Protein intake for optimal wound healing  2. No added salt to reduce any swelling  3. If diabetic, maintain good glucose control  4. If you smoke, smoking prohibits wound healing, we ask that you refrain from smoking. 5. When taking antibiotics take the entire prescription as ordered. Do not stop taking until medication is all gone unless otherwise instructed. 6. Exercise as tolerated. 7. Keep weight off wounds and reposition every 2 hours if applicable. 8. If wound(s) is on your lower extremity, elevate legs to the level of the heart or above for 30 minutes 4-5 times a day and/or when sitting. Avoid standing for long periods of time. 9. Do not get wounds wet in bath or shower unless otherwise instructed by your physician. If your wound is on your foot or leg, you may purchase a cast bag. Please ask at the pharmacy. If Vascular testing is ordered, please call [address] [place] [address] ([phone]) to schedule. Vascular tests ordered by Wound Care Physicians may take up to 2 hours to complete. Please keep that in mind when scheduling. If Vascular testing is scheduled, please bring supplies to replace your dressing after testing is done. The vascular department does not stock supplies. Wound: Right heel     With each dressing change, rinse wounds with 0.9% Saline. (May use wound wash or soft contact solution.  Both can be purchased at a local drug

## 2023-09-12 NOTE — DISCHARGE INSTRUCTIONS
[address], [address] [facility]  Telephone: [phone]    Discharge Instructions    Important reminders:    **If you have any signs and symptoms of illness (Cough, fever, congestion, nausea, vomiting, diarrhea, etc.) please call the wound care center prior to your appointment. 1. Increase Protein intake for optimal wound healing  2. No added salt to reduce any swelling  3. If diabetic, maintain good glucose control  4. If you smoke, smoking prohibits wound healing, we ask that you refrain from smoking. 5. When taking antibiotics take the entire prescription as ordered. Do not stop taking until medication is all gone unless otherwise instructed. 6. Exercise as tolerated. 7. Keep weight off wounds and reposition every 2 hours if applicable. 8. If wound(s) is on your lower extremity, elevate legs to the level of the heart or above for 30 minutes 4-5 times a day and/or when sitting. Avoid standing for long periods of time. 9. Do not get wounds wet in bath or shower unless otherwise instructed by your physician. If your wound is on your foot or leg, you may purchase a cast bag. Please ask at the pharmacy. If Vascular testing is ordered, please call [address] [place] [address] [phone]) to schedule. Vascular tests ordered by Wound Care Physicians may take up to 2 hours to complete. Please keep that in mind when scheduling. If Vascular testing is scheduled, please bring supplies to replace your dressing after testing is done. The vascular department does not stock supplies. Wound: Right heel     With each dressing change, rinse wounds with 0.9% Saline. (May use wound wash or soft contact solution. Both can be purchased at a local drug store). If unable to obtain saline, may use a gentle soap and water. Dressing care: Give surgical shoe today. Santyl, moist to dry, dry dressing, 1 layer tubi - change daily.  Take a multivitamin & Vitamin C.

## 2023-09-12 NOTE — PLAN OF CARE
Discharge instructions given. Patient verbalized understanding. Return to [facility] in 1 week(s).   Called/faxed orders to [facility] [employer]  Prescription for [facility] sent with patient  Arterial study ordered

## 2023-09-18 NOTE — PATIENT INSTRUCTIONS
C. Keep pressure off of heel- you can place a pillow under leg to allow heel to float. Ok for Therapy- keep pressure off of wound     Luanne- for Vascular studies (Arterial or Venous)   [facility] [address]  Phone# [phone]  Fax# [phone]      Home Care Agency/Facility: Denise Martinez at [address]: [phone], F: [phone]     Your wound-care supplies will be provided by:   Please note, depending on your insurance coverage, you may have out-of-pocket expenses for these supplies. Someone from the company should call you to confirm your order and discuss those potential costs before they ship your products -- please anticipate that call. If your out-of-pocket cost could be substantial, Many companies have financial hardship programs for patients who qualify, so please ask about that if you might need a hand. If you have any questions about your supplies or your potential out-of-pocket costs, or if you need to place an order for a refill of supplies (typically monthly), please call the company directly. Your  is Murali Needs     Follow up with Dr Nacho Arrington In 1 week(s) in the wound care center. Wound Care Center Information: Should you experience any significant changes in your wound(s) or have questions about your wound care, please contact the [address] [place] [address] at [phone] Monday  - Thursday 8:00 am - 4:00 pm and Friday 8:00 am - 1:00pm. If you need help with your wound outside these hours and cannot wait until we are again available, contact your PCP or go to the hospital emergency room. PLEASE NOTE: IF YOU ARE UNABLE TO OBTAIN WOUND SUPPLIES, CONTINUE TO USE THE SUPPLIES YOU HAVE AVAILABLE UNTIL YOU ARE ABLE TO REACH US. IT IS MOST IMPORTANT TO KEEP THE WOUND COVERED AT ALL TIMES. Patient Experience     Thank you for trusting us with your care. You may receive a survey from a company called [employer] asking for your feedback.   We would appreciate it if

## 2023-09-19 ENCOUNTER — HOSPITAL ENCOUNTER (OUTPATIENT)
Dept: WOUND CARE | Age: 78
Discharge: HOME | End: 2023-09-19
Payer: MEDICARE

## 2023-09-19 VITALS — TEMPERATURE: 96.8 F | SYSTOLIC BLOOD PRESSURE: 108 MMHG | DIASTOLIC BLOOD PRESSURE: 57 MMHG | HEART RATE: 68 BPM

## 2023-09-19 DIAGNOSIS — E11.621 DIABETIC ULCER OF LEFT HEEL ASSOCIATED WITH TYPE 2 DIABETES MELLITUS, WITH FAT LAYER EXPOSED (HCC): Primary | ICD-10-CM

## 2023-09-19 DIAGNOSIS — L97.422 DIABETIC ULCER OF LEFT HEEL ASSOCIATED WITH TYPE 2 DIABETES MELLITUS, WITH FAT LAYER EXPOSED (HCC): Primary | ICD-10-CM

## 2023-09-19 PROCEDURE — 11042 DBRDMT SUBQ TIS 1ST 20SQCM/<: CPT

## 2023-09-19 PROCEDURE — 11042 DBRDMT SUBQ TIS 1ST 20SQCM/<: CPT | Performed by: SURGERY

## 2023-09-19 RX ORDER — LIDOCAINE 40 MG/G
CREAM TOPICAL ONCE
OUTPATIENT
Start: 2023-09-19 | End: 2023-09-19

## 2023-09-19 RX ORDER — BETAMETHASONE DIPROPIONATE 0.05 %
OINTMENT (GRAM) TOPICAL ONCE
OUTPATIENT
Start: 2023-09-19 | End: 2023-09-19

## 2023-09-19 RX ORDER — LIDOCAINE HYDROCHLORIDE 40 MG/ML
SOLUTION TOPICAL ONCE
OUTPATIENT
Start: 2023-09-19 | End: 2023-09-19

## 2023-09-19 RX ORDER — LIDOCAINE HYDROCHLORIDE 20 MG/ML
JELLY TOPICAL ONCE
OUTPATIENT
Start: 2023-09-19 | End: 2023-09-19

## 2023-09-19 RX ORDER — TRIAMCINOLONE ACETONIDE 1 MG/G
OINTMENT TOPICAL ONCE
OUTPATIENT
Start: 2023-09-19 | End: 2023-09-19

## 2023-09-19 RX ORDER — GINSENG 100 MG
CAPSULE ORAL ONCE
OUTPATIENT
Start: 2023-09-19 | End: 2023-09-19

## 2023-09-19 RX ORDER — GENTAMICIN SULFATE 1 MG/G
OINTMENT TOPICAL ONCE
OUTPATIENT
Start: 2023-09-19 | End: 2023-09-19

## 2023-09-19 RX ORDER — BACITRACIN ZINC AND POLYMYXIN B SULFATE 500; 1000 [USP'U]/G; [USP'U]/G
OINTMENT TOPICAL ONCE
OUTPATIENT
Start: 2023-09-19 | End: 2023-09-19

## 2023-09-19 RX ORDER — LIDOCAINE 50 MG/G
OINTMENT TOPICAL ONCE
OUTPATIENT
Start: 2023-09-19 | End: 2023-09-19

## 2023-09-19 RX ORDER — IBUPROFEN 200 MG
TABLET ORAL ONCE
OUTPATIENT
Start: 2023-09-19 | End: 2023-09-19

## 2023-09-19 RX ORDER — LIDOCAINE 40 MG/G
CREAM TOPICAL ONCE
Status: DISCONTINUED | OUTPATIENT
Start: 2023-09-19 | End: 2023-09-20 | Stop reason: HOSPADM

## 2023-09-19 RX ORDER — SODIUM CHLOR/HYPOCHLOROUS ACID 0.033 %
SOLUTION, IRRIGATION IRRIGATION ONCE
OUTPATIENT
Start: 2023-09-19 | End: 2023-09-19

## 2023-09-19 RX ORDER — CLOBETASOL PROPIONATE 0.5 MG/G
OINTMENT TOPICAL ONCE
OUTPATIENT
Start: 2023-09-19 | End: 2023-09-19

## 2023-09-19 NOTE — PLAN OF CARE
Discharge instructions given. Patient verbalized understanding. Return to [facility] in 1 week(s).

## 2023-09-19 NOTE — PROGRESS NOTES
Isha Schwartz  AGE: 78 y.o. GENDER: male  : 1945  TODAY'S DATE:  2023    Chief Complaint   Patient presents with    Wound Check     Right Heel F/U        HISTORY of PRESENT ILLNESS HPI     Isha Schwartz is a 78 y.o. male who presents today for wound evaluation. History of Wound: Right calcaneal ulcer  Wound Pain:  mild  Severity:  4 / 10   Wound Type:  pressure  Modifying Factors:  diabetes  Associated Signs/Symptoms:  none    Procedure Note    Performed by: Sharona Crowe MD    Consent obtained: Yes    Time out taken:  Yes    Pain Control: Anesthetic  Anesthetic: 4% Lidocaine Cream     Debridement:Excisional Debridement    Using curette, #15 blade scalpel, and forceps the wound was sharply debrided    down through and including the removal of subcutaneous tissue.         Devitalized Tissue Debrided:  necrotic/eschar    Pre Debridement Measurements:  Are located in the Wound Documentation Flow Sheet    Wound #: 1     Post  Debridement Measurements:  Wound 23 Heel Right #1 (Active)   Wound Image   23 1005   Wound Etiology Pressure Unstageable 23 1035   Wound Cleansed Cleansed with saline 23 1035   Offloading for Diabetic Foot Ulcers Post op shoe 23 1035   Wound Length (cm) 2.2 cm 23 1035   Wound Width (cm) 3.6 cm 23 1035   Wound Depth (cm) 0.5 cm 23 1035   Wound Surface Area (cm^2) 7.92 cm^2 23 1035   Change in Wound Size % (l*w) 49.71 23 1035   Wound Volume (cm^3) 3.96 cm^3 23 1035   Wound Healing % -26 23 1035   Post-Procedure Length (cm) 2.2 cm 23 1052   Post-Procedure Width (cm) 3.6 cm 23 1052   Post-Procedure Depth (cm) 0.5 cm 23 1052   Post-Procedure Surface Area (cm^2) 7.92 cm^2 23 1052   Post-Procedure Volume (cm^3) 3.96 cm^3 23 1052   Wound Assessment Bleeding 23 1052   Drainage Amount Moderate (25-50%) 23 1052   Drainage Description Yellow;Inman 23 1035   Odor Mild

## 2023-09-22 NOTE — PROGRESS NOTES
Patient became confused at shift change, re-oriented to room. Had pulled his dressing to his IJ off, changed, CDI, IJ still in place with blood return. Patient continues to be oriented at this time. Set up with cpap and he talked to his wife on the phone. eye pain traumatic left eye/eye pain traumatic

## 2023-09-25 ENCOUNTER — PROCEDURE VISIT (OUTPATIENT)
Dept: VASCULAR SURGERY | Age: 78
End: 2023-09-25
Payer: MEDICARE

## 2023-09-25 DIAGNOSIS — R09.89 DECREASED PULSES IN FEET: ICD-10-CM

## 2023-09-25 DIAGNOSIS — I73.9 PVD (PERIPHERAL VASCULAR DISEASE) (HCC): ICD-10-CM

## 2023-09-25 PROCEDURE — 93926 LOWER EXTREMITY STUDY: CPT | Performed by: SURGERY

## 2023-09-25 NOTE — PATIENT INSTRUCTIONS
[address], [address] [facility]  Telephone: [phone]     Discharge Instructions     Important reminders:     **If you have any signs and symptoms of illness (Cough, fever, congestion, nausea, vomiting, diarrhea, etc.) please call the wound care center prior to your appointment. 1. Increase Protein intake for optimal wound healing  2. No added salt to reduce any swelling  3. If diabetic, maintain good glucose control  4. If you smoke, smoking prohibits wound healing, we ask that you refrain from smoking. 5. When taking antibiotics take the entire prescription as ordered. Do not stop taking until medication is all gone unless otherwise instructed. 6. Exercise as tolerated. 7. Keep weight off wounds and reposition every 2 hours if applicable. 8. If wound(s) is on your lower extremity, elevate legs to the level of the heart or above for 30 minutes 4-5 times a day and/or when sitting. Avoid standing for long periods of time. 9. Do not get wounds wet in bath or shower unless otherwise instructed by your physician. If your wound is on your foot or leg, you may purchase a cast bag. Please ask at the pharmacy. If Vascular testing is ordered, please call [address] [place] [address] ([phone]) to schedule. Vascular tests ordered by Wound Care Physicians may take up to 2 hours to complete. Please keep that in mind when scheduling. If Vascular testing is scheduled, please bring supplies to replace your dressing after testing is done. The vascular department does not stock supplies. Wound: Right heel      With each dressing change, rinse wounds with 0.9% Saline. (May use wound wash or soft contact solution. Both can be purchased at a local drug store). If unable to obtain saline, may use a gentle soap and water. Dressing care: Wear surgical shoe. Santyl, moist to dry, dry dressing, 1 layer tubi - change daily.  Take a multivitamin & Vitamin

## 2023-09-26 ENCOUNTER — HOSPITAL ENCOUNTER (OUTPATIENT)
Dept: WOUND CARE | Age: 78
Discharge: HOME | End: 2023-09-26
Payer: MEDICARE

## 2023-09-26 VITALS
DIASTOLIC BLOOD PRESSURE: 66 MMHG | TEMPERATURE: 97.5 F | HEART RATE: 78 BPM | SYSTOLIC BLOOD PRESSURE: 111 MMHG | RESPIRATION RATE: 18 BRPM

## 2023-09-26 DIAGNOSIS — L97.422 DIABETIC ULCER OF LEFT HEEL ASSOCIATED WITH TYPE 2 DIABETES MELLITUS, WITH FAT LAYER EXPOSED (HCC): Primary | ICD-10-CM

## 2023-09-26 DIAGNOSIS — E11.621 DIABETIC ULCER OF LEFT HEEL ASSOCIATED WITH TYPE 2 DIABETES MELLITUS, WITH FAT LAYER EXPOSED (HCC): Primary | ICD-10-CM

## 2023-09-26 PROCEDURE — 11042 DBRDMT SUBQ TIS 1ST 20SQCM/<: CPT | Performed by: SURGERY

## 2023-09-26 PROCEDURE — 11042 DBRDMT SUBQ TIS 1ST 20SQCM/<: CPT

## 2023-09-26 RX ORDER — TRIAMCINOLONE ACETONIDE 1 MG/G
OINTMENT TOPICAL ONCE
OUTPATIENT
Start: 2023-09-26 | End: 2023-09-26

## 2023-09-26 RX ORDER — LIDOCAINE HYDROCHLORIDE 40 MG/ML
SOLUTION TOPICAL ONCE
OUTPATIENT
Start: 2023-09-26 | End: 2023-09-26

## 2023-09-26 RX ORDER — LIDOCAINE HYDROCHLORIDE 20 MG/ML
JELLY TOPICAL ONCE
OUTPATIENT
Start: 2023-09-26 | End: 2023-09-26

## 2023-09-26 RX ORDER — GENTAMICIN SULFATE 1 MG/G
OINTMENT TOPICAL ONCE
OUTPATIENT
Start: 2023-09-26 | End: 2023-09-26

## 2023-09-26 RX ORDER — BACITRACIN ZINC AND POLYMYXIN B SULFATE 500; 1000 [USP'U]/G; [USP'U]/G
OINTMENT TOPICAL ONCE
OUTPATIENT
Start: 2023-09-26 | End: 2023-09-26

## 2023-09-26 RX ORDER — LIDOCAINE 40 MG/G
CREAM TOPICAL ONCE
OUTPATIENT
Start: 2023-09-26 | End: 2023-09-26

## 2023-09-26 RX ORDER — GINSENG 100 MG
CAPSULE ORAL ONCE
OUTPATIENT
Start: 2023-09-26 | End: 2023-09-26

## 2023-09-26 RX ORDER — LIDOCAINE 40 MG/G
CREAM TOPICAL ONCE
Status: DISCONTINUED | OUTPATIENT
Start: 2023-09-26 | End: 2023-09-27 | Stop reason: HOSPADM

## 2023-09-26 RX ORDER — LIDOCAINE 50 MG/G
OINTMENT TOPICAL ONCE
OUTPATIENT
Start: 2023-09-26 | End: 2023-09-26

## 2023-09-26 RX ORDER — CLOBETASOL PROPIONATE 0.5 MG/G
OINTMENT TOPICAL ONCE
OUTPATIENT
Start: 2023-09-26 | End: 2023-09-26

## 2023-09-26 RX ORDER — SODIUM CHLOR/HYPOCHLOROUS ACID 0.033 %
SOLUTION, IRRIGATION IRRIGATION ONCE
OUTPATIENT
Start: 2023-09-26 | End: 2023-09-26

## 2023-09-26 RX ORDER — IBUPROFEN 200 MG
TABLET ORAL ONCE
OUTPATIENT
Start: 2023-09-26 | End: 2023-09-26

## 2023-09-26 RX ORDER — BETAMETHASONE DIPROPIONATE 0.05 %
OINTMENT (GRAM) TOPICAL ONCE
OUTPATIENT
Start: 2023-09-26 | End: 2023-09-26

## 2023-09-26 NOTE — PROGRESS NOTES
Jan Garcia  AGE: 78 y.o. GENDER: male  : 1945  TODAY'S DATE:  2023    Chief Complaint   Patient presents with    Wound Check     Right lower extremity        HISTORY of PRESENT ILLNESS HPI     Jan Garcia is a 78 y.o. male who presents today for wound evaluation. History of Wound: Right calcaneal ulcer  Wound Pain:  mild  Severity:  2 / 10   Wound Type:  pressure  Modifying Factors:  diabetes  Associated Signs/Symptoms:  none    Procedure Note    Performed by: Joanne Parikh MD    Consent obtained: Yes    Time out taken:  Yes    Pain Control: Anesthetic  Anesthetic: 4% Lidocaine Cream     Debridement:Excisional Debridement    Using curette, #15 blade scalpel, and forceps the wound was sharply debrided    down through and including the removal of subcutaneous tissue.         Devitalized Tissue Debrided:  necrotic/eschar    Pre Debridement Measurements:  Are located in the Wound Documentation Flow Sheet    Wound #: 1     Post  Debridement Measurements:  Wound 23 Heel Right #1 (Active)   Wound Image   23 1005   Wound Etiology Pressure Unstageable 23 1035   Wound Cleansed Cleansed with saline 23 1035   Offloading for Diabetic Foot Ulcers Post op shoe 23 1035   Wound Length (cm) 2.2 cm 23 1019   Wound Width (cm) 3.2 cm 23 1019   Wound Depth (cm) 1 cm 23 1019   Wound Surface Area (cm^2) 7.04 cm^2 23 1019   Change in Wound Size % (l*w) 55.3 23 1019   Wound Volume (cm^3) 7.04 cm^3 23 1019   Wound Healing % -123 23 1019   Post-Procedure Length (cm) 2.2 cm 23 1035   Post-Procedure Width (cm) 3.2 cm 23 1035   Post-Procedure Depth (cm) 1 cm 23 1035   Post-Procedure Surface Area (cm^2) 7.04 cm^2 23 1035   Post-Procedure Volume (cm^3) 7.04 cm^3 23 1035   Wound Assessment Bleeding 23 1035   Drainage Amount Moderate (25-50%) 23 1035   Drainage Description Yellow;Inman 23 1019   Odor

## 2023-09-26 NOTE — PLAN OF CARE
Discharge instructions given. Patient verbalized understanding. Return to [address] [place] [address] in 1 week(s).

## 2023-10-02 NOTE — PATIENT INSTRUCTIONS
C. Keep pressure off of heel- you can place a pillow under leg to allow heel to float. Ok for Therapy- keep pressure off of wound     Vascular Surgeon  Wendy Fortune MD  Phone: [phone] or [phone]     Home Care Agency/Facility: Jamie Hill at [address]: [phone], F: [phone]     Your wound-care supplies will be provided by:   Please note, depending on your insurance coverage, you may have out-of-pocket expenses for these supplies. Someone from the company should call you to confirm your order and discuss those potential costs before they ship your products -- please anticipate that call. If your out-of-pocket cost could be substantial, Many companies have financial hardship programs for patients who qualify, so please ask about that if you might need a hand. If you have any questions about your supplies or your potential out-of-pocket costs, or if you need to place an order for a refill of supplies (typically monthly), please call the company directly. Your  is Forrest Bain     Follow up with Dr Dee Reynolds In 1 week(s) in the wound care center. Wound Care Center Information: Should you experience any significant changes in your wound(s) or have questions about your wound care, please contact the [address] [place] [address] at [phone] Monday  - Thursday 8:00 am - 4:00 pm and Friday 8:00 am - 1:00pm. If you need help with your wound outside these hours and cannot wait until we are again available, contact your PCP or go to the hospital emergency room. PLEASE NOTE: IF YOU ARE UNABLE TO OBTAIN WOUND SUPPLIES, CONTINUE TO USE THE SUPPLIES YOU HAVE AVAILABLE UNTIL YOU ARE ABLE TO REACH US. IT IS MOST IMPORTANT TO KEEP THE WOUND COVERED AT ALL TIMES. Patient Experience     Thank you for trusting us with your care. You may receive a survey from a company called [employer] asking for your feedback.   We would appreciate it if you took a few minutes to share your

## 2023-10-03 ENCOUNTER — HOSPITAL ENCOUNTER (OUTPATIENT)
Dept: WOUND CARE | Age: 78
Discharge: HOME | End: 2023-10-03
Payer: MEDICARE

## 2023-10-03 VITALS
TEMPERATURE: 96.9 F | DIASTOLIC BLOOD PRESSURE: 58 MMHG | RESPIRATION RATE: 18 BRPM | SYSTOLIC BLOOD PRESSURE: 114 MMHG | HEART RATE: 63 BPM

## 2023-10-03 DIAGNOSIS — S91.301A NON-HEALING WOUND OF RIGHT HEEL: ICD-10-CM

## 2023-10-03 DIAGNOSIS — E11.621 DIABETIC ULCER OF LEFT HEEL ASSOCIATED WITH TYPE 2 DIABETES MELLITUS, WITH FAT LAYER EXPOSED (HCC): Primary | ICD-10-CM

## 2023-10-03 DIAGNOSIS — L97.422 DIABETIC ULCER OF LEFT HEEL ASSOCIATED WITH TYPE 2 DIABETES MELLITUS, WITH FAT LAYER EXPOSED (HCC): Primary | ICD-10-CM

## 2023-10-03 DIAGNOSIS — R09.89 DECREASED PULSES IN FEET: ICD-10-CM

## 2023-10-03 DIAGNOSIS — Z79.4 TYPE 2 DIABETES MELLITUS WITH OTHER SPECIFIED COMPLICATION, WITH LONG-TERM CURRENT USE OF INSULIN (HCC): ICD-10-CM

## 2023-10-03 DIAGNOSIS — E11.69 TYPE 2 DIABETES MELLITUS WITH OTHER SPECIFIED COMPLICATION, WITH LONG-TERM CURRENT USE OF INSULIN (HCC): ICD-10-CM

## 2023-10-03 PROBLEM — L89.614 STAGE IV PRESSURE ULCER OF RIGHT HEEL (HCC): Status: ACTIVE | Noted: 2023-10-03

## 2023-10-03 PROCEDURE — 11043 DBRDMT MUSC&/FSCA 1ST 20/<: CPT

## 2023-10-03 PROCEDURE — 11043 DBRDMT MUSC&/FSCA 1ST 20/<: CPT | Performed by: SURGERY

## 2023-10-03 RX ORDER — LIDOCAINE 50 MG/G
OINTMENT TOPICAL ONCE
OUTPATIENT
Start: 2023-10-03 | End: 2023-10-03

## 2023-10-03 RX ORDER — LIDOCAINE HYDROCHLORIDE 20 MG/ML
JELLY TOPICAL ONCE
OUTPATIENT
Start: 2023-10-03 | End: 2023-10-03

## 2023-10-03 RX ORDER — CLOBETASOL PROPIONATE 0.5 MG/G
OINTMENT TOPICAL ONCE
OUTPATIENT
Start: 2023-10-03 | End: 2023-10-03

## 2023-10-03 RX ORDER — GENTAMICIN SULFATE 1 MG/G
OINTMENT TOPICAL ONCE
OUTPATIENT
Start: 2023-10-03 | End: 2023-10-03

## 2023-10-03 RX ORDER — GINSENG 100 MG
CAPSULE ORAL ONCE
OUTPATIENT
Start: 2023-10-03 | End: 2023-10-03

## 2023-10-03 RX ORDER — LIDOCAINE 40 MG/G
CREAM TOPICAL ONCE
OUTPATIENT
Start: 2023-10-03 | End: 2023-10-03

## 2023-10-03 RX ORDER — BACITRACIN ZINC AND POLYMYXIN B SULFATE 500; 1000 [USP'U]/G; [USP'U]/G
OINTMENT TOPICAL ONCE
OUTPATIENT
Start: 2023-10-03 | End: 2023-10-03

## 2023-10-03 RX ORDER — TRIAMCINOLONE ACETONIDE 1 MG/G
OINTMENT TOPICAL ONCE
OUTPATIENT
Start: 2023-10-03 | End: 2023-10-03

## 2023-10-03 RX ORDER — IBUPROFEN 200 MG
TABLET ORAL ONCE
OUTPATIENT
Start: 2023-10-03 | End: 2023-10-03

## 2023-10-03 RX ORDER — LIDOCAINE 40 MG/G
CREAM TOPICAL ONCE
Status: DISCONTINUED | OUTPATIENT
Start: 2023-10-03 | End: 2023-10-04 | Stop reason: HOSPADM

## 2023-10-03 RX ORDER — BETAMETHASONE DIPROPIONATE 0.05 %
OINTMENT (GRAM) TOPICAL ONCE
OUTPATIENT
Start: 2023-10-03 | End: 2023-10-03

## 2023-10-03 RX ORDER — LIDOCAINE HYDROCHLORIDE 40 MG/ML
SOLUTION TOPICAL ONCE
OUTPATIENT
Start: 2023-10-03 | End: 2023-10-03

## 2023-10-03 RX ORDER — SODIUM CHLOR/HYPOCHLOROUS ACID 0.033 %
SOLUTION, IRRIGATION IRRIGATION ONCE
OUTPATIENT
Start: 2023-10-03 | End: 2023-10-03

## 2023-10-03 NOTE — PLAN OF CARE
Discharge instructions given. Patient verbalized understanding. Return to [facility] in 1 week(s).   Laser doppler ordered

## 2023-10-03 NOTE — FLOWSHEET NOTE
10/03/23 1004   Anesthetic   Anesthetic 4% Lidocaine Cream   Wound   Glucose 125   Right Leg Edema Point of Measurement   Compression Therapy Tubular elastic support bandage   Wound 09/12/23 Heel Right #1   Date First Assessed/Time First Assessed: 09/12/23 1005   Present on Original Admission: No  Primary Wound Type: Traumatic  Location: Heel  Wound Location Orientation: Right  Wound Description (Comments): #1   Wound Etiology Pressure Unstageable   Wound Cleansed Cleansed with saline   Offloading for Diabetic Foot Ulcers Post op shoe   Wound Length (cm) 2.2 cm   Wound Width (cm) 3.5 cm   Wound Depth (cm) 0.9 cm   Wound Surface Area (cm^2) 7.7 cm^2   Change in Wound Size % (l*w) 51.11   Wound Volume (cm^3) 6.93 cm^3   Wound Healing % -120   Wound Assessment Devitalized tissue;Granulation tissue;Slough   Drainage Amount Moderate (25-50%)   Drainage Description Serosanguinous;Brown   Odor Mild   Clau-wound Assessment Fragile   Margins Defined edges   Pain Assessment   Pain Assessment None - Denies Pain

## 2023-10-03 NOTE — PROGRESS NOTES
1004   Odor Mild 10/03/23 1004   Clau-wound Assessment Fragile 10/03/23 1004   Margins Defined edges 10/03/23 1004   Wound Thickness Description not for Pressure Injury Full thickness 09/19/23 1035   Number of days: 21           Total Surface Area Debrided: 7.7 sq cm     Bleeding:  Minimal    Hemostasis Achieved:  by pressure    Procedural Pain:  2  / 10     Post Procedural Pain:  0 / 10     Response to treatment:  Well tolerated by patient. The nature of the patient's condition was explained in depth. 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 The wound is slightly smaller in diameter but is deeper. Further debridement was undertaken today. Wound now extends down to covered bone. We will continue current dressing changes and offloading. We will check laser Dopplers before sending the patient to vascular surgery for consultation for possible angiography.   Follow-up in the wound center in 1 week    Yanely Curtis MD, M.LIA.  10/3/2023

## 2023-10-09 ENCOUNTER — OFFICE VISIT (OUTPATIENT)
Dept: ORTHOPEDIC SURGERY | Age: 78
End: 2023-10-09

## 2023-10-09 VITALS — BODY MASS INDEX: 39.99 KG/M2 | HEIGHT: 65 IN | WEIGHT: 240 LBS

## 2023-10-09 DIAGNOSIS — Z98.890 S/P RIGHT KNEE SURGERY: Primary | ICD-10-CM

## 2023-10-09 PROCEDURE — 99024 POSTOP FOLLOW-UP VISIT: CPT | Performed by: ORTHOPAEDIC SURGERY

## 2023-10-09 NOTE — PATIENT INSTRUCTIONS
C. Keep pressure off of heel- you can place a pillow under leg to allow heel to float. Teto Graft for Therapy- may walk normally while in therapy. Keep pressure off of wound when sitting and in bed     Vascular Surgeon  Jazmyn Browne MD  Phone: [phone] or [phone]     Home Care Agency/Facility: Rizwana Milian at [address]: [phone], F: [phone]     Your wound-care supplies will be provided by:   Please note, depending on your insurance coverage, you may have out-of-pocket expenses for these supplies. Someone from the company should call you to confirm your order and discuss those potential costs before they ship your products -- please anticipate that call. If your out-of-pocket cost could be substantial, Many companies have financial hardship programs for patients who qualify, so please ask about that if you might need a hand. If you have any questions about your supplies or your potential out-of-pocket costs, or if you need to place an order for a refill of supplies (typically monthly), please call the company directly. Your  is Ute Qiu     Follow up with Dr Chin Simpson In 1 week(s) in the wound care center. Wound Care Center Information: Should you experience any significant changes in your wound(s) or have questions about your wound care, please contact the [address] [place] [address] at [phone] Monday  - Thursday 8:00 am - 4:00 pm and Friday 8:00 am - 1:00pm. If you need help with your wound outside these hours and cannot wait until we are again available, contact your PCP or go to the hospital emergency room. PLEASE NOTE: IF YOU ARE UNABLE TO OBTAIN WOUND SUPPLIES, CONTINUE TO USE THE SUPPLIES YOU HAVE AVAILABLE UNTIL YOU ARE ABLE TO REACH US. IT IS MOST IMPORTANT TO KEEP THE WOUND COVERED AT ALL TIMES. Patient Experience     Thank you for trusting us with your care. You may receive a survey from a company called [employer] asking for your feedback.   We would

## 2023-10-09 NOTE — PROGRESS NOTES
Patient: Juventino Sosa                  : 1945   MRN: [id number]   Date of Visit: 10/9/23     Physician: Deandre Abraham MD.     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 This reportedly is getting kodak rhowevere it is progressing slowly. Physical Examination??: ?   General: Patient is alert and oriented x 3 and appears comfortable. R knee: incision CDI    Able to perofrm SLR ROM 0-30    Radiographs: no new xrays. Assessment and Plan?: The patient is progressing well approximately 12 weeks s/p revision R TKA with a mesh EM reconstruction on chronic supressive antibiotics for MRSA infection    He will continue to see the wound care clinic for debridmeent of the heel. Ulcer, I remain in close contact with them regarding his prognosis. From a knee perspective, we will start his bending regimen. I will start 0-40 of motion for 3 weeks then 0-90 after that before allowing unrestricted motion at 6 weeks.       _______________________      Deandre Abraham MD

## 2023-10-10 ENCOUNTER — TELEPHONE (OUTPATIENT)
Dept: ORTHOPEDIC SURGERY | Age: 78
End: 2023-10-10

## 2023-10-10 ENCOUNTER — HOSPITAL ENCOUNTER (OUTPATIENT)
Dept: WOUND CARE | Age: 78
Discharge: HOME | End: 2023-10-10
Attending: SURGERY
Payer: MEDICARE

## 2023-10-10 VITALS — SYSTOLIC BLOOD PRESSURE: 117 MMHG | RESPIRATION RATE: 17 BRPM | DIASTOLIC BLOOD PRESSURE: 64 MMHG | HEART RATE: 83 BPM

## 2023-10-10 DIAGNOSIS — E11.621 DIABETIC ULCER OF LEFT HEEL ASSOCIATED WITH TYPE 2 DIABETES MELLITUS, WITH FAT LAYER EXPOSED (HCC): Primary | ICD-10-CM

## 2023-10-10 DIAGNOSIS — L97.422 DIABETIC ULCER OF LEFT HEEL ASSOCIATED WITH TYPE 2 DIABETES MELLITUS, WITH FAT LAYER EXPOSED (HCC): Primary | ICD-10-CM

## 2023-10-10 PROCEDURE — 11042 DBRDMT SUBQ TIS 1ST 20SQCM/<: CPT

## 2023-10-10 RX ORDER — LIDOCAINE HYDROCHLORIDE 40 MG/ML
SOLUTION TOPICAL ONCE
OUTPATIENT
Start: 2023-10-10 | End: 2023-10-10

## 2023-10-10 RX ORDER — BETAMETHASONE DIPROPIONATE 0.05 %
OINTMENT (GRAM) TOPICAL ONCE
OUTPATIENT
Start: 2023-10-10 | End: 2023-10-10

## 2023-10-10 RX ORDER — LIDOCAINE 50 MG/G
OINTMENT TOPICAL ONCE
OUTPATIENT
Start: 2023-10-10 | End: 2023-10-10

## 2023-10-10 RX ORDER — BACITRACIN ZINC AND POLYMYXIN B SULFATE 500; 1000 [USP'U]/G; [USP'U]/G
OINTMENT TOPICAL ONCE
OUTPATIENT
Start: 2023-10-10 | End: 2023-10-10

## 2023-10-10 RX ORDER — CLOBETASOL PROPIONATE 0.5 MG/G
OINTMENT TOPICAL ONCE
OUTPATIENT
Start: 2023-10-10 | End: 2023-10-10

## 2023-10-10 RX ORDER — LIDOCAINE 40 MG/G
CREAM TOPICAL ONCE
Status: DISCONTINUED | OUTPATIENT
Start: 2023-10-10 | End: 2023-10-11 | Stop reason: HOSPADM

## 2023-10-10 RX ORDER — SODIUM CHLOR/HYPOCHLOROUS ACID 0.033 %
SOLUTION, IRRIGATION IRRIGATION ONCE
OUTPATIENT
Start: 2023-10-10 | End: 2023-10-10

## 2023-10-10 RX ORDER — IBUPROFEN 200 MG
TABLET ORAL ONCE
OUTPATIENT
Start: 2023-10-10 | End: 2023-10-10

## 2023-10-10 RX ORDER — LIDOCAINE HYDROCHLORIDE 20 MG/ML
JELLY TOPICAL ONCE
OUTPATIENT
Start: 2023-10-10 | End: 2023-10-10

## 2023-10-10 RX ORDER — LIDOCAINE 40 MG/G
CREAM TOPICAL ONCE
OUTPATIENT
Start: 2023-10-10 | End: 2023-10-10

## 2023-10-10 RX ORDER — TRIAMCINOLONE ACETONIDE 1 MG/G
OINTMENT TOPICAL ONCE
OUTPATIENT
Start: 2023-10-10 | End: 2023-10-10

## 2023-10-10 RX ORDER — GENTAMICIN SULFATE 1 MG/G
OINTMENT TOPICAL ONCE
OUTPATIENT
Start: 2023-10-10 | End: 2023-10-10

## 2023-10-10 RX ORDER — GINSENG 100 MG
CAPSULE ORAL ONCE
OUTPATIENT
Start: 2023-10-10 | End: 2023-10-10

## 2023-10-10 NOTE — TELEPHONE ENCOUNTER
Chato Bond called in with questions about the brace. The pt stated  the pt told them that according to Dr Maylin Karimi he can keep the brace unlocked at 40 degrees. They are calling to verify. I did not see anything in th office note pertaining to it being locked or unlocked      I have reached out to Dr Maylin Karimi for clarification.  I will called Chato Bond back once I hear from the Dr. Chato Bond [phone]

## 2023-10-10 NOTE — TELEPHONE ENCOUNTER
Spoke with the patients wife Alejandra Madison. She was returning the call from [facility]. Alejandra Madison was wanting to make sure that the facility understood that Dr. Maryjane Floyd made adjustments to the brace. But told the patient that [facility] is making a note and will fax it.

## 2023-10-10 NOTE — PROGRESS NOTES
Insurance Verification Request            Ordering Center:     [place] [address] [place], [address] [facility]  Telephone: [phone]    Facility NPI: [id number]  Facility Tax ID [id number]    Florentino Fink RN    Provider Information:     Provider's Name and NPI Arlen Covington MD NPI: [id number]     Patient Information:      [address] [place] [address]   [phone]   : 1945  AGE: 78 y.o. GENDER: male   TODAYS DATE:  10/10/2023      Insurance:        PRIMARY INSURANCE:  Plan: [employer] MEDICARE ADVANTAGE  Coverage: [facility] MEDICARE  Effective Date: 2023  Group Number: [unfilled]  Subscriber Number: [id number] - (Medicare Managed)    Payer/Plan Subscr  Sex Relation Sub. Ins. ID Effective Group Num   1.  [facility] MEDICARE Alexissuki Weiner 1945 Male Self [id number]                                   [address] [address]       Application/product Information:     Benefit Verification Request:    Reason for Request: New Wound    [employer] Debbie Byalban) Fax# [phone]    Face/Hands/Feet [address] (1st 25 sq cm)    TheraSkin    Has patient been treated with any other Skin Substitute for this Wound:   No    If yes, How many previous applications:       WOUND #: 1 Right heel    Total Square CM: 3.36    Procedure setting: Hospital Outpatient Department POS 22    Is the Patient currently in a skilled nursing facility: No     Is the wound work related: No    Procedure date: 10/17/2023        Patient Information:     Dx Codes:   Diabetic Ulcer [] Yes   [] No,   Venous Ulcer [] Yes   [] No,   Other [x] Yes   [] No    Wound ICD-10 Code: L89.614    Problem List Items Addressed This Visit          Endocrine    Diabetic ulcer of left heel associated with type 2 diabetes mellitus, with fat layer exposed ([address]) - Primary    Relevant Medications    lidocaine (LMX) 4 % cream    Other Relevant Orders    Initiate Outpatient Wound

## 2023-10-10 NOTE — PROGRESS NOTES
[address] [place], [address] [facility]  Telephone: [phone]        [place] at [address]: [phone], F: [phone]      Patient Instructions   [address] [place], [address] [facility]  Telephone: [phone]     Discharge Instructions     Important reminders:     **If you have any signs and symptoms of illness (Cough, fever, congestion, nausea, vomiting, diarrhea, etc.) please call the wound care center prior to your appointment. 1. Increase Protein intake for optimal wound healing  2. No added salt to reduce any swelling  3. If diabetic, maintain good glucose control  4. If you smoke, smoking prohibits wound healing, we ask that you refrain from smoking. 5. When taking antibiotics take the entire prescription as ordered. Do not stop taking until medication is all gone unless otherwise instructed. 6. Exercise as tolerated. 7. Keep weight off wounds and reposition every 2 hours if applicable. 8. If wound(s) is on your lower extremity, elevate legs to the level of the heart or above for 30 minutes 4-5 times a day and/or when sitting. Avoid standing for long periods of time. 9. Do not get wounds wet in bath or shower unless otherwise instructed by your physician. If your wound is on your foot or leg, you may purchase a cast bag. Please ask at the pharmacy. If Vascular testing is ordered, please call [address] [place] [address] ([phone]) to schedule. Vascular tests ordered by Wound Care Physicians may take up to 2 hours to complete. Please keep that in mind when scheduling. If Vascular testing is scheduled, please bring supplies to replace your dressing after testing is done. The vascular department does not stock supplies. Wound: Right heel      With each dressing change, rinse wounds with 0.9% Saline. (May use wound wash or soft contact solution.  Both can be purchased at a local

## 2023-10-10 NOTE — PLAN OF CARE
Discharge instructions given. Patient verbalized understanding. Return to [facility] in 1 week(s).

## 2023-10-12 ENCOUNTER — TELEPHONE (OUTPATIENT)
Dept: ORTHOPEDIC SURGERY | Age: 78
End: 2023-10-12

## 2023-10-12 NOTE — TELEPHONE ENCOUNTER
I spoke with April at [facility] letting her know that the pt's brace is to remain in the locked positions at 40 degrees        April understood and all questions were answered

## 2023-10-14 NOTE — PROGRESS NOTES
Juventino Sosa  AGE: 78 y.o. GENDER: male  : 1945  TODAY'S DATE:  10/10/2023    Chief Complaint   Patient presents with    Wound Check     Follow up wound right foot        HISTORY of PRESENT ILLNESS HPI     Juventino Sosa is a 78 y.o. male who presents today for wound evaluation. History of Wound: Right calcaneal wound  Wound Pain:  mild  Severity:  2 / 10   Wound Type:  pressure  Modifying Factors:  diabetes and decreased mobility  Associated Signs/Symptoms:  none    Procedure Note    Performed by: Laya Ramirez MD    Consent obtained: Yes    Time out taken:  Yes    Pain Control: Anesthetic  Anesthetic: 4% Lidocaine Cream     Debridement:Excisional Debridement    Using curette, #15 blade scalpel, and forceps the wound was sharply debrided    down through and including the removal of subcutaneous tissue.         Devitalized Tissue Debrided:  necrotic/eschar    Pre Debridement Measurements:  Are located in the Wound Documentation Flow Sheet    Wound #: 1     Post  Debridement Measurements:  Wound 23 Heel Right #1 (Active)   Wound Image   10/10/23 1038   Wound Etiology Pressure Stage 4 10/10/23 1038   Wound Cleansed Cleansed with saline 10/10/23 1038   Offloading for Diabetic Foot Ulcers Post op shoe 10/03/23 1004   Wound Length (cm) 2.1 cm 10/10/23 1038   Wound Width (cm) 1.6 cm 10/10/23 1038   Wound Depth (cm) 0.5 cm 10/10/23 1038   Wound Surface Area (cm^2) 3.36 cm^2 10/10/23 1038   Change in Wound Size % (l*w) 78.67 10/10/23 1038   Wound Volume (cm^3) 1.68 cm^3 10/10/23 1038   Wound Healing % 47 10/10/23 1038   Post-Procedure Length (cm) 2.1 cm 10/10/23 1059   Post-Procedure Width (cm) 1.6 cm 10/10/23 1059   Post-Procedure Depth (cm) 0.5 cm 10/10/23 1059   Post-Procedure Surface Area (cm^2) 3.36 cm^2 10/10/23 1059   Post-Procedure Volume (cm^3) 1.68 cm^3 10/10/23 1059   Wound Assessment Bleeding 10/10/23 1059   Drainage Amount Moderate (25-50%) 10/10/23 1059   Drainage Description

## 2023-10-16 NOTE — PATIENT INSTRUCTIONS
[address], [address] [facility]  Telephone: [phone]     Discharge Instructions     Important reminders:     **If you have any signs and symptoms of illness (Cough, fever, congestion, nausea, vomiting, diarrhea, etc.) please call the wound care center prior to your appointment. 1. Increase Protein intake for optimal wound healing  2. No added salt to reduce any swelling  3. If diabetic, maintain good glucose control  4. If you smoke, smoking prohibits wound healing, we ask that you refrain from smoking. 5. When taking antibiotics take the entire prescription as ordered. Do not stop taking until medication is all gone unless otherwise instructed. 6. Exercise as tolerated. 7. Keep weight off wounds and reposition every 2 hours if applicable. 8. If wound(s) is on your lower extremity, elevate legs to the level of the heart or above for 30 minutes 4-5 times a day and/or when sitting. Avoid standing for long periods of time. 9. Do not get wounds wet in bath or shower unless otherwise instructed by your physician. If your wound is on your foot or leg, you may purchase a cast bag. Please ask at the pharmacy. If Vascular testing is ordered, please call [address] [place] [address] ([phone]) to schedule. Vascular tests ordered by Wound Care Physicians may take up to 2 hours to complete. Please keep that in mind when scheduling. If Vascular testing is scheduled, please bring supplies to replace your dressing after testing is done. The vascular department does not stock supplies. Wound: Right heel      With each dressing change, rinse wounds with 0.9% Saline. (May use wound wash or soft contact solution. Both can be purchased at a local drug store). If unable to obtain saline, may use a gentle soap and water. Dressing care: Wear surgical shoe. In clinic: Santyl, moist to dry, dry dressing, 1 layer tubi .  At facility: Skin prep to

## 2023-10-17 ENCOUNTER — HOSPITAL ENCOUNTER (OUTPATIENT)
Dept: WOUND CARE | Age: 78
Discharge: HOME | End: 2023-10-17
Attending: SURGERY
Payer: MEDICARE

## 2023-10-17 VITALS
SYSTOLIC BLOOD PRESSURE: 110 MMHG | TEMPERATURE: 96.5 F | DIASTOLIC BLOOD PRESSURE: 57 MMHG | HEART RATE: 64 BPM | RESPIRATION RATE: 16 BRPM

## 2023-10-17 DIAGNOSIS — E11.621 DIABETIC ULCER OF LEFT HEEL ASSOCIATED WITH TYPE 2 DIABETES MELLITUS, WITH FAT LAYER EXPOSED (HCC): Primary | ICD-10-CM

## 2023-10-17 DIAGNOSIS — L97.422 DIABETIC ULCER OF LEFT HEEL ASSOCIATED WITH TYPE 2 DIABETES MELLITUS, WITH FAT LAYER EXPOSED (HCC): Primary | ICD-10-CM

## 2023-10-17 PROCEDURE — 11042 DBRDMT SUBQ TIS 1ST 20SQCM/<: CPT

## 2023-10-17 PROCEDURE — 11043 DBRDMT MUSC&/FSCA 1ST 20/<: CPT | Performed by: SURGERY

## 2023-10-17 RX ORDER — LIDOCAINE 40 MG/G
CREAM TOPICAL ONCE
OUTPATIENT
Start: 2023-10-17 | End: 2023-10-17

## 2023-10-17 RX ORDER — SODIUM CHLOR/HYPOCHLOROUS ACID 0.033 %
SOLUTION, IRRIGATION IRRIGATION ONCE
OUTPATIENT
Start: 2023-10-17 | End: 2023-10-17

## 2023-10-17 RX ORDER — BETAMETHASONE DIPROPIONATE 0.05 %
OINTMENT (GRAM) TOPICAL ONCE
OUTPATIENT
Start: 2023-10-17 | End: 2023-10-17

## 2023-10-17 RX ORDER — LIDOCAINE HYDROCHLORIDE 20 MG/ML
JELLY TOPICAL ONCE
OUTPATIENT
Start: 2023-10-17 | End: 2023-10-17

## 2023-10-17 RX ORDER — LIDOCAINE HYDROCHLORIDE 40 MG/ML
SOLUTION TOPICAL ONCE
OUTPATIENT
Start: 2023-10-17 | End: 2023-10-17

## 2023-10-17 RX ORDER — CLOBETASOL PROPIONATE 0.5 MG/G
OINTMENT TOPICAL ONCE
OUTPATIENT
Start: 2023-10-17 | End: 2023-10-17

## 2023-10-17 RX ORDER — LIDOCAINE 50 MG/G
OINTMENT TOPICAL ONCE
OUTPATIENT
Start: 2023-10-17 | End: 2023-10-17

## 2023-10-17 RX ORDER — GENTAMICIN SULFATE 1 MG/G
OINTMENT TOPICAL ONCE
OUTPATIENT
Start: 2023-10-17 | End: 2023-10-17

## 2023-10-17 RX ORDER — GINSENG 100 MG
CAPSULE ORAL ONCE
OUTPATIENT
Start: 2023-10-17 | End: 2023-10-17

## 2023-10-17 RX ORDER — TRIAMCINOLONE ACETONIDE 1 MG/G
OINTMENT TOPICAL ONCE
OUTPATIENT
Start: 2023-10-17 | End: 2023-10-17

## 2023-10-17 RX ORDER — IBUPROFEN 200 MG
TABLET ORAL ONCE
OUTPATIENT
Start: 2023-10-17 | End: 2023-10-17

## 2023-10-17 RX ORDER — BACITRACIN ZINC AND POLYMYXIN B SULFATE 500; 1000 [USP'U]/G; [USP'U]/G
OINTMENT TOPICAL ONCE
OUTPATIENT
Start: 2023-10-17 | End: 2023-10-17

## 2023-10-17 RX ORDER — LIDOCAINE 40 MG/G
CREAM TOPICAL ONCE
Status: DISCONTINUED | OUTPATIENT
Start: 2023-10-17 | End: 2023-10-18 | Stop reason: HOSPADM

## 2023-10-17 NOTE — PLAN OF CARE
Discharge instructions given. Patient verbalized understanding. Return to [facility] in 1 week(s).   Called/faxed orders to [facility]  NPWT ordered

## 2023-10-17 NOTE — PROGRESS NOTES
Assessment Blanchable erythema;Edematous; Intact 10/17/23 1020   Margins Defined edges 10/17/23 1020   Wound Thickness Description not for Pressure Injury Full thickness 09/19/23 1035   Number of days: 35           Total Surface Area Debrided: 4.2 sq cm     Bleeding: Minimal    Hemostasis Achieved:  by pressure    Procedural Pain:  2  / 10     Post Procedural Pain:  0 / 10     Response to treatment:  Well tolerated by patient. The nature of the patient's condition was explained in depth. 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 Debridement was performed which included necrotic Achilles tendon. Overall the wound is slowly improving. It is clean enough to where we will change him to a wound VAC at 125 mmHg. Continue to proceed with vascular work-up which includes laser Dopplers and an appointment with Dr. Adolfo Weber. Follow-up in the wound center again in 1 week.     Jossue Larson MD, M.D.  10/17/2023
8:00 am - 1:00pm. If you need help with your wound outside these hours and cannot wait until we are again available, contact your PCP or go to the hospital emergency room. PLEASE NOTE: IF YOU ARE UNABLE TO OBTAIN WOUND SUPPLIES, CONTINUE TO USE THE SUPPLIES YOU HAVE AVAILABLE UNTIL YOU ARE ABLE TO REACH US. IT IS MOST IMPORTANT TO KEEP THE WOUND COVERED AT ALL TIMES. Patient Experience     Thank you for trusting us with your care. You may receive a survey from a company called [employer] asking for your feedback. We would appreciate it if you took a few minutes to share your experience. Your input is very valuable to us. Skilled nurse to evaluate and treat for wound care. Change dressing as ordered  once a day on Monday, Wednesday, and Friday using clean technique. Patient/Family/caregiver may change dressings as needed as instructed when Home Care unavailable.     WOUNDS REQUIRING DRESSING Changes:     Wound 09/12/23 Heel Right #1 (Active)   Wound Image   10/10/23 1038   Wound Etiology Pressure Stage 4 10/17/23 1020   Wound Cleansed Cleansed with saline 10/17/23 1020   Offloading for Diabetic Foot Ulcers Post op shoe 10/03/23 1004   Wound Length (cm) 2.1 cm 10/17/23 1020   Wound Width (cm) 2 cm 10/17/23 1020   Wound Depth (cm) 0.5 cm 10/17/23 1020   Wound Surface Area (cm^2) 4.2 cm^2 10/17/23 1020   Change in Wound Size % (l*w) 73.33 10/17/23 1020   Wound Volume (cm^3) 2.1 cm^3 10/17/23 1020   Wound Healing % 33 10/17/23 1020   Post-Procedure Length (cm) 2.1 cm 10/17/23 1048   Post-Procedure Width (cm) 2 cm 10/17/23 1048   Post-Procedure Depth (cm) 0.5 cm 10/17/23 1048   Post-Procedure Surface Area (cm^2) 4.2 cm^2 10/17/23 1048   Post-Procedure Volume (cm^3) 2.1 cm^3 10/17/23 1048   Wound Assessment Bleeding 10/17/23 1048   Drainage Amount Moderate (25-50%) 10/17/23 1048   Drainage Description Serosanguinous 10/17/23 1020   Odor None 10/17/23 1020   Clau-wound Assessment Blanchable

## 2023-10-18 ENCOUNTER — TELEPHONE (OUTPATIENT)
Dept: ORTHOPEDIC SURGERY | Age: 78
End: 2023-10-18

## 2023-10-18 NOTE — TELEPHONE ENCOUNTER
I spoke with the pt;s wife. She is concerned that the facility is not allow the pt to walk regularly in the boot. She stated they have him walk on the ball of his foot. She stated that Dr Christie Schulz stated that he can walk normally    I asked her to reach out to his office to both call the SNF and fax over those orders since they are the ones who made the note stating that is able to walk heal to toe.       She understood and all questions were answered

## 2023-10-24 ENCOUNTER — HOSPITAL ENCOUNTER (OUTPATIENT)
Dept: WOUND CARE | Age: 78
Discharge: HOME | End: 2023-10-24
Attending: SURGERY
Payer: MEDICARE

## 2023-10-24 VITALS — RESPIRATION RATE: 16 BRPM | SYSTOLIC BLOOD PRESSURE: 137 MMHG | TEMPERATURE: 96.7 F | DIASTOLIC BLOOD PRESSURE: 81 MMHG

## 2023-10-24 DIAGNOSIS — E11.621 DIABETIC ULCER OF LEFT HEEL ASSOCIATED WITH TYPE 2 DIABETES MELLITUS, WITH FAT LAYER EXPOSED (HCC): Primary | ICD-10-CM

## 2023-10-24 DIAGNOSIS — L97.422 DIABETIC ULCER OF LEFT HEEL ASSOCIATED WITH TYPE 2 DIABETES MELLITUS, WITH FAT LAYER EXPOSED (HCC): Primary | ICD-10-CM

## 2023-10-24 PROCEDURE — 11043 DBRDMT MUSC&/FSCA 1ST 20/<: CPT

## 2023-10-24 PROCEDURE — 11043 DBRDMT MUSC&/FSCA 1ST 20/<: CPT | Performed by: SURGERY

## 2023-10-24 RX ORDER — BACITRACIN ZINC AND POLYMYXIN B SULFATE 500; 1000 [USP'U]/G; [USP'U]/G
OINTMENT TOPICAL ONCE
OUTPATIENT
Start: 2023-10-24 | End: 2023-10-24

## 2023-10-24 RX ORDER — IBUPROFEN 200 MG
TABLET ORAL ONCE
OUTPATIENT
Start: 2023-10-24 | End: 2023-10-24

## 2023-10-24 RX ORDER — BETAMETHASONE DIPROPIONATE 0.05 %
OINTMENT (GRAM) TOPICAL ONCE
OUTPATIENT
Start: 2023-10-24 | End: 2023-10-24

## 2023-10-24 RX ORDER — CLOBETASOL PROPIONATE 0.5 MG/G
OINTMENT TOPICAL ONCE
OUTPATIENT
Start: 2023-10-24 | End: 2023-10-24

## 2023-10-24 RX ORDER — LIDOCAINE HYDROCHLORIDE 20 MG/ML
JELLY TOPICAL ONCE
OUTPATIENT
Start: 2023-10-24 | End: 2023-10-24

## 2023-10-24 RX ORDER — SODIUM CHLOR/HYPOCHLOROUS ACID 0.033 %
SOLUTION, IRRIGATION IRRIGATION ONCE
OUTPATIENT
Start: 2023-10-24 | End: 2023-10-24

## 2023-10-24 RX ORDER — LIDOCAINE 40 MG/G
CREAM TOPICAL ONCE
Status: DISCONTINUED | OUTPATIENT
Start: 2023-10-24 | End: 2023-10-25 | Stop reason: HOSPADM

## 2023-10-24 RX ORDER — GINSENG 100 MG
CAPSULE ORAL ONCE
OUTPATIENT
Start: 2023-10-24 | End: 2023-10-24

## 2023-10-24 RX ORDER — TRIAMCINOLONE ACETONIDE 1 MG/G
OINTMENT TOPICAL ONCE
OUTPATIENT
Start: 2023-10-24 | End: 2023-10-24

## 2023-10-24 RX ORDER — LIDOCAINE HYDROCHLORIDE 40 MG/ML
SOLUTION TOPICAL ONCE
OUTPATIENT
Start: 2023-10-24 | End: 2023-10-24

## 2023-10-24 RX ORDER — LIDOCAINE 40 MG/G
CREAM TOPICAL ONCE
OUTPATIENT
Start: 2023-10-24 | End: 2023-10-24

## 2023-10-24 RX ORDER — LIDOCAINE 50 MG/G
OINTMENT TOPICAL ONCE
OUTPATIENT
Start: 2023-10-24 | End: 2023-10-24

## 2023-10-24 RX ORDER — GENTAMICIN SULFATE 1 MG/G
OINTMENT TOPICAL ONCE
OUTPATIENT
Start: 2023-10-24 | End: 2023-10-24

## 2023-10-24 NOTE — PROGRESS NOTES
Karen Anna  AGE: 78 y.o. GENDER: male  : 1945  TODAY'S DATE:  10/24/2023    Chief Complaint   Patient presents with    Wound Check     RLE        HISTORY of PRESENT ILLNESS HPI     Karen Anna is a 78 y.o. male who presents today for wound evaluation. History of Wound: Right calcaneal ulcer  Wound Pain:  mild  Severity:  2 / 10   Wound Type:  pressure  Modifying Factors:  diabetes and chronic pressure  Associated Signs/Symptoms:  none    Procedure Note    Performed by: Rosanne Guzmán MD    Consent obtained: Yes    Time out taken:  Yes    Pain Control: Anesthetic  Anesthetic: 4% Lidocaine Cream     Debridement:Excisional Debridement    Using curette, #15 blade scalpel, and forceps the wound was sharply debrided    down through and including the removal of muscle/fascia. Devitalized Tissue Debrided:  necrotic/eschar    Pre Debridement Measurements:  Are located in the Wound Documentation Flow Sheet    Wound #: 1     Post  Debridement Measurements:  Wound 23 Heel Right #1 (Active)   Wound Image   10/10/23 1038   Wound Etiology Pressure Stage 4 10/24/23 1023   Wound Cleansed Cleansed with saline 10/24/23 1023   Offloading for Diabetic Foot Ulcers Post op shoe 10/03/23 1004   Wound Length (cm) 1.9 cm 10/24/23 1023   Wound Width (cm) 1.9 cm 10/24/23 1023   Wound Depth (cm) 0.5 cm 10/24/23 1023   Wound Surface Area (cm^2) 3.61 cm^2 10/24/23 1023   Change in Wound Size % (l*w) 77.08 10/24/23 1023   Wound Volume (cm^3) 1.805 cm^3 10/24/23 1023   Wound Healing % 43 10/24/23 1023   Post-Procedure Length (cm) 1.9 cm 10/24/23 1041   Post-Procedure Width (cm) 1.9 cm 10/24/23 1041   Post-Procedure Depth (cm) 0.5 cm 10/24/23 1041   Post-Procedure Surface Area (cm^2) 3.61 cm^2 10/24/23 1041   Post-Procedure Volume (cm^3) 1.805 cm^3 10/24/23 1041   Wound Assessment Bleeding 10/24/23 1041   Drainage Amount Moderate (25-50%) 10/24/23 1023   Drainage Description Serosanguinous; Sanguinous 10/24/23

## 2023-10-24 NOTE — PATIENT INSTRUCTIONS
UNABLE TO OBTAIN WOUND SUPPLIES, CONTINUE TO USE THE SUPPLIES YOU HAVE AVAILABLE UNTIL YOU ARE ABLE TO REACH US. IT IS MOST IMPORTANT TO KEEP THE WOUND COVERED AT ALL TIMES. Patient Experience     Thank you for trusting us with your care. You may receive a survey from a company called [employer] asking for your feedback. We would appreciate it if you took a few minutes to share your experience. Your input is very valuable to us.

## 2023-10-24 NOTE — PLAN OF CARE
Discharge instructions given. Patient verbalized understanding. Return to [address] [place] [address] in 1 week(s).

## 2023-10-25 ENCOUNTER — HOSPITAL ENCOUNTER (OUTPATIENT)
Dept: VASCULAR LAB | Age: 78
Discharge: HOME | End: 2023-10-25
Payer: MEDICARE

## 2023-10-25 DIAGNOSIS — R09.89 DECREASED PULSES IN FEET: ICD-10-CM

## 2023-10-25 DIAGNOSIS — S91.301A NON-HEALING WOUND OF RIGHT HEEL: ICD-10-CM

## 2023-10-25 DIAGNOSIS — E11.69 TYPE 2 DIABETES MELLITUS WITH OTHER SPECIFIED COMPLICATION, WITH LONG-TERM CURRENT USE OF INSULIN (HCC): ICD-10-CM

## 2023-10-25 DIAGNOSIS — Z79.4 TYPE 2 DIABETES MELLITUS WITH OTHER SPECIFIED COMPLICATION, WITH LONG-TERM CURRENT USE OF INSULIN (HCC): ICD-10-CM

## 2023-10-25 PROCEDURE — 93922 UPR/L XTREMITY ART 2 LEVELS: CPT

## 2023-10-26 NOTE — PATIENT INSTRUCTIONS
PLEASE NOTE: IF YOU ARE UNABLE TO OBTAIN WOUND SUPPLIES, CONTINUE TO USE THE SUPPLIES YOU HAVE AVAILABLE UNTIL YOU ARE ABLE TO REACH US. IT IS MOST IMPORTANT TO KEEP THE WOUND COVERED AT ALL TIMES. Patient Experience     Thank you for trusting us with your care. You may receive a survey from a company called [employer] asking for your feedback. We would appreciate it if you took a few minutes to share your experience. Your input is very valuable to us.

## 2023-10-30 ENCOUNTER — OFFICE VISIT (OUTPATIENT)
Dept: ORTHOPEDIC SURGERY | Age: 78
End: 2023-10-30
Payer: MEDICARE

## 2023-10-30 DIAGNOSIS — Z47.89 AFTERCARE FOLLOWING SURGERY OF THE MUSCULOSKELETAL SYSTEM: Primary | ICD-10-CM

## 2023-10-30 PROCEDURE — 99212 OFFICE O/P EST SF 10 MIN: CPT | Performed by: ORTHOPAEDIC SURGERY

## 2023-10-30 PROCEDURE — 1123F ACP DISCUSS/DSCN MKR DOCD: CPT | Performed by: ORTHOPAEDIC SURGERY

## 2023-10-30 NOTE — PROGRESS NOTES
Patient: Isha Schwartz                  : 1945   MRN: [id number]   Date of Visit: 10/30/23     Physician: Deep Slater MD.     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 This reportedly is getting better rhowevere it is progressing slowly. Physical Examination??: ?   General: Patient is alert and oriented x 3 and appears comfortable. R knee: incision CDI    Able to perofrm SLR ROM 0-30    Radiographs: no new xrays. Assessment and Plan?: The patient is progressing well approximately 15 weeks s/p revision R TKA with a mesh EM reconstruction on chronic supressive antibiotics for MRSA infection    He will continue to see the wound care clinic for debridmeent of the heel. Ulcer, I remain in close contact with them regarding his prognosis. From a knee perspective, we will continue his bending regimen. I will start 0-90 of motion for 2 weeks then unlocked after that. We will need him to be more aggressive working on bending of the knee.       _______________________      Deep Slater MD

## 2023-10-31 ENCOUNTER — HOSPITAL ENCOUNTER (OUTPATIENT)
Dept: WOUND CARE | Age: 78
Discharge: HOME | End: 2023-10-31
Attending: SURGERY
Payer: MEDICARE

## 2023-10-31 VITALS — DIASTOLIC BLOOD PRESSURE: 69 MMHG | SYSTOLIC BLOOD PRESSURE: 115 MMHG | HEART RATE: 82 BPM | TEMPERATURE: 96.8 F

## 2023-10-31 DIAGNOSIS — E11.621 DIABETIC ULCER OF LEFT HEEL ASSOCIATED WITH TYPE 2 DIABETES MELLITUS, WITH FAT LAYER EXPOSED (HCC): Primary | ICD-10-CM

## 2023-10-31 DIAGNOSIS — L97.422 DIABETIC ULCER OF LEFT HEEL ASSOCIATED WITH TYPE 2 DIABETES MELLITUS, WITH FAT LAYER EXPOSED (HCC): Primary | ICD-10-CM

## 2023-10-31 PROCEDURE — 11042 DBRDMT SUBQ TIS 1ST 20SQCM/<: CPT | Performed by: SURGERY

## 2023-10-31 PROCEDURE — 11042 DBRDMT SUBQ TIS 1ST 20SQCM/<: CPT

## 2023-10-31 RX ORDER — LIDOCAINE 40 MG/G
CREAM TOPICAL ONCE
OUTPATIENT
Start: 2023-10-31 | End: 2023-10-31

## 2023-10-31 RX ORDER — LIDOCAINE HYDROCHLORIDE 20 MG/ML
JELLY TOPICAL ONCE
OUTPATIENT
Start: 2023-10-31 | End: 2023-10-31

## 2023-10-31 RX ORDER — SODIUM CHLOR/HYPOCHLOROUS ACID 0.033 %
SOLUTION, IRRIGATION IRRIGATION ONCE
OUTPATIENT
Start: 2023-10-31 | End: 2023-10-31

## 2023-10-31 RX ORDER — GENTAMICIN SULFATE 1 MG/G
OINTMENT TOPICAL ONCE
OUTPATIENT
Start: 2023-10-31 | End: 2023-10-31

## 2023-10-31 RX ORDER — CLOBETASOL PROPIONATE 0.5 MG/G
OINTMENT TOPICAL ONCE
OUTPATIENT
Start: 2023-10-31 | End: 2023-10-31

## 2023-10-31 RX ORDER — LIDOCAINE 40 MG/G
CREAM TOPICAL ONCE
Status: DISCONTINUED | OUTPATIENT
Start: 2023-10-31 | End: 2023-11-01 | Stop reason: HOSPADM

## 2023-10-31 RX ORDER — IBUPROFEN 200 MG
TABLET ORAL ONCE
OUTPATIENT
Start: 2023-10-31 | End: 2023-10-31

## 2023-10-31 RX ORDER — LIDOCAINE HYDROCHLORIDE 40 MG/ML
SOLUTION TOPICAL ONCE
OUTPATIENT
Start: 2023-10-31 | End: 2023-10-31

## 2023-10-31 RX ORDER — TRIAMCINOLONE ACETONIDE 1 MG/G
OINTMENT TOPICAL ONCE
OUTPATIENT
Start: 2023-10-31 | End: 2023-10-31

## 2023-10-31 RX ORDER — GINSENG 100 MG
CAPSULE ORAL ONCE
OUTPATIENT
Start: 2023-10-31 | End: 2023-10-31

## 2023-10-31 RX ORDER — BETAMETHASONE DIPROPIONATE 0.05 %
OINTMENT (GRAM) TOPICAL ONCE
OUTPATIENT
Start: 2023-10-31 | End: 2023-10-31

## 2023-10-31 RX ORDER — BACITRACIN ZINC AND POLYMYXIN B SULFATE 500; 1000 [USP'U]/G; [USP'U]/G
OINTMENT TOPICAL ONCE
OUTPATIENT
Start: 2023-10-31 | End: 2023-10-31

## 2023-10-31 RX ORDER — LIDOCAINE 50 MG/G
OINTMENT TOPICAL ONCE
OUTPATIENT
Start: 2023-10-31 | End: 2023-10-31

## 2023-10-31 NOTE — PLAN OF CARE
Discharge instructions given. Patient verbalized understanding. Return to [facility] in 2 week(s).

## 2023-10-31 NOTE — PROGRESS NOTES
Blossom Gerard  AGE: 78 y.o. GENDER: male  : 1945  TODAY'S DATE:  10/31/2023    Chief Complaint   Patient presents with    Wound Check     Right leg F/U         HISTORY of PRESENT ILLNESS HPI     Blossom Gerard is a 78 y.o. male who presents today for wound evaluation. History of Wound: Pressure ulcer right calcaneal region  Wound Pain:  mild  Severity:  2 / 10   Wound Type:  pressure  Modifying Factors:  diabetes  Associated Signs/Symptoms:  none    Procedure Note    Performed by: Noemi Philip MD    Consent obtained: Yes    Time out taken:  Yes    Pain Control: Anesthetic  Anesthetic: 4% Lidocaine Cream     Debridement:Excisional Debridement    Using curette the wound was sharply debrided    down through and including the removal of subcutaneous tissue. Devitalized Tissue Debrided:  necrotic/eschar    Pre Debridement Measurements:  Are located in the Wound Documentation Flow Sheet    Wound #: 1     Post  Debridement Measurements:  Wound 23 Heel Right #1 (Active)   Wound Image   10/10/23 1038   Wound Etiology Pressure Stage 4 10/31/23 1036   Wound Cleansed Cleansed with saline; Other (Comment) 10/31/23 1036   Offloading for Diabetic Foot Ulcers Post op shoe 10/31/23 1036   Wound Length (cm) 2 cm 10/31/23 1036   Wound Width (cm) 1.1 cm 10/31/23 1036   Wound Depth (cm) 0.8 cm 10/31/23 1036   Wound Surface Area (cm^2) 2.2 cm^2 10/31/23 1036   Change in Wound Size % (l*w) 86.03 10/31/23 1036   Wound Volume (cm^3) 1.76 cm^3 10/31/23 1036   Wound Healing % 44 10/31/23 1036   Post-Procedure Length (cm) 2 cm 10/31/23 1050   Post-Procedure Width (cm) 1.1 cm 10/31/23 1050   Post-Procedure Depth (cm) 0.8 cm 10/31/23 1050   Post-Procedure Surface Area (cm^2) 2.2 cm^2 10/31/23 1050   Post-Procedure Volume (cm^3) 1.76 cm^3 10/31/23 1050   Wound Assessment Bleeding 10/31/23 1050   Drainage Amount Moderate (25-50%) 10/31/23 1050   Drainage Description Serosanguinous 10/31/23 1036   Odor Mild

## 2023-11-08 ENCOUNTER — OFFICE VISIT (OUTPATIENT)
Dept: VASCULAR SURGERY | Age: 78
End: 2023-11-08
Payer: MEDICARE

## 2023-11-08 VITALS — HEIGHT: 65 IN | BODY MASS INDEX: 39.99 KG/M2 | HEART RATE: 80 BPM | WEIGHT: 240 LBS

## 2023-11-08 DIAGNOSIS — S91.301A NON-HEALING WOUND OF RIGHT HEEL: Primary | ICD-10-CM

## 2023-11-08 PROCEDURE — 99203 OFFICE O/P NEW LOW 30 MIN: CPT | Performed by: SURGERY

## 2023-11-08 PROCEDURE — 1123F ACP DISCUSS/DSCN MKR DOCD: CPT | Performed by: SURGERY

## 2023-11-08 ASSESSMENT — ENCOUNTER SYMPTOMS
EYES NEGATIVE: 1
ALLERGIC/IMMUNOLOGIC NEGATIVE: 1
GASTROINTESTINAL NEGATIVE: 1
RESPIRATORY NEGATIVE: 1

## 2023-11-08 NOTE — PROGRESS NOTES
See Scanned Documents.  
is  considered to have a good probability for healing. Left Impression  Pulse Volume Recording at the left transmetatarsal reveals normal waveforms. Laser skin perfusion pressure study at the left dorsum of the foot is 105mmHg;  wound healing likely. This is considered to have a good probability for  healing. No previous studies for comparison. Conclusions      Summary      Good probability of wound healing in the bilateral lower extremities. Assessment:      1) Decubitus wound R heal - improving with  excellent care  2) No hemodynamically significant infrainguinal arterial disease preventing healing      Plan:      Continue local care through Dr. Viktoriya Serra at [facility]. Full expectation that this will heal with continue good local wound care. Would not proceed with any further vascular evaluation or intervention. F/U with Dr. Viktoriya Serra at [facility] and here prn.

## 2023-11-13 ENCOUNTER — OFFICE VISIT (OUTPATIENT)
Dept: ORTHOPEDIC SURGERY | Age: 78
End: 2023-11-13
Payer: MEDICARE

## 2023-11-13 DIAGNOSIS — Z47.89 AFTERCARE FOLLOWING SURGERY OF THE MUSCULOSKELETAL SYSTEM: Primary | ICD-10-CM

## 2023-11-13 PROCEDURE — 99213 OFFICE O/P EST LOW 20 MIN: CPT | Performed by: ORTHOPAEDIC SURGERY

## 2023-11-13 PROCEDURE — 1123F ACP DISCUSS/DSCN MKR DOCD: CPT | Performed by: ORTHOPAEDIC SURGERY

## 2023-11-13 NOTE — PATIENT INSTRUCTIONS
1 layer tubi - change daily. Take a multivitamin & Vitamin C. Keep pressure off of heel when sitting or when in bed- get offloading boots and you can place a pillow under leg to allow heel to float. Alia Clayton for Therapy- may walk normally while in therapy. Keep pressure off of wound when sitting and in bed     Vascular Surgeon  Micah Guillermo MD  Phone: [phone] or [phone]     Home Care Agency/Facility: [facility] at [address]: [phone], F: [phone]     Your wound-care supplies will be provided by:   Please note, depending on your insurance coverage, you may have out-of-pocket expenses for these supplies. Someone from the company should call you to confirm your order and discuss those potential costs before they ship your products -- please anticipate that call. If your out-of-pocket cost could be substantial, Many companies have financial hardship programs for patients who qualify, so please ask about that if you might need a hand. If you have any questions about your supplies or your potential out-of-pocket costs, or if you need to place an order for a refill of supplies (typically monthly), please call the company directly. Your  is Paulette De La Cruz     Follow up with Dr Vashti Quesada In 2 week(s) in the wound care center. Wound Care Center Information: Should you experience any significant changes in your wound(s) or have questions about your wound care, please contact the [address] [place] [address] at [phone] Monday  - Thursday 8:00 am - 4:00 pm and Friday 8:00 am - 1:00pm. If you need help with your wound outside these hours and cannot wait until we are again available, contact your PCP or go to the hospital emergency room. PLEASE NOTE: IF YOU ARE UNABLE TO OBTAIN WOUND SUPPLIES, CONTINUE TO USE THE SUPPLIES YOU HAVE AVAILABLE UNTIL YOU ARE ABLE TO REACH US. IT IS MOST IMPORTANT TO KEEP THE WOUND COVERED AT ALL TIMES.      Patient Experience     Thank you for

## 2023-11-13 NOTE — PROGRESS NOTES
Patient: Jan Garcia                  : 1945   MRN: [id number]   Date of Visit: 23     Physician: Rachelle Badillo MD.     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 This reportedly is getting better rhowevere it is progressing slowly. Physical Examination??: ?   General: Patient is alert and oriented x 3 and appears comfortable. R knee: incision CDI    Able to perofrm SLR ROM 0-45    Radiographs: no new xrays. Assessment and Plan?: The patient is progressing well approximately 18 weeks s/p revision R TKA with a mesh EM reconstruction on chronic supressive antibiotics for MRSA infection    He will continue to see the wound care clinic for debridmeent of the heel. Ulcer, I remain in close contact with them regarding his prognosis. From a knee perspective, we will continue his bending regimen. He needs to be more aggressive with the PT, therefore I will remove the brace, we provided instructions for goal of 90d flexion, WBAT. We will need him to be more aggressive working on bending of the knee. I will see him back in 6 weeks.  His discharge from the facility     _______________________      Rachelle Badillo MD

## 2023-11-14 ENCOUNTER — HOSPITAL ENCOUNTER (OUTPATIENT)
Dept: WOUND CARE | Age: 78
Discharge: HOME | End: 2023-11-14
Attending: SURGERY
Payer: MEDICARE

## 2023-11-14 DIAGNOSIS — L97.422 DIABETIC ULCER OF LEFT HEEL ASSOCIATED WITH TYPE 2 DIABETES MELLITUS, WITH FAT LAYER EXPOSED (HCC): Primary | ICD-10-CM

## 2023-11-14 DIAGNOSIS — E11.621 DIABETIC ULCER OF LEFT HEEL ASSOCIATED WITH TYPE 2 DIABETES MELLITUS, WITH FAT LAYER EXPOSED (HCC): Primary | ICD-10-CM

## 2023-11-14 PROCEDURE — 11042 DBRDMT SUBQ TIS 1ST 20SQCM/<: CPT | Performed by: SURGERY

## 2023-11-14 PROCEDURE — 11042 DBRDMT SUBQ TIS 1ST 20SQCM/<: CPT

## 2023-11-14 RX ORDER — SODIUM CHLOR/HYPOCHLOROUS ACID 0.033 %
SOLUTION, IRRIGATION IRRIGATION ONCE
OUTPATIENT
Start: 2023-11-14 | End: 2023-11-14

## 2023-11-14 RX ORDER — CLOBETASOL PROPIONATE 0.5 MG/G
OINTMENT TOPICAL ONCE
OUTPATIENT
Start: 2023-11-14 | End: 2023-11-14

## 2023-11-14 RX ORDER — LIDOCAINE HYDROCHLORIDE 40 MG/ML
SOLUTION TOPICAL ONCE
OUTPATIENT
Start: 2023-11-14 | End: 2023-11-14

## 2023-11-14 RX ORDER — BACITRACIN ZINC AND POLYMYXIN B SULFATE 500; 1000 [USP'U]/G; [USP'U]/G
OINTMENT TOPICAL ONCE
OUTPATIENT
Start: 2023-11-14 | End: 2023-11-14

## 2023-11-14 RX ORDER — CEFDINIR 300 MG/1
300 CAPSULE ORAL 2 TIMES DAILY
COMMUNITY
Start: 2023-11-07 | End: 2023-11-15

## 2023-11-14 RX ORDER — SODIUM HYPOCHLORITE 1.25 MG/ML
SOLUTION TOPICAL DAILY
Qty: 1000 ML | Refills: 1 | Status: SHIPPED | OUTPATIENT
Start: 2023-11-14

## 2023-11-14 RX ORDER — LIDOCAINE 50 MG/G
OINTMENT TOPICAL ONCE
OUTPATIENT
Start: 2023-11-14 | End: 2023-11-14

## 2023-11-14 RX ORDER — LIDOCAINE HYDROCHLORIDE 20 MG/ML
JELLY TOPICAL ONCE
OUTPATIENT
Start: 2023-11-14 | End: 2023-11-14

## 2023-11-14 RX ORDER — TRIAMCINOLONE ACETONIDE 1 MG/G
OINTMENT TOPICAL ONCE
OUTPATIENT
Start: 2023-11-14 | End: 2023-11-14

## 2023-11-14 RX ORDER — LIDOCAINE 40 MG/G
CREAM TOPICAL ONCE
OUTPATIENT
Start: 2023-11-14 | End: 2023-11-14

## 2023-11-14 RX ORDER — BETAMETHASONE DIPROPIONATE 0.05 %
OINTMENT (GRAM) TOPICAL ONCE
OUTPATIENT
Start: 2023-11-14 | End: 2023-11-14

## 2023-11-14 RX ORDER — GENTAMICIN SULFATE 1 MG/G
OINTMENT TOPICAL ONCE
OUTPATIENT
Start: 2023-11-14 | End: 2023-11-14

## 2023-11-14 RX ORDER — LIDOCAINE 40 MG/G
CREAM TOPICAL ONCE
Status: DISCONTINUED | OUTPATIENT
Start: 2023-11-14 | End: 2023-11-15 | Stop reason: HOSPADM

## 2023-11-14 RX ORDER — GINSENG 100 MG
CAPSULE ORAL ONCE
OUTPATIENT
Start: 2023-11-14 | End: 2023-11-14

## 2023-11-14 RX ORDER — IBUPROFEN 200 MG
TABLET ORAL ONCE
OUTPATIENT
Start: 2023-11-14 | End: 2023-11-14

## 2023-11-14 NOTE — PROGRESS NOTES
Augustine Osgood  AGE: 78 y.o. GENDER: male  : 1945  TODAY'S DATE:  2023    Chief Complaint   Patient presents with    Wound Check     Follow up heel wound        HISTORY of PRESENT ILLNESS HPI     Augustine Osgood is a 78 y.o. male who presents today for wound evaluation. History of Wound: Right calcaneal ulcer  Wound Pain:  mild  Severity:  2 / 10   Wound Type:  pressure  Modifying Factors:  diabetes  Associated Signs/Symptoms:  none    Procedure Note    Performed by: Yanely Curtis MD    Consent obtained: Yes    Time out taken:  Yes    Pain Control:       Debridement:Excisional Debridement    Using curette the wound was sharply debrided    down through and including the removal of subcutaneous tissue. Devitalized Tissue Debrided:  necrotic/eschar    Pre Debridement Measurements:  Are located in the Wound Documentation Flow Sheet    Wound #: 1     Post  Debridement Measurements:  Wound 23 Heel Right #1 (Active)   Wound Image   23 1038   Wound Etiology Pressure Stage 4 23 1038   Wound Cleansed Cleansed with saline; Other (Comment) 10/31/23 1036   Offloading for Diabetic Foot Ulcers Post op shoe 10/31/23 1036   Wound Length (cm) 1.3 cm 23 1038   Wound Width (cm) 0.9 cm 23 1038   Wound Depth (cm) 0.5 cm 23 1038   Wound Surface Area (cm^2) 1.17 cm^2 23 1038   Change in Wound Size % (l*w) 92.57 23 1038   Wound Volume (cm^3) 0.585 cm^3 23 1038   Wound Healing % 81 23 1038   Post-Procedure Length (cm) 1.3 cm 23 1058   Post-Procedure Width (cm) 0.9 cm 23 1058   Post-Procedure Depth (cm) 0.5 cm 23 1058   Post-Procedure Surface Area (cm^2) 1.17 cm^2 23 1058   Post-Procedure Volume (cm^3) 0.585 cm^3 23 1058   Wound Assessment Bleeding 23 1058   Drainage Amount Moderate (25-50%) 23 1058   Drainage Description Green 23 1038   Odor None 23 1038   Clau-wound Assessment Intact 23 1038
Patient seen and treated on 11/14/2023    By Luci Dasilva MD NPI: [id number]  (provider/NPI)

## 2023-11-14 NOTE — PLAN OF CARE
Discharge instructions given. Patient verbalized understanding. Return to [address] [place] [address] in 2 week(s).   Called/faxed orders to [facility]

## 2023-11-22 NOTE — PATIENT INSTRUCTIONS
[address], [address] [facility]  Telephone: [phone]     Discharge Instructions     Important reminders:     **If you have any signs and symptoms of illness (Cough, fever, congestion, nausea, vomiting, diarrhea, etc.) please call the wound care center prior to your appointment. 1. Increase Protein intake for optimal wound healing  2. No added salt to reduce any swelling  3. If diabetic, maintain good glucose control  4. If you smoke, smoking prohibits wound healing, we ask that you refrain from smoking. 5. When taking antibiotics take the entire prescription as ordered. Do not stop taking until medication is all gone unless otherwise instructed. 6. Exercise as tolerated. 7. Keep weight off wounds and reposition every 2 hours if applicable. 8. If wound(s) is on your lower extremity, elevate legs to the level of the heart or above for 30 minutes 4-5 times a day and/or when sitting. Avoid standing for long periods of time. 9. Do not get wounds wet in bath or shower unless otherwise instructed by your physician. If your wound is on your foot or leg, you may purchase a cast bag. Please ask at the pharmacy. If Vascular testing is ordered, please call [address] [place] [address] ([phone]) to schedule. Vascular tests ordered by Wound Care Physicians may take up to 2 hours to complete. Please keep that in mind when scheduling. If Vascular testing is scheduled, please bring supplies to replace your dressing after testing is done. The vascular department does not stock supplies. Wound: Right heel      With each dressing change, rinse wounds with 0.9% Saline. (May use wound wash or soft contact solution. Both can be purchased at a local drug store). If unable to obtain saline, may use a gentle soap and water. Dressing care: Discontinue NPWT. Do not put tape on skin. Wear surgical shoe.  Santyl, moist to dry with 1/4 strength Dakins, dry

## 2023-11-28 ENCOUNTER — HOSPITAL ENCOUNTER (OUTPATIENT)
Dept: WOUND CARE | Age: 78
Discharge: HOME | End: 2023-11-28
Attending: SURGERY
Payer: MEDICARE

## 2023-11-28 VITALS
TEMPERATURE: 96.6 F | RESPIRATION RATE: 16 BRPM | SYSTOLIC BLOOD PRESSURE: 160 MMHG | HEART RATE: 78 BPM | DIASTOLIC BLOOD PRESSURE: 79 MMHG

## 2023-11-28 DIAGNOSIS — L97.422 DIABETIC ULCER OF LEFT HEEL ASSOCIATED WITH TYPE 2 DIABETES MELLITUS, WITH FAT LAYER EXPOSED (HCC): Primary | ICD-10-CM

## 2023-11-28 DIAGNOSIS — E11.621 DIABETIC ULCER OF LEFT HEEL ASSOCIATED WITH TYPE 2 DIABETES MELLITUS, WITH FAT LAYER EXPOSED (HCC): Primary | ICD-10-CM

## 2023-11-28 PROCEDURE — 11042 DBRDMT SUBQ TIS 1ST 20SQCM/<: CPT | Performed by: SURGERY

## 2023-11-28 PROCEDURE — 11042 DBRDMT SUBQ TIS 1ST 20SQCM/<: CPT

## 2023-11-28 RX ORDER — LIDOCAINE HYDROCHLORIDE 40 MG/ML
SOLUTION TOPICAL ONCE
OUTPATIENT
Start: 2023-11-28 | End: 2023-11-28

## 2023-11-28 RX ORDER — GINSENG 100 MG
CAPSULE ORAL ONCE
OUTPATIENT
Start: 2023-11-28 | End: 2023-11-28

## 2023-11-28 RX ORDER — LIDOCAINE 40 MG/G
CREAM TOPICAL ONCE
Status: DISCONTINUED | OUTPATIENT
Start: 2023-11-28 | End: 2023-11-29 | Stop reason: HOSPADM

## 2023-11-28 RX ORDER — CLOBETASOL PROPIONATE 0.5 MG/G
OINTMENT TOPICAL ONCE
OUTPATIENT
Start: 2023-11-28 | End: 2023-11-28

## 2023-11-28 RX ORDER — LIDOCAINE HYDROCHLORIDE 20 MG/ML
JELLY TOPICAL ONCE
OUTPATIENT
Start: 2023-11-28 | End: 2023-11-28

## 2023-11-28 RX ORDER — TRIAMCINOLONE ACETONIDE 1 MG/G
OINTMENT TOPICAL ONCE
OUTPATIENT
Start: 2023-11-28 | End: 2023-11-28

## 2023-11-28 RX ORDER — LIDOCAINE 40 MG/G
CREAM TOPICAL ONCE
OUTPATIENT
Start: 2023-11-28 | End: 2023-11-28

## 2023-11-28 RX ORDER — LIDOCAINE 50 MG/G
OINTMENT TOPICAL ONCE
OUTPATIENT
Start: 2023-11-28 | End: 2023-11-28

## 2023-11-28 RX ORDER — BETAMETHASONE DIPROPIONATE 0.05 %
OINTMENT (GRAM) TOPICAL ONCE
OUTPATIENT
Start: 2023-11-28 | End: 2023-11-28

## 2023-11-28 RX ORDER — BACITRACIN ZINC AND POLYMYXIN B SULFATE 500; 1000 [USP'U]/G; [USP'U]/G
OINTMENT TOPICAL ONCE
OUTPATIENT
Start: 2023-11-28 | End: 2023-11-28

## 2023-11-28 RX ORDER — IBUPROFEN 200 MG
TABLET ORAL ONCE
OUTPATIENT
Start: 2023-11-28 | End: 2023-11-28

## 2023-11-28 RX ORDER — SODIUM CHLOR/HYPOCHLOROUS ACID 0.033 %
SOLUTION, IRRIGATION IRRIGATION ONCE
OUTPATIENT
Start: 2023-11-28 | End: 2023-11-28

## 2023-11-28 RX ORDER — GENTAMICIN SULFATE 1 MG/G
OINTMENT TOPICAL ONCE
OUTPATIENT
Start: 2023-11-28 | End: 2023-11-28

## 2023-11-28 NOTE — PROGRESS NOTES
Waylon Lepe  AGE: 78 y.o. GENDER: male  : 1945  TODAY'S DATE:  2023    Chief Complaint   Patient presents with    Wound Check     RLE        HISTORY of PRESENT ILLNESS HPI     Waylon Lepe is a 78 y.o. male who presents today for wound evaluation. History of Wound: Right calcaneal ulcer  Wound Pain:  mild  Severity:  2 / 10   Wound Type:  pressure  Modifying Factors:  diabetes  Associated Signs/Symptoms:  none    Procedure Note    Performed by: Jossue Larson MD    Consent obtained: Yes    Time out taken:  Yes    Pain Control: Anesthetic  Anesthetic: 4% Lidocaine Cream     Debridement:Excisional Debridement    Using curette the wound was sharply debrided    down through and including the removal of subcutaneous tissue. Devitalized Tissue Debrided:  necrotic/eschar    Pre Debridement Measurements:  Are located in the Wound Documentation Flow Sheet    Wound #: 1     Post  Debridement Measurements:  Wound 23 Heel Right #1 (Active)   Wound Image   23 1038   Wound Etiology Pressure Unstageable 23 1016   Wound Cleansed Cleansed with saline 23 1016   Offloading for Diabetic Foot Ulcers Post op shoe 23 1016   Wound Length (cm) 0.9 cm 23 1016   Wound Width (cm) 0.8 cm 23 1016   Wound Depth (cm) 0.3 cm 23 1016   Wound Surface Area (cm^2) 0.72 cm^2 23 1016   Change in Wound Size % (l*w) 95.43 23 1016   Wound Volume (cm^3) 0.216 cm^3 23 1016   Wound Healing % 93 23 1016   Post-Procedure Length (cm) 1 cm 23 1040   Post-Procedure Width (cm) 0.8 cm 23 1040   Post-Procedure Depth (cm) 0.3 cm 23 1040   Post-Procedure Surface Area (cm^2) 0.8 cm^2 23 1040   Post-Procedure Volume (cm^3) 0.24 cm^3 23 1040   Wound Assessment Bleeding 23 1040   Drainage Amount Moderate (25-50%) 23 1040   Drainage Description Serosanguinous; Yellow 23 1016   Odor None 23 1016   Clau-wound
USE THE SUPPLIES YOU HAVE AVAILABLE UNTIL YOU ARE ABLE TO REACH US. IT IS MOST IMPORTANT TO KEEP THE WOUND COVERED AT ALL TIMES. Patient Experience     Thank you for trusting us with your care. You may receive a survey from a company called [employer] asking for your feedback. We would appreciate it if you took a few minutes to share your experience. Your input is very valuable to us. Skilled nurse to evaluate and treat for wound care. Change dressing as ordered  once a day on Monday, Tuesday, Wednesday, Thursday, Friday, Saturday, and Sunday using clean technique. Patient/Family/caregiver may change dressings as needed as instructed when Home Care unavailable. WOUNDS REQUIRING DRESSING Changes:     Wound 09/12/23 Heel Right #1 (Active)   Wound Image   11/14/23 1038   Wound Etiology Pressure Unstageable 11/28/23 1016   Wound Cleansed Cleansed with saline 11/28/23 1016   Offloading for Diabetic Foot Ulcers Post op shoe 11/28/23 1016   Wound Length (cm) 0.9 cm 11/28/23 1016   Wound Width (cm) 0.8 cm 11/28/23 1016   Wound Depth (cm) 0.3 cm 11/28/23 1016   Wound Surface Area (cm^2) 0.72 cm^2 11/28/23 1016   Change in Wound Size % (l*w) 95.43 11/28/23 1016   Wound Volume (cm^3) 0.216 cm^3 11/28/23 1016   Wound Healing % 93 11/28/23 1016   Post-Procedure Length (cm) 1 cm 11/28/23 1040   Post-Procedure Width (cm) 0.8 cm 11/28/23 1040   Post-Procedure Depth (cm) 0.3 cm 11/28/23 1040   Post-Procedure Surface Area (cm^2) 0.8 cm^2 11/28/23 1040   Post-Procedure Volume (cm^3) 0.24 cm^3 11/28/23 1040   Wound Assessment Bleeding 11/28/23 1040   Drainage Amount Moderate (25-50%) 11/28/23 1040   Drainage Description Serosanguinous; Yellow 11/28/23 1016   Odor None 11/28/23 1016   Clau-wound Assessment Dry/flaky 11/28/23 1016   Margins Defined edges 11/28/23 1016   Wound Thickness Description not for Pressure Injury Full thickness 10/31/23 1036   Number of days: 77          Patient seen and treated on 11/28/2023    By

## 2023-11-28 NOTE — PLAN OF CARE
Discharge instructions given. Patient verbalized understanding. Return to [facility] in 2 week(s).   Called/faxed orders to [facility]

## 2023-12-12 ENCOUNTER — HOSPITAL ENCOUNTER (OUTPATIENT)
Dept: WOUND CARE | Age: 78
Discharge: HOME | End: 2023-12-12
Attending: SURGERY
Payer: MEDICARE

## 2023-12-12 VITALS — SYSTOLIC BLOOD PRESSURE: 150 MMHG | HEART RATE: 77 BPM | DIASTOLIC BLOOD PRESSURE: 74 MMHG | RESPIRATION RATE: 15 BRPM

## 2023-12-12 DIAGNOSIS — L97.422 DIABETIC ULCER OF LEFT HEEL ASSOCIATED WITH TYPE 2 DIABETES MELLITUS, WITH FAT LAYER EXPOSED (HCC): Primary | ICD-10-CM

## 2023-12-12 DIAGNOSIS — E11.621 DIABETIC ULCER OF LEFT HEEL ASSOCIATED WITH TYPE 2 DIABETES MELLITUS, WITH FAT LAYER EXPOSED (HCC): Primary | ICD-10-CM

## 2023-12-12 PROCEDURE — 11042 DBRDMT SUBQ TIS 1ST 20SQCM/<: CPT | Performed by: SURGERY

## 2023-12-12 PROCEDURE — 11042 DBRDMT SUBQ TIS 1ST 20SQCM/<: CPT

## 2023-12-12 RX ORDER — SODIUM CHLOR/HYPOCHLOROUS ACID 0.033 %
SOLUTION, IRRIGATION IRRIGATION ONCE
OUTPATIENT
Start: 2023-12-12 | End: 2023-12-12

## 2023-12-12 RX ORDER — BACITRACIN ZINC AND POLYMYXIN B SULFATE 500; 1000 [USP'U]/G; [USP'U]/G
OINTMENT TOPICAL ONCE
OUTPATIENT
Start: 2023-12-12 | End: 2023-12-12

## 2023-12-12 RX ORDER — LIDOCAINE 50 MG/G
OINTMENT TOPICAL ONCE
OUTPATIENT
Start: 2023-12-12 | End: 2023-12-12

## 2023-12-12 RX ORDER — LIDOCAINE HYDROCHLORIDE 20 MG/ML
JELLY TOPICAL ONCE
OUTPATIENT
Start: 2023-12-12 | End: 2023-12-12

## 2023-12-12 RX ORDER — GINSENG 100 MG
CAPSULE ORAL ONCE
OUTPATIENT
Start: 2023-12-12 | End: 2023-12-12

## 2023-12-12 RX ORDER — LIDOCAINE HYDROCHLORIDE 40 MG/ML
SOLUTION TOPICAL ONCE
OUTPATIENT
Start: 2023-12-12 | End: 2023-12-12

## 2023-12-12 RX ORDER — TRIAMCINOLONE ACETONIDE 1 MG/G
OINTMENT TOPICAL ONCE
OUTPATIENT
Start: 2023-12-12 | End: 2023-12-12

## 2023-12-12 RX ORDER — IBUPROFEN 200 MG
TABLET ORAL ONCE
OUTPATIENT
Start: 2023-12-12 | End: 2023-12-12

## 2023-12-12 RX ORDER — LIDOCAINE 40 MG/G
CREAM TOPICAL ONCE
Status: DISCONTINUED | OUTPATIENT
Start: 2023-12-12 | End: 2023-12-13 | Stop reason: HOSPADM

## 2023-12-12 RX ORDER — GENTAMICIN SULFATE 1 MG/G
OINTMENT TOPICAL ONCE
OUTPATIENT
Start: 2023-12-12 | End: 2023-12-12

## 2023-12-12 RX ORDER — CLOBETASOL PROPIONATE 0.5 MG/G
OINTMENT TOPICAL ONCE
OUTPATIENT
Start: 2023-12-12 | End: 2023-12-12

## 2023-12-12 RX ORDER — LIDOCAINE 40 MG/G
CREAM TOPICAL ONCE
OUTPATIENT
Start: 2023-12-12 | End: 2023-12-12

## 2023-12-12 RX ORDER — BETAMETHASONE DIPROPIONATE 0.05 %
OINTMENT (GRAM) TOPICAL ONCE
OUTPATIENT
Start: 2023-12-12 | End: 2023-12-12

## 2023-12-12 NOTE — PLAN OF CARE
Discharge instructions given. Patient verbalized understanding. Return to [address] [place] [address] in 3 week(s).   Called/faxed orders to  [facility]

## 2023-12-19 ENCOUNTER — TELEPHONE (OUTPATIENT)
Dept: ORTHOPEDIC SURGERY | Age: 78
End: 2023-12-19

## 2023-12-19 NOTE — TELEPHONE ENCOUNTER
I spoke with the  SNF about if the pt is still taking Bactrim daily. She said that he tookit  from 7/18-10/18. The are no longer giving him that    Subjective:  82 year old female POD #13 from mitral clip, readmitted to the CTICU for change in mental status. When examined, patient is on BIPAP and tolerating with encouragement. Currently, no acute complaints.     Past Medical History:  Heart failure  H/o or current diagnosis of HF- Contraindication to ACEI/ARBs  H/o or current diagnosis of HF- no contraindication to ACEI/ARBs  H/o or current diagnosis of HF- ACEI/ARB contraindication unknown  H/o or current diagnosis of HF- no contraindication to ACEI/ARBs  H/o or current diagnosis of HF- Contraindication to ACEI/ARBs  H/o or current diagnosis of HF- ACEI/ARB contraindication unknown  H/o or current diagnosis of HF- ACEI/ARB contraindication unknown  Family history of hypertension  Family history of heart disease  Handoff  MEWS Score  Renal insufficiency  MR (mitral regurgitation)  Mitral valve stenosis, severe  On home oxygen therapy  Obesity  Tracheal stenosis  Cardiomyopathy  Osteoarthritis  Iron deficiency anemia  MI, old  Dyslipidemia  Acid reflux  Hx of CABG  CAD (coronary artery disease)  Heart failure  HTN (hypertension)  Congestive heart failure  Dyslipidemia  Cerebrovascular accident (CVA) due to other mechanism  Acalculous cholecystitis  Acute on chronic systolic heart failure  Coronary artery disease involving native coronary artery of native heart without angina pectoris  Acute on chronic respiratory failure with hypercapnia  Obesity, unspecified obesity severity, unspecified obesity type  Hypercapnic respiratory failure, chronic  On home oxygen therapy  Palliative care encounter  Prophylactic measure  Polyp of ascending colon, unspecified type  Delirium  Iron deficiency anemia, unspecified iron deficiency anemia type  CKD (chronic kidney disease) stage 3, GFR 30-59 ml/min  Non-rheumatic mitral regurgitation  Non-ST elevation (NSTEMI) myocardial infarction  Atrial fibrillation with RVR  Encephalopathy, unspecified  Left lower quadrant pain  Chronic obstructive pulmonary disease, unspecified COPD type  Acute on chronic systolic congestive heart failure  H/O tracheostomy  Cataract  S/P CABG x 3  SEVERE ABD PAIN  0        sodium chloride 0.9% lock flush 3milliLiter(s) IV Push every 8 hours  multivitamin 1Tablet(s) Oral daily  latanoprost 0.005% Ophthalmic Solution 1Drop(s) Both EYES at bedtime  pantoprazole    Tablet 40milliGRAM(s) Oral before breakfast  amiodarone    Tablet 200milliGRAM(s) Oral daily  metoprolol succinate ER 50milliGRAM(s) Oral daily  ALBUTerol/ipratropium for Nebulization 3milliLiter(s) Nebulizer every 6 hours  buDESOnide   0.5 milliGRAM(s) Respule 0.5milliGRAM(s) Inhalation two times a day  midodrine 10milliGRAM(s) Oral three times a day  piperacillin/tazobactam IVPB. 2.25Gram(s) IV Intermittent every 6 hours  clopidogrel Tablet 75milliGRAM(s) Oral daily  folic acid 1milliGRAM(s) Oral daily  ascorbic acid 250milliGRAM(s) Oral daily  lactobacillus acidophilus 1Tablet(s) Oral two times a day with meals  psyllium Powder 1Packet(s) Oral two times a day  loperamide 2milliGRAM(s) Oral every 4 hours PRN  iron sucrose IVPB 200milliGRAM(s) IV Intermittent every 24 hours  torsemide 20milliGRAM(s) Oral daily  MEDICATIONS  (PRN):  loperamide 2milliGRAM(s) Oral every 4 hours PRN Diarrhea      Daily     Daily       ABG - ( 22 Jun 2017 05:35 )  pH: 7.37  /  pCO2: 74    /  pO2: 74    / HCO3: 39    / Base Excess: 14.9  /  SaO2: 95                                      9.5    6.4   )-----------( 226      ( 23 Jun 2017 05:20 )             32.4   06-23    146<H>  |  97<L>  |  26.0<H>  ----------------------------<  93  4.6   |  38.0<H>  |  1.18    Ca    9.4      23 Jun 2017 05:20  Phos  3.6     06-23  Mg     2.9     06-23    TPro  7.2  /  Alb  3.8  /  TBili  0.5  /  DBili  0.1  /  AST  24  /  ALT  25  /  AlkPhos  111  06-23            Objective:  T(C): 36.7, Max: 36.7 (06-23 @ 19:15)  HR: 70 (64 - 93)  BP: 113/55 (113/55 - 157/68)  RR: 28 (22 - 48)  SpO2: 96% (92% - 100%)  Wt(kg): --CAPILLARY BLOOD GLUCOSE  I&O's Summary  I & Os for 24h ending 23 Jun 2017 07:00  =============================================  IN: 1780 ml / OUT: 600 ml / NET: 1180 ml    I & Os for current day (as of 24 Jun 2017 02:17)  =============================================  IN: 1050 ml / OUT: 0 ml / NET: 1050 ml      Physical Exam:  Neuro: awake, alert, oriented x3  Pulm: CTA b/l, no wheezing or rales  CV: S1S2, no murmurs, NSR  Abd: Soft, NT, ND, normoactive   Ext: +DP pulses, no pedal edema

## 2023-12-26 ENCOUNTER — TELEPHONE (OUTPATIENT)
Dept: ORTHOPEDIC SURGERY | Age: 78
End: 2023-12-26

## 2023-12-26 RX ORDER — SULFAMETHOXAZOLE AND TRIMETHOPRIM 800; 160 MG/1; MG/1
1 TABLET ORAL DAILY
Qty: 90 TABLET | Refills: 2 | Status: SHIPPED | OUTPATIENT
Start: 2023-12-26 | End: 2024-09-21

## 2023-12-26 NOTE — TELEPHONE ENCOUNTER
The pts wife Dylan Police called in to see if Dr Jonh Velasco can send the Bactrim script to  [facility] now that he is home

## 2023-12-26 NOTE — TELEPHONE ENCOUNTER
Returned pts call about the Bactrim script from Dr Casie Starks.  I need to know what Pharmacy they would like it sent to

## 2023-12-28 NOTE — PATIENT INSTRUCTIONS
Wednesday & Friday. Take a multivitamin & Vitamin C. Keep pressure off of heel when sitting or when in bed- get offloading boots and you can place a pillow under leg to allow heel to float. Ok for Therapy- may walk normally while in therapy from a wound standpoint. Keep pressure off of wound when sitting and in bed     Vascular Surgeon  Yony Tinoco MD  Phone: [phone] or [phone]     Home Care Agency/Facility: [facility]     Your wound-care supplies will be provided by:   Please note, depending on your insurance coverage, you may have out-of-pocket expenses for these supplies. Someone from the company should call you to confirm your order and discuss those potential costs before they ship your products -- please anticipate that call. If your out-of-pocket cost could be substantial, Many [employer] have financial hardship programs for patients who qualify, so please ask about that if you might need a hand. If you have any questions about your supplies or your potential out-of-pocket costs, or if you need to place an order for a refill of supplies (typically monthly), please call the company directly.      Your  is Asuncion Ortega up with Dr Sin In 3 week(s) in the wound care center.         Wound Care Center Information: Should you experience any significant changes in your wound(s) or have questions about your wound care, please contact the [facility] at [phone] Monday  - Thursday 8:00 am - 4:00 pm and Friday 8:00 am - 1:00pm. If you need help with your wound outside these hours and cannot wait until we are again available, contact your PCP or go to the hospital emergency room.      PLEASE NOTE: IF YOU ARE UNABLE TO OBTAIN WOUND SUPPLIES, CONTINUE TO USE THE SUPPLIES YOU HAVE AVAILABLE UNTIL YOU ARE ABLE TO REACH US. IT IS MOST IMPORTANT TO KEEP THE WOUND COVERED AT ALL TIMES.     Patient Experience     Thank you for trusting us with your care.  You

## 2024-01-02 ENCOUNTER — HOSPITAL ENCOUNTER (OUTPATIENT)
Dept: WOUND CARE | Age: 79
Discharge: HOME | End: 2024-01-02
Attending: SURGERY
Payer: MEDICARE

## 2024-01-02 VITALS
RESPIRATION RATE: 16 BRPM | TEMPERATURE: 97.8 F | SYSTOLIC BLOOD PRESSURE: 160 MMHG | HEART RATE: 71 BPM | DIASTOLIC BLOOD PRESSURE: 78 MMHG

## 2024-01-02 DIAGNOSIS — E11.621 DIABETIC ULCER OF LEFT HEEL ASSOCIATED WITH TYPE 2 DIABETES MELLITUS, WITH FAT LAYER EXPOSED (HCC): Primary | ICD-10-CM

## 2024-01-02 DIAGNOSIS — L97.422 DIABETIC ULCER OF LEFT HEEL ASSOCIATED WITH TYPE 2 DIABETES MELLITUS, WITH FAT LAYER EXPOSED (HCC): Primary | ICD-10-CM

## 2024-01-02 PROCEDURE — 99213 OFFICE O/P EST LOW 20 MIN: CPT

## 2024-01-02 PROCEDURE — 99213 OFFICE O/P EST LOW 20 MIN: CPT | Performed by: SURGERY

## 2024-01-02 RX ORDER — GINSENG 100 MG
CAPSULE ORAL ONCE
OUTPATIENT
Start: 2024-01-02 | End: 2024-01-02

## 2024-01-02 RX ORDER — LIDOCAINE HYDROCHLORIDE 40 MG/ML
SOLUTION TOPICAL ONCE
OUTPATIENT
Start: 2024-01-02 | End: 2024-01-02

## 2024-01-02 RX ORDER — CLOBETASOL PROPIONATE 0.5 MG/G
OINTMENT TOPICAL ONCE
OUTPATIENT
Start: 2024-01-02 | End: 2024-01-02

## 2024-01-02 RX ORDER — LIDOCAINE 50 MG/G
OINTMENT TOPICAL ONCE
OUTPATIENT
Start: 2024-01-02 | End: 2024-01-02

## 2024-01-02 RX ORDER — LIDOCAINE 40 MG/G
CREAM TOPICAL ONCE
OUTPATIENT
Start: 2024-01-02 | End: 2024-01-02

## 2024-01-02 RX ORDER — SODIUM CHLOR/HYPOCHLOROUS ACID 0.033 %
SOLUTION, IRRIGATION IRRIGATION ONCE
OUTPATIENT
Start: 2024-01-02 | End: 2024-01-02

## 2024-01-02 RX ORDER — TRIAMCINOLONE ACETONIDE 1 MG/G
OINTMENT TOPICAL ONCE
OUTPATIENT
Start: 2024-01-02 | End: 2024-01-02

## 2024-01-02 RX ORDER — IBUPROFEN 200 MG
TABLET ORAL ONCE
OUTPATIENT
Start: 2024-01-02 | End: 2024-01-02

## 2024-01-02 RX ORDER — GENTAMICIN SULFATE 1 MG/G
OINTMENT TOPICAL ONCE
OUTPATIENT
Start: 2024-01-02 | End: 2024-01-02

## 2024-01-02 RX ORDER — LIDOCAINE HYDROCHLORIDE 20 MG/ML
JELLY TOPICAL ONCE
OUTPATIENT
Start: 2024-01-02 | End: 2024-01-02

## 2024-01-02 RX ORDER — LIDOCAINE 40 MG/G
CREAM TOPICAL ONCE
Status: DISCONTINUED | OUTPATIENT
Start: 2024-01-02 | End: 2024-01-03 | Stop reason: HOSPADM

## 2024-01-02 RX ORDER — BETAMETHASONE DIPROPIONATE 0.05 %
OINTMENT (GRAM) TOPICAL ONCE
OUTPATIENT
Start: 2024-01-02 | End: 2024-01-02

## 2024-01-02 RX ORDER — BACITRACIN ZINC AND POLYMYXIN B SULFATE 500; 1000 [USP'U]/G; [USP'U]/G
OINTMENT TOPICAL ONCE
OUTPATIENT
Start: 2024-01-02 | End: 2024-01-02

## 2024-01-02 NOTE — PROGRESS NOTES
[facility]  [address]   [place], [place] [address]  Telephone: [phone]     FAX [phone]         [facility]    Patient Instructions   [facility]  [address]   [place], [place] [address]  Telephone: [phone]     FAX [phone]     Discharge Instructions     Important reminders:     **If you have any signs and symptoms of illness (Cough, fever, congestion, nausea, vomiting, diarrhea, etc.) please call the wound care center prior to your appointment.     1. Increase Protein intake for optimal wound healing  2. No added salt to reduce any swelling  3. If diabetic, maintain good glucose control  4. If you smoke, smoking prohibits wound healing, we ask that you refrain from smoking.  5. When taking antibiotics take the entire prescription as ordered. Do not stop taking until medication is all gone unless otherwise instructed.   6. Exercise as tolerated.   7. Keep weight off wounds and reposition every 2 hours if applicable.  8. If wound(s) is on your lower extremity, elevate legs to the level of the heart or above for 30 minutes 4-5 times a day and/or when sitting. Avoid standing for long periods of time.   9. Do not get wounds wet in bath or shower unless otherwise instructed by your physician. If your wound is on your foot or leg, you may purchase a cast bag. Please ask at the pharmacy.        If Vascular testing is ordered, please call [address] [place] [address] [phone]) to schedule.     Vascular tests ordered by Wound Care Physicians may take up to 2 hours to complete. Please keep that in mind when scheduling.      If Vascular testing is scheduled, please bring supplies to replace your dressing after testing is done. The vascular department does not stock supplies.      Wound: Right heel      With each dressing change, rinse wounds with 0.9% Saline. (May use wound wash or soft contact solution. Both can be purchased at a local drug store). If unable to obtain saline, 
Healing % 99 01/02/24 1103   Post-Procedure Length (cm) 0.5 cm 12/12/23 1036   Post-Procedure Width (cm) 0.6 cm 12/12/23 1036   Post-Procedure Depth (cm) 0.3 cm 12/12/23 1036   Post-Procedure Surface Area (cm^2) 0.3 cm^2 12/12/23 1036   Post-Procedure Volume (cm^3) 0.09 cm^3 12/12/23 1036   Wound Assessment Slough 01/02/24 1103   Drainage Amount Moderate (25-50%) 01/02/24 1103   Drainage Description Yellow 01/02/24 1103   Odor None 01/02/24 1103   Clau-wound Assessment Maceration 01/02/24 1103   Margins Defined edges 01/02/24 1103   Wound Thickness Description not for Pressure Injury Full thickness 10/31/23 1036   Number of days: 112     78-year-old male with a 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  He continues to make steady progress.  The ulcer remains open but is very small.      Plan:     Cover wound with dry bandage.  Avoid pressure to the area.  Follow-up in the wound center in 3 weeks.    Treatment Plan          Written Patient Discharge Instructions Given            Electronically signed by Kaiden Sin MD on 1/2/2024 at 5:25 PM

## 2024-01-02 NOTE — PLAN OF CARE
Discharge instructions given.  Patient verbalized understanding.  Return to [facility] in 3 week(s).  Called/faxed orders to [facility]

## 2024-01-22 NOTE — PATIENT INSTRUCTIONS
[facility]  [address]   [place], [place] [address]  Telephone: [phone]     FAX [phone]     Discharge Instructions     Important reminders:     **If you have any signs and symptoms of illness (Cough, fever, congestion, nausea, vomiting, diarrhea, etc.) please call the wound care center prior to your appointment.     1. Increase Protein intake for optimal wound healing  2. No added salt to reduce any swelling  3. If diabetic, maintain good glucose control  4. If you smoke, smoking prohibits wound healing, we ask that you refrain from smoking.  5. When taking antibiotics take the entire prescription as ordered. Do not stop taking until medication is all gone unless otherwise instructed.   6. Exercise as tolerated.   7. Keep weight off wounds and reposition every 2 hours if applicable.  8. If wound(s) is on your lower extremity, elevate legs to the level of the heart or above for 30 minutes 4-5 times a day and/or when sitting. Avoid standing for long periods of time.   9. Do not get wounds wet in bath or shower unless otherwise instructed by your physician. If your wound is on your foot or leg, you may purchase a cast bag. Please ask at the pharmacy.        If Vascular testing is ordered, please call [address] [place] [address] [phone]) to schedule.     Vascular tests ordered by Wound Care Physicians may take up to 2 hours to complete. Please keep that in mind when scheduling.      If Vascular testing is scheduled, please bring supplies to replace your dressing after testing is done. The vascular department does not stock supplies.      Wound: Right heel      With each dressing change, rinse wounds with 0.9% Saline. (May use wound wash or soft contact solution. Both can be purchased at a local drug store). If unable to obtain saline, may use a gentle soap and water.     Dressing care: Do not put tape on skin. Wear surgical shoe. Dry dressing, 1 layer tubi - change daily- home care to change Monday,

## 2024-01-23 ENCOUNTER — HOSPITAL ENCOUNTER (OUTPATIENT)
Dept: WOUND CARE | Age: 79
Discharge: HOME | End: 2024-01-23
Attending: SURGERY

## 2024-01-29 NOTE — PATIENT INSTRUCTIONS
Congratulations! You have completed your treatment program!    To prevent Pressure Ulcers from recurring again:    Inspect areas prone to pressure such as heels, elbows, back of head, shoulders, buttocks, hips, and tailbone at least daily for redness or wounds.  If any redness develops, keep pressure of that area and call your physician immediately. Do not massage reddened areas!  Moisturize your skin.  If there are areas where skin is overly moist, protect skin with a skin-sealant or ointment as prescribed by your physician.  Use absorbent pads or undergarments and clean skin as soon as it becomes soiled if incontinent of urine or stool.  Use positioning techniques if mobility is limited to minimize pressure on any 1 area.  While in bed, change body position at least every 2 hours.  While up in chair, shift body side to side at least every 15-30 minutes. Continue to use pressure relief cushions/matress as directed.  Do not use donut-shaped cushions on seats.  Reduce friction to skin by lifting rather than dragging body during position changes.  Make sure to eat protein rich foods and drink plenty of fluids unless your physician tells you otherwise.   Call the wound clinic if your wound reopens, if you develop new wounds, or if you have any other questions about follow up care [phone]    Let scab fall off naturally. If you start seeing drainage call and schedule an appointment with wound care.

## 2024-01-30 ENCOUNTER — HOSPITAL ENCOUNTER (OUTPATIENT)
Dept: WOUND CARE | Age: 79
Discharge: HOME | End: 2024-01-30
Attending: SURGERY
Payer: MEDICARE

## 2024-01-30 VITALS — DIASTOLIC BLOOD PRESSURE: 74 MMHG | SYSTOLIC BLOOD PRESSURE: 137 MMHG | HEART RATE: 67 BPM | TEMPERATURE: 97.1 F

## 2024-01-30 DIAGNOSIS — L97.422 DIABETIC ULCER OF LEFT HEEL ASSOCIATED WITH TYPE 2 DIABETES MELLITUS, WITH FAT LAYER EXPOSED (HCC): Primary | ICD-10-CM

## 2024-01-30 DIAGNOSIS — E11.621 DIABETIC ULCER OF LEFT HEEL ASSOCIATED WITH TYPE 2 DIABETES MELLITUS, WITH FAT LAYER EXPOSED (HCC): Primary | ICD-10-CM

## 2024-01-30 PROCEDURE — 99212 OFFICE O/P EST SF 10 MIN: CPT | Performed by: SURGERY

## 2024-01-30 PROCEDURE — 99212 OFFICE O/P EST SF 10 MIN: CPT

## 2024-01-30 RX ORDER — GINSENG 100 MG
CAPSULE ORAL ONCE
Status: CANCELLED | OUTPATIENT
Start: 2024-01-30 | End: 2024-01-30

## 2024-01-30 RX ORDER — LIDOCAINE 40 MG/G
CREAM TOPICAL ONCE
Status: DISCONTINUED | OUTPATIENT
Start: 2024-01-30 | End: 2024-01-31 | Stop reason: HOSPADM

## 2024-01-30 RX ORDER — SODIUM CHLOR/HYPOCHLOROUS ACID 0.033 %
SOLUTION, IRRIGATION IRRIGATION ONCE
Status: CANCELLED | OUTPATIENT
Start: 2024-01-30 | End: 2024-01-30

## 2024-01-30 RX ORDER — TRIAMCINOLONE ACETONIDE 1 MG/G
OINTMENT TOPICAL ONCE
Status: CANCELLED | OUTPATIENT
Start: 2024-01-30 | End: 2024-01-30

## 2024-01-30 RX ORDER — LIDOCAINE 40 MG/G
CREAM TOPICAL ONCE
Status: CANCELLED | OUTPATIENT
Start: 2024-01-30 | End: 2024-01-30

## 2024-01-30 RX ORDER — IBUPROFEN 200 MG
TABLET ORAL ONCE
Status: CANCELLED | OUTPATIENT
Start: 2024-01-30 | End: 2024-01-30

## 2024-01-30 RX ORDER — CLOBETASOL PROPIONATE 0.5 MG/G
OINTMENT TOPICAL ONCE
Status: CANCELLED | OUTPATIENT
Start: 2024-01-30 | End: 2024-01-30

## 2024-01-30 RX ORDER — LIDOCAINE HYDROCHLORIDE 20 MG/ML
JELLY TOPICAL ONCE
Status: CANCELLED | OUTPATIENT
Start: 2024-01-30 | End: 2024-01-30

## 2024-01-30 RX ORDER — LIDOCAINE 50 MG/G
OINTMENT TOPICAL ONCE
Status: CANCELLED | OUTPATIENT
Start: 2024-01-30 | End: 2024-01-30

## 2024-01-30 RX ORDER — BACITRACIN ZINC AND POLYMYXIN B SULFATE 500; 1000 [USP'U]/G; [USP'U]/G
OINTMENT TOPICAL ONCE
Status: CANCELLED | OUTPATIENT
Start: 2024-01-30 | End: 2024-01-30

## 2024-01-30 RX ORDER — GENTAMICIN SULFATE 1 MG/G
OINTMENT TOPICAL ONCE
Status: CANCELLED | OUTPATIENT
Start: 2024-01-30 | End: 2024-01-30

## 2024-01-30 RX ORDER — BETAMETHASONE DIPROPIONATE 0.05 %
OINTMENT (GRAM) TOPICAL ONCE
Status: CANCELLED | OUTPATIENT
Start: 2024-01-30 | End: 2024-01-30

## 2024-01-30 RX ORDER — LIDOCAINE HYDROCHLORIDE 40 MG/ML
SOLUTION TOPICAL ONCE
Status: CANCELLED | OUTPATIENT
Start: 2024-01-30 | End: 2024-01-30

## 2024-01-30 NOTE — PLAN OF CARE
Discharge instructions given.  Patient verbalized understanding.  Return to [facility] as needed  Wound healed

## 2024-01-30 NOTE — PROGRESS NOTES
[facility]  Progress Note       Ty Lino  AGE: 78 y.o.   GENDER: male  : 1945  TODAY'S DATE:  2024    Subjective:     Chief Complaint   Patient presents with    Wound Check     Right foot F/U         HISTORY of PRESENT ILLNESS HPI     Ty Lino is a 78 y.o. male who presents today for wound evaluation.   History of Wound: Right calcaneal ulcer    Wound Pain:  none  Severity:  0 / 10   Wound Type:  pressure  Modifying Factors:  diabetes  Associated Signs/Symptoms:  none        PAST MEDICAL HISTORY        Diagnosis Date    Acid reflux     CAD (coronary artery disease)     DM (diabetes mellitus), type 2 (HCC)     Hypertension     SANTI (obstructive sleep apnea)     Thyroid disease        PAST SURGICAL HISTORY    Past Surgical History:   Procedure Laterality Date    CARDIAC CATHETERIZATION      had coronary stenting in     REVISION TOTAL KNEE ARTHROPLASTY Right     REVISION TOTAL KNEE ARTHROPLASTY Right     had infected R knee-had spacer and subsequent revision in     REVISION TOTAL KNEE ARTHROPLASTY Right 3/28/2023    RIGHT KNEE HARDWARE REMOVAL, PLACEMENT OF ANTIBIOTIC SPACER performed by Mark Barton MD at [facility] OR    REVISION TOTAL KNEE ARTHROPLASTY Right 2023    RIGHT REVISION TOTAL KNEE REPLACEMENT WITH MESH -JEROME performed by Mark Barton MD at [facility] OR       FAMILY HISTORY    History reviewed. No pertinent family history.    SOCIAL HISTORY    Social History     Tobacco Use    Smoking status: Never    Smokeless tobacco: Never       ALLERGIES    Allergies   Allergen Reactions    Adhesive Tape Hives     PAPER TAPE IS OK    Ancef [Cefazolin] Hives       MEDICATIONS    Current Outpatient Medications on File Prior to Encounter   Medication Sig Dispense Refill    sulfamethoxazole-trimethoprim (BACTRIM DS;SEPTRA DS) 800-160 MG per tablet Take 1 tablet by mouth daily 90 tablet 2    sodium hypochlorite (DAKINS) 0.125 % SOLN external solution Apply topically

## 2024-03-26 ENCOUNTER — TELEPHONE (OUTPATIENT)
Dept: ORTHOPEDIC SURGERY | Age: 79
End: 2024-03-26

## 2024-03-26 NOTE — TELEPHONE ENCOUNTER
I spoke with the nurse at Dr Coon's office who would like to know if the pt needs antibiotics for dental procedure. The pt needs to have some roots work on his mouth    The Pt is already taking Bactrim. Will he still need to have the normal antibiotics as well?

## 2024-03-26 NOTE — TELEPHONE ENCOUNTER
General Question     Subject: MEDICATION QUESTION/DENTIST APPT   Patient and /or Facility Request: Ty Lino   Contact Number: [phone]    DR BENNY SANDS CALLED IN TO SEE IF SHE CAN SPEAK TO SOMEONE IN THE OFFICE ABOUT PATIENT HAVING AN DENTIST APPT AND DO HE NEED TO TAKE ANTIBIOTICS  IN ADDITIONAL TO THE CURRENT ANTIBIOTICS...     PLEASE ADVISE

## 2024-03-26 NOTE — TELEPHONE ENCOUNTER
I spoke with Alivia Alvarado nurse letting her know that the pt does  not need any other antibiotics for dental procedure while on Bactrim       Alivia understood and all questions were answered

## 2024-06-17 ENCOUNTER — OFFICE VISIT (OUTPATIENT)
Dept: ORTHOPEDIC SURGERY | Age: 79
End: 2024-06-17

## 2024-06-17 VITALS — BODY MASS INDEX: 39.99 KG/M2 | HEIGHT: 65 IN | WEIGHT: 240 LBS

## 2024-06-17 DIAGNOSIS — Z98.890 S/P RIGHT KNEE SURGERY: Primary | ICD-10-CM

## 2024-06-17 RX ORDER — SULFAMETHOXAZOLE AND TRIMETHOPRIM 800; 160 MG/1; MG/1
1 TABLET ORAL DAILY
Qty: 90 TABLET | Refills: 0 | Status: SHIPPED | OUTPATIENT
Start: 2024-06-17 | End: 2025-03-14

## 2024-06-17 NOTE — PROGRESS NOTES
Patient: Ty Lino                  : 1945   MRN: [id number]   Date of Visit: 24     Physician: Mark Barton MD.     Reason for Visit: Status post revision R TKA with mesh reconstruction on chronic supression for MRSA infection    Subjective History of Present Illness:     yT Lino is here for regularly scheduled follow-up s/p right knee revision and mesh reconstruction     He is using a cane and ambulating without limitations.      Physical Examination??: ?   General: Patient is alert and oriented x 3 and appears comfortable.   R knee: incision CDI    Able to perofrm SLR ROM 0-90    Radiographs: no new xrays.    Assessment and Plan?: The patient is progressing well approximately 12 mo s/p revision R TKA with a mesh EM reconstruction on chronic supressive antibiotics for MRSA infection    He will continue on suppressive antibiotics for his MRSA infection, bactrim. Discussed risks and benefits. I will transition this medication to his PCP for long term monitoring     ______________________      Mark Barton MD

## 2024-07-17 ENCOUNTER — TELEPHONE (OUTPATIENT)
Dept: ORTHOPEDIC SURGERY | Age: 79
End: 2024-07-17

## 2024-07-17 NOTE — TELEPHONE ENCOUNTER
Other SÁNCHEZ WITH DR SCHAFER PATIENT'S PCP DOCTOR CALLED IN AND WILL FAX OVER DETAILS OF PATIENTS KIDNEY FUNCTION. PATIENT IS TAKING BACTRUM AND THEY WANTED TO KNOW IF HE WANTED TO CONTINUE. ONCE FAX IS RECEIVED THEY WOULD LIKE A CALL BACK TO DISCUSS [phone]

## 2024-07-29 ENCOUNTER — TELEPHONE (OUTPATIENT)
Dept: ORTHOPEDIC SURGERY | Age: 79
End: 2024-07-29

## 2024-07-29 NOTE — TELEPHONE ENCOUNTER
General Question     Subject: REQUESTING A CARD FOR HIS NEW KNEE REPLACEMENT MAILED TO [facility].  Patient and /or Facility Request: Ty Lino   Contact Number: [phone]

## 2024-08-08 ENCOUNTER — OFFICE VISIT (OUTPATIENT)
Dept: INFECTIOUS DISEASES | Age: 79
End: 2024-08-08
Payer: MEDICARE

## 2024-08-08 VITALS
HEART RATE: 72 BPM | WEIGHT: 278.2 LBS | HEIGHT: 65 IN | BODY MASS INDEX: 46.35 KG/M2 | DIASTOLIC BLOOD PRESSURE: 79 MMHG | SYSTOLIC BLOOD PRESSURE: 147 MMHG | TEMPERATURE: 97.9 F | OXYGEN SATURATION: 94 %

## 2024-08-08 DIAGNOSIS — Z86.14 HISTORY OF MRSA INFECTION: ICD-10-CM

## 2024-08-08 DIAGNOSIS — T84.53XA INFECTION OF TOTAL RIGHT KNEE REPLACEMENT, INITIAL ENCOUNTER (HCC): Primary | ICD-10-CM

## 2024-08-08 PROCEDURE — G8417 CALC BMI ABV UP PARAM F/U: HCPCS | Performed by: INTERNAL MEDICINE

## 2024-08-08 PROCEDURE — 99215 OFFICE O/P EST HI 40 MIN: CPT | Performed by: INTERNAL MEDICINE

## 2024-08-08 PROCEDURE — 1036F TOBACCO NON-USER: CPT | Performed by: INTERNAL MEDICINE

## 2024-08-08 PROCEDURE — 3078F DIAST BP <80 MM HG: CPT | Performed by: INTERNAL MEDICINE

## 2024-08-08 PROCEDURE — 3077F SYST BP >= 140 MM HG: CPT | Performed by: INTERNAL MEDICINE

## 2024-08-08 PROCEDURE — G8427 DOCREV CUR MEDS BY ELIG CLIN: HCPCS | Performed by: INTERNAL MEDICINE

## 2024-08-08 PROCEDURE — 1123F ACP DISCUSS/DSCN MKR DOCD: CPT | Performed by: INTERNAL MEDICINE

## 2024-08-08 RX ORDER — ASCORBIC ACID 500 MG
500 TABLET ORAL DAILY
COMMUNITY

## 2024-08-08 RX ORDER — DOXYCYCLINE HYCLATE 100 MG
100 TABLET ORAL 2 TIMES DAILY
Qty: 60 TABLET | Refills: 11 | Status: SHIPPED | OUTPATIENT
Start: 2024-08-08 | End: 2024-09-07

## 2024-08-08 NOTE — PROGRESS NOTES
Infectious Diseases Oupatient Follow-up Note    Primary Care Physician:   Bhavna Coon MD  Last visit:     4/3/23  History Obtained From:    Patient, EPIC    CHIEF COMPLAINT / ID problem:     Chief Complaint   Patient presents with    Follow-up       HISTORY OF PRESENT ILLNESS / Interval history:      Ty Lino is a 79 y.o. male who was seen today for   R knee PJI, hosp f/u    8/8/24 Office Visit     PMH - obesity (BMI 47), DM, HTN, CAD, OA, SANTI, hypothyroid  PSurgHx - R TKA, R TKA 2 stage (space / revision 2013)    Admit 3/25/23 -   BC MRSA / Methicillin-Resistant Staphylococcus aureus   Antibiotic Interpretation Microscan    ceFAZolin Resistant 16 mcg/mL   clindamycin Sensitive <=0.5 mcg/mL   DAPTOmycin Sensitive <=0.5 mcg/mL   erythromycin Resistant >4 mcg/mL   linezolid Sensitive 2 mcg/mL   oxacillin Resistant >2 mcg/mL   tetracycline Sensitive <=4 mcg/mL   trimethoprim-sulfamethoxazole Sensitive <=0.5/9.5 mcg/mL   vancomycin Sensitive 1 mcg/mL     R knee asp - WBC 14,672 - 94%, RBC 6,100. GS GPC, cult S aureus / MRSA  R TKA resection 3/28,    Rx Daptomycin 750 daily through 5/9/24    R knee reimplantation 7/12/23 ('Status post revision R TKA with mesh reconstruction')    Pt has been on po Bactrim  Pt has had increase in Cr - last 1.47 on 7/11/24    Past Medical History:    Past Medical History:   Diagnosis Date    Acid reflux     CAD (coronary artery disease)     DM (diabetes mellitus), type 2 (HCC)     Hypertension     SANTI (obstructive sleep apnea)     Thyroid disease        Past Surgical History:    Past Surgical History:   Procedure Laterality Date    CARDIAC CATHETERIZATION      had coronary stenting in 2008    REVISION TOTAL KNEE ARTHROPLASTY Right     REVISION TOTAL KNEE ARTHROPLASTY Right     had infected R knee-had spacer and subsequent revision in 2013    REVISION TOTAL KNEE ARTHROPLASTY Right 3/28/2023    RIGHT KNEE HARDWARE REMOVAL, PLACEMENT OF ANTIBIOTIC SPACER performed by Mark WALTERS

## 2024-10-10 ENCOUNTER — TELEMEDICINE (OUTPATIENT)
Dept: INFECTIOUS DISEASES | Age: 79
End: 2024-10-10

## 2024-10-10 DIAGNOSIS — T84.53XA INFECTION OF TOTAL RIGHT KNEE REPLACEMENT, INITIAL ENCOUNTER (HCC): Primary | ICD-10-CM

## 2024-10-10 DIAGNOSIS — Z86.14 HISTORY OF MRSA INFECTION: ICD-10-CM

## 2024-10-10 NOTE — PROGRESS NOTES
palpitations.  Denies n / v / abd pain.  No diarrhea.  Denies dysuria or change in urinary function.  Denies joint swelling or pain.  No myalgia, arthralgia.  Denies skin change, rash, itching  Denies focal weakness, sensory change or other neurologic symptom  Denies new depression or other psychiatric problem.  No symptoms endocrine disorder.  No symptoms hematologic disorder.    PHYSICAL EXAM:    Vitals:    There were no vitals taken for this visit.    GENERAL: No apparent distress.    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 -   R knee PJI (asp - WBC 14,672 - 94%, cult S aureus / MRSA)  R TKA resection 3/28,  Rx Daptomycin 750 daily through 5/9/24    Reimplantation 7/12/23  Suppression w Bactrim recommended by Ortho  Elevation Cr - last 1.47    Discussed options --   - cont Bactrim, may not be 'causing' increase in Cr, more likely DM, HTN, monitor Cr, K+   - stop Bactrim, no further antibiotics, did have resection, does not necessary need antibiotics   - stop Bactrim, start Doxycycline, reviewed poss side effects   [GI upset, photosensitivity, administration (sitting up w water), avoid MVI/Ca++/Fe++ at same time as med]    RECOMMENDATIONS:      Cont Doxycycline 100 mg po bid   Call if any problem with Doxycyline    Discussed antibiotic suppression, RBA  Return visit 6 - 12 mo    - Spent 40 minutes on visit (including history, physical exam, review of data, development and implementation of treatment plan and coordination of care.   - Over 50% of time spent in pt counseling and education.    Carson Delgadillo,

## 2024-12-12 ENCOUNTER — TELEMEDICINE (OUTPATIENT)
Dept: INFECTIOUS DISEASES | Age: 79
End: 2024-12-12

## 2024-12-12 DIAGNOSIS — Z86.14 HISTORY OF MRSA INFECTION: ICD-10-CM

## 2024-12-12 DIAGNOSIS — T84.53XA INFECTION OF TOTAL RIGHT KNEE REPLACEMENT, INITIAL ENCOUNTER (HCC): Primary | ICD-10-CM

## 2024-12-12 RX ORDER — DOXYCYCLINE 100 MG/1
100 CAPSULE ORAL 2 TIMES DAILY
COMMUNITY

## 2024-12-12 NOTE — PROGRESS NOTES
Infectious Diseases Oupatient Follow-up Note    Primary Care Physician:   Bhavna Coon MD  Last visit:     4/3/23  History Obtained From:    Patient, EPIC    CHIEF COMPLAINT / ID problem:     Chief Complaint   Patient presents with    Follow-up     R knee replacement  Fell 10 days ago, knee gave out   No other injuries, just a little stiffness in knee  works self out with walking       HISTORY OF PRESENT ILLNESS / Interval history:      Ty Lino is a 79 y.o. male who was seen today for   R knee PJI, hosp f/u    MOST RECENT VISIT ON TOP:    12/12/24 Office Visit  Ty Lino, was evaluated through a synchronous (real-time) audio-video encounter. The patient (or guardian if applicable) is aware that this is a billable service, which includes applicable co-pays. This Virtual Visit was conducted with patient's (and/or legal guardian's) consent. Patient identification was verified, and a caregiver was present when appropriate.   The patient was located at Home: [address] [place] [address] [address]  [place] [address] [place] [address]  Provider was located at Facility (Appt Dept): [address] [place] [address]  [address] [place] [address]  Confirm you are appropriately licensed, registered, or certified to deliver care in the [facility] where the patient is located as indicated above. If you are not or unsure, please re-schedule the visit: Yes, I confirm.      Pt reports he is taking and tolerating Doxycycyline 100 bid   -  less lightheadedness (see previous visit)  Had recent fall.  R knee is good, no pain  No further f/u with Dr Barton has appt in 2025    10/10/24 Office Visit  Virtual Visit      Pt reports he is taking and tolerating Doxycycyline 100 bid   -  Has had lightheadedness with standing  R knee is good, no pain  No further f/u with Dr Barton    8/8/24 Office Visit     PMH - obesity (BMI 47), DM, HTN, CAD, OA, SANTI, hypothyroid  PSurgHx - R TKA, R TKA 2 stage (space / revision 2013)    Admit 3/25/23 -   BC MRSA /

## 2025-01-05 ENCOUNTER — APPOINTMENT (OUTPATIENT)
Dept: GENERAL RADIOLOGY | Age: 80
DRG: 493 | End: 2025-01-05
Attending: INTERNAL MEDICINE
Payer: MEDICARE

## 2025-01-05 ENCOUNTER — HOSPITAL ENCOUNTER (INPATIENT)
Age: 80
LOS: 3 days | Discharge: NURSING, REHAB OR LONG-TERM CARE | DRG: 493 | End: 2025-01-08
Attending: INTERNAL MEDICINE | Admitting: SURGERY
Payer: MEDICARE

## 2025-01-05 PROBLEM — S82.201A TIBIA/FIBULA FRACTURE, RIGHT, CLOSED, INITIAL ENCOUNTER: Status: ACTIVE | Noted: 2025-01-05

## 2025-01-05 PROBLEM — S82.401A TIBIA/FIBULA FRACTURE, RIGHT, CLOSED, INITIAL ENCOUNTER: Status: ACTIVE | Noted: 2025-01-05

## 2025-01-05 PROBLEM — S82.402A CLOSED FRACTURE OF LEFT TIBIA AND FIBULA, INITIAL ENCOUNTER: Status: ACTIVE | Noted: 2025-01-05

## 2025-01-05 PROBLEM — S82.202A CLOSED FRACTURE OF LEFT TIBIA AND FIBULA, INITIAL ENCOUNTER: Status: ACTIVE | Noted: 2025-01-05

## 2025-01-05 PROBLEM — Z87.81 S/P TIBIAL FRACTURE: Status: ACTIVE | Noted: 2025-01-05

## 2025-01-05 LAB
GLUCOSE BLD-MCNC: 139 MG/DL (ref 70–99)
GLUCOSE BLD-MCNC: 213 MG/DL (ref 70–99)
PERFORMED ON: ABNORMAL
PERFORMED ON: ABNORMAL

## 2025-01-05 PROCEDURE — 73590 X-RAY EXAM OF LOWER LEG: CPT

## 2025-01-05 PROCEDURE — 6360000002 HC RX W HCPCS: Performed by: SURGERY

## 2025-01-05 PROCEDURE — 2060000000 HC ICU INTERMEDIATE R&B

## 2025-01-05 PROCEDURE — 73560 X-RAY EXAM OF KNEE 1 OR 2: CPT

## 2025-01-05 PROCEDURE — 6370000000 HC RX 637 (ALT 250 FOR IP): Performed by: SURGERY

## 2025-01-05 PROCEDURE — 2500000003 HC RX 250 WO HCPCS: Performed by: SURGERY

## 2025-01-05 PROCEDURE — 73610 X-RAY EXAM OF ANKLE: CPT

## 2025-01-05 RX ORDER — ACETAMINOPHEN 650 MG/1
650 SUPPOSITORY RECTAL EVERY 6 HOURS PRN
Status: DISCONTINUED | OUTPATIENT
Start: 2025-01-05 | End: 2025-01-08 | Stop reason: HOSPADM

## 2025-01-05 RX ORDER — MAGNESIUM SULFATE IN WATER 40 MG/ML
2000 INJECTION, SOLUTION INTRAVENOUS PRN
Status: DISCONTINUED | OUTPATIENT
Start: 2025-01-05 | End: 2025-01-08 | Stop reason: HOSPADM

## 2025-01-05 RX ORDER — GLUCAGON 1 MG/ML
1 KIT INJECTION PRN
Status: DISCONTINUED | OUTPATIENT
Start: 2025-01-05 | End: 2025-01-08 | Stop reason: HOSPADM

## 2025-01-05 RX ORDER — LEVOTHYROXINE SODIUM 112 UG/1
112 TABLET ORAL DAILY
Status: DISCONTINUED | OUTPATIENT
Start: 2025-01-06 | End: 2025-01-08 | Stop reason: HOSPADM

## 2025-01-05 RX ORDER — SODIUM CHLORIDE 9 MG/ML
INJECTION, SOLUTION INTRAVENOUS PRN
Status: DISCONTINUED | OUTPATIENT
Start: 2025-01-05 | End: 2025-01-08 | Stop reason: HOSPADM

## 2025-01-05 RX ORDER — CALCIUM CARBONATE 500(1250)
500 TABLET ORAL DAILY
Status: DISCONTINUED | OUTPATIENT
Start: 2025-01-05 | End: 2025-01-08 | Stop reason: HOSPADM

## 2025-01-05 RX ORDER — POLYETHYLENE GLYCOL 3350 17 G/17G
17 POWDER, FOR SOLUTION ORAL DAILY PRN
Status: DISCONTINUED | OUTPATIENT
Start: 2025-01-05 | End: 2025-01-08 | Stop reason: HOSPADM

## 2025-01-05 RX ORDER — ONDANSETRON 2 MG/ML
4 INJECTION INTRAMUSCULAR; INTRAVENOUS EVERY 6 HOURS PRN
Status: DISCONTINUED | OUTPATIENT
Start: 2025-01-05 | End: 2025-01-08 | Stop reason: HOSPADM

## 2025-01-05 RX ORDER — ACETAMINOPHEN 325 MG/1
650 TABLET ORAL EVERY 6 HOURS PRN
Status: DISCONTINUED | OUTPATIENT
Start: 2025-01-05 | End: 2025-01-08 | Stop reason: HOSPADM

## 2025-01-05 RX ORDER — ATORVASTATIN CALCIUM 10 MG/1
10 TABLET, FILM COATED ORAL NIGHTLY
Status: DISCONTINUED | OUTPATIENT
Start: 2025-01-05 | End: 2025-01-08 | Stop reason: HOSPADM

## 2025-01-05 RX ORDER — DOXYCYCLINE 50 MG/1
100 CAPSULE ORAL 2 TIMES DAILY
Status: DISCONTINUED | OUTPATIENT
Start: 2025-01-05 | End: 2025-01-08 | Stop reason: HOSPADM

## 2025-01-05 RX ORDER — METOPROLOL SUCCINATE 100 MG/1
100 TABLET, EXTENDED RELEASE ORAL DAILY
Status: DISCONTINUED | OUTPATIENT
Start: 2025-01-06 | End: 2025-01-08 | Stop reason: HOSPADM

## 2025-01-05 RX ORDER — INSULIN LISPRO 100 [IU]/ML
0-8 INJECTION, SOLUTION INTRAVENOUS; SUBCUTANEOUS
Status: DISCONTINUED | OUTPATIENT
Start: 2025-01-05 | End: 2025-01-08 | Stop reason: HOSPADM

## 2025-01-05 RX ORDER — ENOXAPARIN SODIUM 100 MG/ML
40 INJECTION SUBCUTANEOUS NIGHTLY
Status: DISCONTINUED | OUTPATIENT
Start: 2025-01-06 | End: 2025-01-08 | Stop reason: HOSPADM

## 2025-01-05 RX ORDER — POTASSIUM CHLORIDE 7.45 MG/ML
10 INJECTION INTRAVENOUS PRN
Status: DISCONTINUED | OUTPATIENT
Start: 2025-01-05 | End: 2025-01-08 | Stop reason: HOSPADM

## 2025-01-05 RX ORDER — SENNA AND DOCUSATE SODIUM 50; 8.6 MG/1; MG/1
2 TABLET, FILM COATED ORAL DAILY
Status: DISCONTINUED | OUTPATIENT
Start: 2025-01-05 | End: 2025-01-08 | Stop reason: HOSPADM

## 2025-01-05 RX ORDER — HYDROMORPHONE HYDROCHLORIDE 1 MG/ML
0.5 INJECTION, SOLUTION INTRAMUSCULAR; INTRAVENOUS; SUBCUTANEOUS
Status: DISCONTINUED | OUTPATIENT
Start: 2025-01-05 | End: 2025-01-08 | Stop reason: HOSPADM

## 2025-01-05 RX ORDER — SODIUM CHLORIDE 0.9 % (FLUSH) 0.9 %
5-40 SYRINGE (ML) INJECTION EVERY 12 HOURS SCHEDULED
Status: DISCONTINUED | OUTPATIENT
Start: 2025-01-05 | End: 2025-01-08 | Stop reason: HOSPADM

## 2025-01-05 RX ORDER — INSULIN GLARGINE 100 [IU]/ML
35 INJECTION, SOLUTION SUBCUTANEOUS DAILY
Status: DISCONTINUED | OUTPATIENT
Start: 2025-01-06 | End: 2025-01-08 | Stop reason: HOSPADM

## 2025-01-05 RX ORDER — ONDANSETRON 4 MG/1
4 TABLET, ORALLY DISINTEGRATING ORAL EVERY 8 HOURS PRN
Status: DISCONTINUED | OUTPATIENT
Start: 2025-01-05 | End: 2025-01-08 | Stop reason: HOSPADM

## 2025-01-05 RX ORDER — DEXTROSE MONOHYDRATE 100 MG/ML
INJECTION, SOLUTION INTRAVENOUS CONTINUOUS PRN
Status: DISCONTINUED | OUTPATIENT
Start: 2025-01-05 | End: 2025-01-08 | Stop reason: HOSPADM

## 2025-01-05 RX ORDER — HYDROMORPHONE HYDROCHLORIDE 1 MG/ML
0.25 INJECTION, SOLUTION INTRAMUSCULAR; INTRAVENOUS; SUBCUTANEOUS
Status: DISCONTINUED | OUTPATIENT
Start: 2025-01-05 | End: 2025-01-08 | Stop reason: HOSPADM

## 2025-01-05 RX ORDER — POTASSIUM CHLORIDE 1500 MG/1
40 TABLET, EXTENDED RELEASE ORAL PRN
Status: DISCONTINUED | OUTPATIENT
Start: 2025-01-05 | End: 2025-01-08 | Stop reason: HOSPADM

## 2025-01-05 RX ORDER — SODIUM CHLORIDE 0.9 % (FLUSH) 0.9 %
5-40 SYRINGE (ML) INJECTION PRN
Status: DISCONTINUED | OUTPATIENT
Start: 2025-01-05 | End: 2025-01-08 | Stop reason: HOSPADM

## 2025-01-05 RX ADMIN — HYDROMORPHONE HYDROCHLORIDE 0.5 MG: 1 INJECTION, SOLUTION INTRAMUSCULAR; INTRAVENOUS; SUBCUTANEOUS at 22:22

## 2025-01-05 RX ADMIN — HYDROMORPHONE HYDROCHLORIDE 0.5 MG: 1 INJECTION, SOLUTION INTRAMUSCULAR; INTRAVENOUS; SUBCUTANEOUS at 17:45

## 2025-01-05 RX ADMIN — DOXYCYCLINE 100 MG: 50 CAPSULE ORAL at 19:43

## 2025-01-05 RX ADMIN — SODIUM CHLORIDE, PRESERVATIVE FREE 10 ML: 5 INJECTION INTRAVENOUS at 19:47

## 2025-01-05 RX ADMIN — INSULIN LISPRO 2 UNITS: 100 INJECTION, SOLUTION INTRAVENOUS; SUBCUTANEOUS at 19:55

## 2025-01-05 RX ADMIN — ATORVASTATIN CALCIUM 10 MG: 10 TABLET, FILM COATED ORAL at 19:43

## 2025-01-05 ASSESSMENT — PAIN DESCRIPTION - DESCRIPTORS
DESCRIPTORS: ACHING;DISCOMFORT
DESCRIPTORS: ACHING;DISCOMFORT

## 2025-01-05 ASSESSMENT — PAIN SCALES - GENERAL
PAINLEVEL_OUTOF10: 7
PAINLEVEL_OUTOF10: 7
PAINLEVEL_OUTOF10: 0
PAINLEVEL_OUTOF10: 3

## 2025-01-05 ASSESSMENT — PAIN DESCRIPTION - PAIN TYPE
TYPE: ACUTE PAIN
TYPE: ACUTE PAIN

## 2025-01-05 ASSESSMENT — PAIN DESCRIPTION - FREQUENCY
FREQUENCY: CONTINUOUS
FREQUENCY: CONTINUOUS

## 2025-01-05 ASSESSMENT — PAIN DESCRIPTION - ONSET
ONSET: ON-GOING
ONSET: ON-GOING

## 2025-01-05 ASSESSMENT — PAIN DESCRIPTION - ORIENTATION
ORIENTATION: RIGHT
ORIENTATION: RIGHT

## 2025-01-05 ASSESSMENT — PAIN DESCRIPTION - LOCATION
LOCATION: LEG
LOCATION: LEG

## 2025-01-05 NOTE — PLAN OF CARE
Problem: Chronic Conditions and Co-morbidities  Goal: Patient's chronic conditions and co-morbidity symptoms are monitored and maintained or improved  Outcome: Progressing   Reinforcement of disease process and treatment plan recommended for chronic conditions and co-morbidities.      Problem: Discharge Planning  Goal: Discharge to home or other facility with appropriate resources  Outcome: Progressing  Patient actively participates in ADL's and decision making regarding plan of care.     Problem: Safety - Adult  Goal: Free from fall injury  Outcome: Progressing   No falls/injuries this shift, bed in lowest position, brakes on, bed alarm on, call light in reach, side rails up x2.

## 2025-01-05 NOTE — PLAN OF CARE
[facility] [place]  Transfer center [facility] received  Case reviewed with ER physician  Reason for Transfer:  Ty Lino 79 y.o. male  - p/w mechanical fall and found to have right mid to distal comminuted tibia-fibula fracture.  -Patient has history of right knee replacement 2013 which was complicated by infection and had a revision done subsequently.  Right knee since then gives away and he uses cane for ambulation.  While going down to the stairs today, at the end of it, he felt like his right leg gave out and had a fall.  His wife noted his right leg to be deformed afterwards.  -On aspirin and Plavix.  CT head unremarkable.         Prelim diagnosis: Right mid to distal comminuted tibia-fibula fracture     Patient has been accepted for transfer to [facility].   Once patient arrive please page ON CALL HOSPITALIST so patient can be seen.   If unable to reach physician on [employer] please call hospitalist phone (#[phone])     PCP: Bhavna Coon MD     Thanks  Zoraida Mtz MD  Hospitalist

## 2025-01-05 NOTE — PLAN OF CARE
[facility] Orthopedic Surgery  Consult Note          Orthopedic Consult, full note to follow tomorrow.    Ty Lino 79 y.o. admitted as a transfer from [facility] for right leg and ankle injury from a fall. He had revision right TKA for infection by Dr Barton in 2023.    Xray reviewed, and showed comminuted  right distal tibia and shaft fracture distal to Tibia stem.    Plan:  - Recommend ORIF right distal tibia and shaft fracture.  - Surgery tomorrow if medically stable.    Thank you very much for the kind consultation and allowing me to participate in this patient's care.  I will continue to keep you apprised of his progress.         Christiano Conway MD, 1/5/2025 6:08 PM

## 2025-01-05 NOTE — H&P
docusate sodium (COLACE) 100 MG capsule Take 1 capsule by mouth daily  Patient not taking: Reported on 12/12/2024    Chantel Mcneil MD   glucagon 1 MG injection Inject 1 mg into the muscle once    Chatnel Mcenil MD   guaiFENesin (ROBITUSSIN) 200 MG/10ML LIQD oral solution Take 10 mLs by mouth every 4 hours as needed for Cough  Patient not taking: Reported on 12/12/2024    Chantel Mcneil MD   ipratropium 0.5 mg-albuterol 2.5 mg (DUONEB) 0.5-2.5 (3) MG/3ML SOLN nebulizer solution Inhale 3 mLs into the lungs every 4 hours  Patient not taking: Reported on 12/12/2024    Chantel Mcneil MD   polyethylene glycol (GLYCOLAX) 17 g packet Take 1 packet by mouth daily as needed for Constipation  Patient not taking: Reported on 12/12/2024    Chantel Mcneil MD   pantoprazole (PROTONIX) 20 MG tablet Take 1 tablet by mouth daily  Patient not taking: Reported on 12/12/2024    Chantel Mcneil MD   senna (SENOKOT) 8.6 MG tablet Take 1 tablet by mouth daily  Patient not taking: Reported on 12/12/2024    Chantel Mcneil MD   calcium citrate (CALCITRATE) 950 (200 Ca) MG tablet Take 1 tablet by mouth daily    Chantel Mcneil MD   ondansetron (ZOFRAN) 4 MG tablet Take 1 tablet by mouth every 8 hours as needed for Nausea or Vomiting  Patient not taking: Reported on 12/12/2024    Chantel Mcneil MD   vitamin D 25 MCG (1000 UT) CAPS Take 1 capsule by mouth 2 times daily    Chantel Mcneil MD   clopidogrel (PLAVIX) 75 MG tablet  12/16/22   Chantel Mcneil MD   diclofenac sodium (VOLTAREN) 1 % GEL   0 Refill(s)  Patient not taking: Reported on 12/12/2024 1/21/23   Chantel Mcneil MD   insulin glargine (LANTUS SOLOSTAR) 100 UNIT/ML injection pen   See Instructions, INJECT SUBCUTANEOUSLY 44  UNITS DAILY, # 45 mL, 1 Refill(s), Pharmacy: [facility]), INJECT SUBCUTANEOUSLY 44  UNITS DAILY 12/27/22   Chantel Mcneil MD   insulin lispro

## 2025-01-06 ENCOUNTER — ANESTHESIA EVENT (OUTPATIENT)
Dept: OPERATING ROOM | Age: 80
DRG: 493 | End: 2025-01-06
Payer: MEDICARE

## 2025-01-06 ENCOUNTER — APPOINTMENT (OUTPATIENT)
Dept: GENERAL RADIOLOGY | Age: 80
DRG: 493 | End: 2025-01-06
Attending: INTERNAL MEDICINE
Payer: MEDICARE

## 2025-01-06 ENCOUNTER — ANESTHESIA (OUTPATIENT)
Dept: OPERATING ROOM | Age: 80
DRG: 493 | End: 2025-01-06
Payer: MEDICARE

## 2025-01-06 PROBLEM — M97.11XA PERIPROSTHETIC FRACTURE AROUND INTERNAL PROSTHETIC RIGHT KNEE JOINT: Status: ACTIVE | Noted: 2025-01-06

## 2025-01-06 PROBLEM — S82.251A CLOSED DISPLACED COMMINUTED FRACTURE OF SHAFT OF RIGHT TIBIA: Status: ACTIVE | Noted: 2025-01-06

## 2025-01-06 PROBLEM — S82.301A CLOSED FRACTURE OF RIGHT DISTAL TIBIA: Status: ACTIVE | Noted: 2025-01-06

## 2025-01-06 LAB
GLUCOSE BLD-MCNC: 111 MG/DL (ref 70–99)
GLUCOSE BLD-MCNC: 152 MG/DL (ref 70–99)
GLUCOSE BLD-MCNC: 91 MG/DL (ref 70–99)
PERFORMED ON: ABNORMAL
PERFORMED ON: ABNORMAL
PERFORMED ON: NORMAL

## 2025-01-06 PROCEDURE — 3600000004 HC SURGERY LEVEL 4 BASE: Performed by: ORTHOPAEDIC SURGERY

## 2025-01-06 PROCEDURE — C1713 ANCHOR/SCREW BN/BN,TIS/BN: HCPCS | Performed by: ORTHOPAEDIC SURGERY

## 2025-01-06 PROCEDURE — 6370000000 HC RX 637 (ALT 250 FOR IP): Performed by: ORTHOPAEDIC SURGERY

## 2025-01-06 PROCEDURE — 2500000003 HC RX 250 WO HCPCS: Performed by: NURSE ANESTHETIST, CERTIFIED REGISTERED

## 2025-01-06 PROCEDURE — 0QSG04Z REPOSITION RIGHT TIBIA WITH INTERNAL FIXATION DEVICE, OPEN APPROACH: ICD-10-PCS | Performed by: ORTHOPAEDIC SURGERY

## 2025-01-06 PROCEDURE — 5A09357 ASSISTANCE WITH RESPIRATORY VENTILATION, LESS THAN 24 CONSECUTIVE HOURS, CONTINUOUS POSITIVE AIRWAY PRESSURE: ICD-10-PCS

## 2025-01-06 PROCEDURE — 2500000003 HC RX 250 WO HCPCS: Performed by: ORTHOPAEDIC SURGERY

## 2025-01-06 PROCEDURE — 6360000002 HC RX W HCPCS

## 2025-01-06 PROCEDURE — 2500000003 HC RX 250 WO HCPCS

## 2025-01-06 PROCEDURE — 6360000002 HC RX W HCPCS: Performed by: ORTHOPAEDIC SURGERY

## 2025-01-06 PROCEDURE — C1769 GUIDE WIRE: HCPCS | Performed by: ORTHOPAEDIC SURGERY

## 2025-01-06 PROCEDURE — 2580000003 HC RX 258

## 2025-01-06 PROCEDURE — 1200000000 HC SEMI PRIVATE

## 2025-01-06 PROCEDURE — 73590 X-RAY EXAM OF LOWER LEG: CPT

## 2025-01-06 PROCEDURE — 2500000003 HC RX 250 WO HCPCS: Performed by: SURGERY

## 2025-01-06 PROCEDURE — 3600000014 HC SURGERY LEVEL 4 ADDTL 15MIN: Performed by: ORTHOPAEDIC SURGERY

## 2025-01-06 PROCEDURE — 94761 N-INVAS EAR/PLS OXIMETRY MLT: CPT

## 2025-01-06 PROCEDURE — 2580000003 HC RX 258: Performed by: ORTHOPAEDIC SURGERY

## 2025-01-06 PROCEDURE — 3700000000 HC ANESTHESIA ATTENDED CARE: Performed by: ORTHOPAEDIC SURGERY

## 2025-01-06 PROCEDURE — 7100000000 HC PACU RECOVERY - FIRST 15 MIN: Performed by: ORTHOPAEDIC SURGERY

## 2025-01-06 PROCEDURE — 6370000000 HC RX 637 (ALT 250 FOR IP): Performed by: SURGERY

## 2025-01-06 PROCEDURE — 3700000001 HC ADD 15 MINUTES (ANESTHESIA): Performed by: ORTHOPAEDIC SURGERY

## 2025-01-06 PROCEDURE — 2709999900 HC NON-CHARGEABLE SUPPLY: Performed by: ORTHOPAEDIC SURGERY

## 2025-01-06 PROCEDURE — 7100000001 HC PACU RECOVERY - ADDTL 15 MIN: Performed by: ORTHOPAEDIC SURGERY

## 2025-01-06 PROCEDURE — 94660 CPAP INITIATION&MGMT: CPT

## 2025-01-06 PROCEDURE — 2720000010 HC SURG SUPPLY STERILE: Performed by: ORTHOPAEDIC SURGERY

## 2025-01-06 DEVICE — KREULOCK COMPRESSION SCREW SS 2.7X38MM: Type: IMPLANTABLE DEVICE | Site: TIBIA | Status: FUNCTIONAL

## 2025-01-06 DEVICE — SCREW BNE L26MM DIA3.5MM CORT ANK S STL NONLOCKING LO PROF: Type: IMPLANTABLE DEVICE | Site: TIBIA | Status: FUNCTIONAL

## 2025-01-06 DEVICE — SCREW BNE L40MM DIA3.5MM CORT FT ANK S STL NONLOCKING LO: Type: IMPLANTABLE DEVICE | Site: TIBIA | Status: FUNCTIONAL

## 2025-01-06 DEVICE — KREULOCK COMPRESSION SCREW SS 3.5X16MM: Type: IMPLANTABLE DEVICE | Site: TIBIA | Status: FUNCTIONAL

## 2025-01-06 DEVICE — SCREW BNE L24MM DIA3.5MM CORT ANK S STL NONLOCKING LO PROF: Type: IMPLANTABLE DEVICE | Site: TIBIA | Status: FUNCTIONAL

## 2025-01-06 DEVICE — IMPLANTABLE DEVICE: Type: IMPLANTABLE DEVICE | Site: TIBIA | Status: FUNCTIONAL

## 2025-01-06 DEVICE — SCREW BONE L40MM DIA2.7MM CORT ANK S STL NONLOCKING LO PROF: Type: IMPLANTABLE DEVICE | Site: TIBIA | Status: FUNCTIONAL

## 2025-01-06 DEVICE — SCREW KREULOCK COMPRESSION SS 3.5 X 14MM: Type: IMPLANTABLE DEVICE | Site: TIBIA | Status: FUNCTIONAL

## 2025-01-06 DEVICE — SCREW BNE L34MM DIA3.5MM CORT ANK S STL NONLOCKING FULL: Type: IMPLANTABLE DEVICE | Site: TIBIA | Status: FUNCTIONAL

## 2025-01-06 DEVICE — KREULOCK COMPRESSION SCREW SS 2.7X34MM: Type: IMPLANTABLE DEVICE | Site: TIBIA | Status: FUNCTIONAL

## 2025-01-06 DEVICE — SCREW KREULOCK COMPRESSION SS 3.5 X 12MM: Type: IMPLANTABLE DEVICE | Site: TIBIA | Status: FUNCTIONAL

## 2025-01-06 RX ORDER — SODIUM CHLORIDE, SODIUM LACTATE, POTASSIUM CHLORIDE, CALCIUM CHLORIDE 600; 310; 30; 20 MG/100ML; MG/100ML; MG/100ML; MG/100ML
INJECTION, SOLUTION INTRAVENOUS
Status: DISCONTINUED | OUTPATIENT
Start: 2025-01-06 | End: 2025-01-06 | Stop reason: SDUPTHER

## 2025-01-06 RX ORDER — HYDROMORPHONE HYDROCHLORIDE 1 MG/ML
0.5 INJECTION, SOLUTION INTRAMUSCULAR; INTRAVENOUS; SUBCUTANEOUS EVERY 5 MIN PRN
Status: DISCONTINUED | OUTPATIENT
Start: 2025-01-06 | End: 2025-01-06 | Stop reason: HOSPADM

## 2025-01-06 RX ORDER — MEPERIDINE HYDROCHLORIDE 25 MG/ML
12.5 INJECTION INTRAMUSCULAR; INTRAVENOUS; SUBCUTANEOUS EVERY 5 MIN PRN
Status: DISCONTINUED | OUTPATIENT
Start: 2025-01-06 | End: 2025-01-06 | Stop reason: HOSPADM

## 2025-01-06 RX ORDER — HYDROMORPHONE HYDROCHLORIDE 1 MG/ML
0.5 INJECTION, SOLUTION INTRAMUSCULAR; INTRAVENOUS; SUBCUTANEOUS EVERY 10 MIN PRN
Status: DISCONTINUED | OUTPATIENT
Start: 2025-01-06 | End: 2025-01-06 | Stop reason: HOSPADM

## 2025-01-06 RX ORDER — METOCLOPRAMIDE HYDROCHLORIDE 5 MG/ML
10 INJECTION INTRAMUSCULAR; INTRAVENOUS
Status: DISCONTINUED | OUTPATIENT
Start: 2025-01-06 | End: 2025-01-06 | Stop reason: HOSPADM

## 2025-01-06 RX ORDER — DIPHENHYDRAMINE HYDROCHLORIDE 50 MG/ML
12.5 INJECTION INTRAMUSCULAR; INTRAVENOUS
Status: DISCONTINUED | OUTPATIENT
Start: 2025-01-06 | End: 2025-01-06 | Stop reason: HOSPADM

## 2025-01-06 RX ORDER — SODIUM CHLORIDE 450 MG/100ML
INJECTION, SOLUTION INTRAVENOUS CONTINUOUS
Status: DISCONTINUED | OUTPATIENT
Start: 2025-01-06 | End: 2025-01-07

## 2025-01-06 RX ORDER — PHENYLEPHRINE HYDROCHLORIDE 10 MG/ML
INJECTION INTRAVENOUS
Status: DISCONTINUED | OUTPATIENT
Start: 2025-01-06 | End: 2025-01-06 | Stop reason: SDUPTHER

## 2025-01-06 RX ORDER — OXYCODONE HYDROCHLORIDE 5 MG/1
10 TABLET ORAL EVERY 4 HOURS PRN
Status: DISCONTINUED | OUTPATIENT
Start: 2025-01-06 | End: 2025-01-06 | Stop reason: SDUPTHER

## 2025-01-06 RX ORDER — ASPIRIN 325 MG
325 TABLET, DELAYED RELEASE (ENTERIC COATED) ORAL 2 TIMES DAILY
Status: DISCONTINUED | OUTPATIENT
Start: 2025-01-06 | End: 2025-01-08 | Stop reason: HOSPADM

## 2025-01-06 RX ORDER — SODIUM CHLORIDE 0.9 % (FLUSH) 0.9 %
5-40 SYRINGE (ML) INJECTION EVERY 12 HOURS SCHEDULED
Status: DISCONTINUED | OUTPATIENT
Start: 2025-01-06 | End: 2025-01-08 | Stop reason: HOSPADM

## 2025-01-06 RX ORDER — SODIUM CHLORIDE 0.9 % (FLUSH) 0.9 %
5-40 SYRINGE (ML) INJECTION EVERY 12 HOURS SCHEDULED
Status: DISCONTINUED | OUTPATIENT
Start: 2025-01-06 | End: 2025-01-06 | Stop reason: HOSPADM

## 2025-01-06 RX ORDER — CEFAZOLIN SODIUM 1 G/3ML
INJECTION, POWDER, FOR SOLUTION INTRAMUSCULAR; INTRAVENOUS
Status: DISCONTINUED | OUTPATIENT
Start: 2025-01-06 | End: 2025-01-06 | Stop reason: SDUPTHER

## 2025-01-06 RX ORDER — SODIUM CHLORIDE 0.9 % (FLUSH) 0.9 %
5-40 SYRINGE (ML) INJECTION PRN
Status: DISCONTINUED | OUTPATIENT
Start: 2025-01-06 | End: 2025-01-08 | Stop reason: HOSPADM

## 2025-01-06 RX ORDER — SUCCINYLCHOLINE/SOD CL,ISO/PF 200MG/10ML
SYRINGE (ML) INTRAVENOUS
Status: DISCONTINUED | OUTPATIENT
Start: 2025-01-06 | End: 2025-01-06 | Stop reason: SDUPTHER

## 2025-01-06 RX ORDER — SODIUM CHLORIDE 9 MG/ML
INJECTION, SOLUTION INTRAVENOUS PRN
Status: DISCONTINUED | OUTPATIENT
Start: 2025-01-06 | End: 2025-01-06 | Stop reason: HOSPADM

## 2025-01-06 RX ORDER — OXYCODONE HYDROCHLORIDE 5 MG/1
5 TABLET ORAL EVERY 4 HOURS PRN
Status: DISCONTINUED | OUTPATIENT
Start: 2025-01-06 | End: 2025-01-08 | Stop reason: HOSPADM

## 2025-01-06 RX ORDER — SODIUM CHLORIDE 9 MG/ML
INJECTION, SOLUTION INTRAVENOUS PRN
Status: DISCONTINUED | OUTPATIENT
Start: 2025-01-06 | End: 2025-01-08 | Stop reason: HOSPADM

## 2025-01-06 RX ORDER — LIDOCAINE HYDROCHLORIDE 20 MG/ML
INJECTION, SOLUTION INTRAVENOUS
Status: DISCONTINUED | OUTPATIENT
Start: 2025-01-06 | End: 2025-01-06 | Stop reason: SDUPTHER

## 2025-01-06 RX ORDER — ASPIRIN 325 MG
325 TABLET, DELAYED RELEASE (ENTERIC COATED) ORAL 2 TIMES DAILY
Status: DISCONTINUED | OUTPATIENT
Start: 2025-01-06 | End: 2025-01-06 | Stop reason: SDUPTHER

## 2025-01-06 RX ORDER — SODIUM CHLORIDE 9 MG/ML
INJECTION, SOLUTION INTRAVENOUS PRN
Status: DISCONTINUED | OUTPATIENT
Start: 2025-01-06 | End: 2025-01-06 | Stop reason: SDUPTHER

## 2025-01-06 RX ORDER — FENTANYL CITRATE 50 UG/ML
INJECTION, SOLUTION INTRAMUSCULAR; INTRAVENOUS
Status: DISCONTINUED | OUTPATIENT
Start: 2025-01-06 | End: 2025-01-06 | Stop reason: SDUPTHER

## 2025-01-06 RX ORDER — ONDANSETRON 2 MG/ML
INJECTION INTRAMUSCULAR; INTRAVENOUS
Status: DISCONTINUED | OUTPATIENT
Start: 2025-01-06 | End: 2025-01-06 | Stop reason: SDUPTHER

## 2025-01-06 RX ORDER — HYDRALAZINE HYDROCHLORIDE 20 MG/ML
10 INJECTION INTRAMUSCULAR; INTRAVENOUS
Status: DISCONTINUED | OUTPATIENT
Start: 2025-01-06 | End: 2025-01-06 | Stop reason: HOSPADM

## 2025-01-06 RX ORDER — SODIUM CHLORIDE 0.9 % (FLUSH) 0.9 %
5-40 SYRINGE (ML) INJECTION PRN
Status: DISCONTINUED | OUTPATIENT
Start: 2025-01-06 | End: 2025-01-06 | Stop reason: HOSPADM

## 2025-01-06 RX ORDER — NALOXONE HYDROCHLORIDE 0.4 MG/ML
INJECTION, SOLUTION INTRAMUSCULAR; INTRAVENOUS; SUBCUTANEOUS PRN
Status: DISCONTINUED | OUTPATIENT
Start: 2025-01-06 | End: 2025-01-06 | Stop reason: HOSPADM

## 2025-01-06 RX ORDER — METHOCARBAMOL 100 MG/ML
INJECTION, SOLUTION INTRAMUSCULAR; INTRAVENOUS
Status: DISCONTINUED | OUTPATIENT
Start: 2025-01-06 | End: 2025-01-06 | Stop reason: SDUPTHER

## 2025-01-06 RX ORDER — LABETALOL HYDROCHLORIDE 5 MG/ML
10 INJECTION, SOLUTION INTRAVENOUS
Status: DISCONTINUED | OUTPATIENT
Start: 2025-01-06 | End: 2025-01-06 | Stop reason: HOSPADM

## 2025-01-06 RX ORDER — OXYCODONE HYDROCHLORIDE 5 MG/1
5 TABLET ORAL EVERY 4 HOURS PRN
Status: DISCONTINUED | OUTPATIENT
Start: 2025-01-06 | End: 2025-01-06 | Stop reason: SDUPTHER

## 2025-01-06 RX ORDER — PROPOFOL 10 MG/ML
INJECTION, EMULSION INTRAVENOUS
Status: DISCONTINUED | OUTPATIENT
Start: 2025-01-06 | End: 2025-01-06 | Stop reason: SDUPTHER

## 2025-01-06 RX ORDER — MORPHINE SULFATE 2 MG/ML
2 INJECTION, SOLUTION INTRAMUSCULAR; INTRAVENOUS
Status: DISCONTINUED | OUTPATIENT
Start: 2025-01-06 | End: 2025-01-06 | Stop reason: SDUPTHER

## 2025-01-06 RX ORDER — OXYCODONE HYDROCHLORIDE 5 MG/1
10 TABLET ORAL EVERY 4 HOURS PRN
Status: DISCONTINUED | OUTPATIENT
Start: 2025-01-06 | End: 2025-01-08 | Stop reason: HOSPADM

## 2025-01-06 RX ORDER — MORPHINE SULFATE 2 MG/ML
2 INJECTION, SOLUTION INTRAMUSCULAR; INTRAVENOUS
Status: DISCONTINUED | OUTPATIENT
Start: 2025-01-06 | End: 2025-01-08 | Stop reason: HOSPADM

## 2025-01-06 RX ORDER — DEXAMETHASONE SODIUM PHOSPHATE 4 MG/ML
INJECTION, SOLUTION INTRA-ARTICULAR; INTRALESIONAL; INTRAMUSCULAR; INTRAVENOUS; SOFT TISSUE
Status: DISCONTINUED | OUTPATIENT
Start: 2025-01-06 | End: 2025-01-06 | Stop reason: SDUPTHER

## 2025-01-06 RX ORDER — VANCOMYCIN 1.5 G/300ML
1500 INJECTION, SOLUTION INTRAVENOUS ONCE
Status: COMPLETED | OUTPATIENT
Start: 2025-01-06 | End: 2025-01-06

## 2025-01-06 RX ORDER — ROCURONIUM BROMIDE 10 MG/ML
INJECTION, SOLUTION INTRAVENOUS
Status: DISCONTINUED | OUTPATIENT
Start: 2025-01-06 | End: 2025-01-06 | Stop reason: SDUPTHER

## 2025-01-06 RX ORDER — SODIUM CHLORIDE 450 MG/100ML
INJECTION, SOLUTION INTRAVENOUS CONTINUOUS
Status: DISCONTINUED | OUTPATIENT
Start: 2025-01-06 | End: 2025-01-06

## 2025-01-06 RX ORDER — MAGNESIUM HYDROXIDE 1200 MG/15ML
LIQUID ORAL CONTINUOUS PRN
Status: DISCONTINUED | OUTPATIENT
Start: 2025-01-06 | End: 2025-01-06 | Stop reason: HOSPADM

## 2025-01-06 RX ADMIN — SUGAMMADEX 200 MG: 100 INJECTION, SOLUTION INTRAVENOUS at 17:25

## 2025-01-06 RX ADMIN — ROCURONIUM BROMIDE 50 MG: 10 INJECTION, SOLUTION INTRAVENOUS at 15:27

## 2025-01-06 RX ADMIN — DOXYCYCLINE 100 MG: 50 CAPSULE ORAL at 09:04

## 2025-01-06 RX ADMIN — METHOCARBAMOL 250 MG: 100 INJECTION, SOLUTION INTRAMUSCULAR; INTRAVENOUS at 16:33

## 2025-01-06 RX ADMIN — ATORVASTATIN CALCIUM 10 MG: 10 TABLET, FILM COATED ORAL at 20:49

## 2025-01-06 RX ADMIN — DOXYCYCLINE 100 MG: 50 CAPSULE ORAL at 20:49

## 2025-01-06 RX ADMIN — ONDANSETRON 4 MG: 2 INJECTION INTRAMUSCULAR; INTRAVENOUS at 15:29

## 2025-01-06 RX ADMIN — LIDOCAINE HYDROCHLORIDE 100 MG: 20 INJECTION, SOLUTION INTRAVENOUS at 15:19

## 2025-01-06 RX ADMIN — CALCIUM 500 MG: 500 TABLET ORAL at 08:56

## 2025-01-06 RX ADMIN — PHENYLEPHRINE HYDROCHLORIDE 200 MCG: 10 INJECTION, SOLUTION INTRAVENOUS at 15:25

## 2025-01-06 RX ADMIN — DEXAMETHASONE SODIUM PHOSPHATE 4 MG: 4 INJECTION INTRA-ARTICULAR; INTRALESIONAL; INTRAMUSCULAR; INTRAVENOUS; SOFT TISSUE at 15:29

## 2025-01-06 RX ADMIN — Medication 140 MG: at 15:20

## 2025-01-06 RX ADMIN — PHENYLEPHRINE HYDROCHLORIDE 100 MCG: 10 INJECTION, SOLUTION INTRAVENOUS at 16:07

## 2025-01-06 RX ADMIN — FENTANYL CITRATE 25 MCG: 50 INJECTION, SOLUTION INTRAMUSCULAR; INTRAVENOUS at 16:31

## 2025-01-06 RX ADMIN — ENOXAPARIN SODIUM 40 MG: 100 INJECTION SUBCUTANEOUS at 20:49

## 2025-01-06 RX ADMIN — METOPROLOL SUCCINATE 100 MG: 100 TABLET, EXTENDED RELEASE ORAL at 08:56

## 2025-01-06 RX ADMIN — SODIUM CHLORIDE: 4.5 INJECTION, SOLUTION INTRAVENOUS at 20:52

## 2025-01-06 RX ADMIN — SODIUM CHLORIDE, SODIUM LACTATE, POTASSIUM CHLORIDE, AND CALCIUM CHLORIDE: .6; .31; .03; .02 INJECTION, SOLUTION INTRAVENOUS at 15:16

## 2025-01-06 RX ADMIN — VANCOMYCIN 1500 MG: 1.5 INJECTION, SOLUTION INTRAVENOUS at 17:46

## 2025-01-06 RX ADMIN — FENTANYL CITRATE 50 MCG: 50 INJECTION, SOLUTION INTRAMUSCULAR; INTRAVENOUS at 15:47

## 2025-01-06 RX ADMIN — FENTANYL CITRATE 25 MCG: 50 INJECTION, SOLUTION INTRAMUSCULAR; INTRAVENOUS at 15:19

## 2025-01-06 RX ADMIN — SODIUM CHLORIDE, SODIUM LACTATE, POTASSIUM CHLORIDE, AND CALCIUM CHLORIDE: .6; .31; .03; .02 INJECTION, SOLUTION INTRAVENOUS at 17:29

## 2025-01-06 RX ADMIN — ASPIRIN 325 MG: 325 TABLET, COATED ORAL at 20:49

## 2025-01-06 RX ADMIN — CEFAZOLIN SODIUM 3 G: 1 POWDER, FOR SOLUTION INTRAMUSCULAR; INTRAVENOUS at 15:30

## 2025-01-06 RX ADMIN — INSULIN LISPRO 2 UNITS: 100 INJECTION, SOLUTION INTRAVENOUS; SUBCUTANEOUS at 08:55

## 2025-01-06 RX ADMIN — SODIUM CHLORIDE, PRESERVATIVE FREE 10 ML: 5 INJECTION INTRAVENOUS at 08:56

## 2025-01-06 RX ADMIN — LEVOTHYROXINE SODIUM 112 MCG: 0.11 TABLET ORAL at 05:22

## 2025-01-06 RX ADMIN — PROPOFOL 120 MG: 10 INJECTION, EMULSION INTRAVENOUS at 15:19

## 2025-01-06 RX ADMIN — INSULIN GLARGINE 35 UNITS: 100 INJECTION, SOLUTION SUBCUTANEOUS at 08:55

## 2025-01-06 RX ADMIN — SENNOSIDES AND DOCUSATE SODIUM 2 TABLET: 50; 8.6 TABLET ORAL at 08:56

## 2025-01-06 RX ADMIN — PHENYLEPHRINE HYDROCHLORIDE 200 MCG: 10 INJECTION, SOLUTION INTRAVENOUS at 15:46

## 2025-01-06 ASSESSMENT — PAIN SCALES - GENERAL: PAINLEVEL_OUTOF10: 2

## 2025-01-06 NOTE — PLAN OF CARE
Problem: Chronic Conditions and Co-morbidities  Goal: Patient's chronic conditions and co-morbidity symptoms are monitored and maintained or improved  1/6/2025 0009 by Margi Apple RN  Outcome: Progressing  RT provided CPAP for sleep.        Problem: Safety - Adult  Goal: Free from fall injury  1/6/2025 0009 by Margi Apple RN  Outcome: Progressing  Note: Standard safety measures in place.   1/5/2025 1741 by Clarissa Meraz RN  Outcome: Progressing     Problem: Pain  Goal: Verbalizes/displays adequate comfort level or baseline comfort level  Outcome: Progressing  Flowsheets (Taken 1/6/2025 0009)  Verbalizes/displays adequate comfort level or baseline comfort level:   Encourage patient to monitor pain and request assistance   Assess pain using appropriate pain scale   Administer analgesics based on type and severity of pain and evaluate response  Note: Pain well managed per MAR.

## 2025-01-06 NOTE — PLAN OF CARE
Problem: Chronic Conditions and Co-morbidities  Goal: Patient's chronic conditions and co-morbidity symptoms are monitored and maintained or improved  1/6/2025 1021 by Abdulaziz Vergara, RN  Outcome: Progressing  1/6/2025 0009 by Margi Apple, RN  Outcome: Progressing

## 2025-01-06 NOTE — BRIEF OP NOTE
Brief Postoperative Note      Patient: Ty Lino  YOB: 1945  MRN: [id number]    Date of Procedure: 1/6/2025    Pre-Op Diagnosis Codes:      * Closed fracture of distal end of right tibia, unspecified fracture morphology, initial encounter [S82.301A]    Post-Op Diagnosis: Same       Procedure(s):  OPEN REDUCTION RIGHT DISTAL TIBIA AND SHAFT FRACTURE RIGHT    Surgeon(s):  Christiano Conway MD    Assistant:  Surgical Assistant: Cathy Pereira    Anesthesia: General    Estimated Blood Loss (mL): Minimal    Complications: None    Specimens:   * No specimens in log *    Implants:  * No implants in log *      Drains:   [REMOVED] External Urinary Catheter (Removed)   Site Assessment Clean,dry & intact 01/06/25 1200   Placement Replaced 01/06/25 1200   Securement Method Securing device (Describe) 01/06/25 1200   Catheter Care Catheter/Wick replaced;Suction Canister/Tubing changed 01/06/25 1200   Perineal Care Yes 01/06/25 1200   Suction 40 mmgHg continuous 01/06/25 1200   Urine Color Kylee 01/06/25 1200   Urine Appearance Clear 01/06/25 1200   Output (mL) 200 mL 01/06/25 1200       Findings:  Infection Present At Time Of Surgery (PATOS) (choose all levels that have infection present):  No infection present  Other Findings: Same.    Electronically signed by Christiano Conway MD on 1/6/2025 at 5:31 PM

## 2025-01-06 NOTE — ANESTHESIA PRE PROCEDURE
Value Date/Time    WBC 8.8 07/13/2023 06:00 AM    RBC 3.69 07/13/2023 06:00 AM    HGB 11.2 07/13/2023 06:00 AM    HCT 34.0 07/13/2023 06:00 AM    MCV 92.0 07/13/2023 06:00 AM    RDW 14.9 07/13/2023 06:00 AM     07/13/2023 06:00 AM       CMP:   Lab Results   Component Value Date/Time     07/13/2023 06:00 AM    K 5.1 07/13/2023 06:00 AM    K 4.5 03/28/2023 04:45 AM     07/13/2023 06:00 AM    CO2 27 07/13/2023 06:00 AM    BUN 18 07/13/2023 06:00 AM    CREATININE 1.3 07/13/2023 06:00 AM    AGRATIO 0.9 04/04/2023 07:05 AM    LABGLOM 55 07/13/2023 06:00 AM    GLUCOSE 321 07/13/2023 06:00 AM    CALCIUM 8.9 07/13/2023 06:00 AM    BILITOT 0.5 05/02/2023 07:49 AM    ALKPHOS 149 05/02/2023 07:49 AM    AST 15 05/02/2023 07:49 AM    ALT 29 05/02/2023 07:49 AM       POC Tests:   Recent Labs     01/05/25  1950   POCGLU 213*       Coags:   Lab Results   Component Value Date/Time    PROTIME 16.7 03/26/2023 05:50 AM    INR 1.35 03/26/2023 05:50 AM       HCG (If Applicable): No results found for: \"PREGTESTUR\", \"PREGSERUM\", \"HCG\", \"HCGQUANT\"     ABGs: No results found for: \"PHART\", \"PO2ART\", \"EGA1JSC\", \"XWL8HJW\", \"BEART\", \"E6SINLXU\"     Type & Screen (If Applicable):  No results found for: \"LABABO\"    Drug/Infectious Status (If Applicable):  No results found for: \"HIV\", \"HEPCAB\"    COVID-19 Screening (If Applicable): No results found for: \"COVID19\"        Anesthesia Evaluation  Patient summary reviewed and Nursing notes reviewed   no history of anesthetic complications:   Airway: Mallampati: III  TM distance: >3 FB   Neck ROM: full  Mouth opening: < 3 FB   Dental:          Pulmonary:   (+)     sleep apnea: on CPAP,                                  Cardiovascular:  Exercise tolerance: poor (<4 METS)  (+) hypertension: no interval change, CAD: non-obstructive, CABG/stent: no interval change, hyperlipidemia                  Neuro/Psych:   Negative Neuro/Psych ROS              GI/Hepatic/Renal:   (+) GERD: well

## 2025-01-06 NOTE — ANESTHESIA POSTPROCEDURE EVALUATION
Department of Anesthesiology  Postprocedure Note    Patient: Ty Lino  MRN: [id number]  YOB: 1945  Date of evaluation: 1/6/2025    Procedure Summary       Date: 01/06/25 Room / Location: [place] [address] / [facility]    Anesthesia Start: 1516 Anesthesia Stop: 1741    Procedure: OPEN REDUCTION RIGHT DISTAL TIBIA AND SHAFT FRACTURE RIGHT (Right: Leg Lower) Diagnosis:       Closed fracture of distal end of right tibia, unspecified fracture morphology, initial encounter      (Closed fracture of distal end of right tibia, unspecified fracture morphology, initial encounter [S82.301A])    Surgeons: Christiano Conway MD Responsible Provider: Kaiden Willis MD    Anesthesia Type: general ASA Status: 2            Anesthesia Type: No value filed.    Chino Phase I: Chino Score: 10    Chino Phase II:      Anesthesia Post Evaluation    Patient location during evaluation: PACU  Patient participation: complete - patient participated  Level of consciousness: awake and alert  Pain score: 0  Airway patency: patent  Nausea & Vomiting: no nausea and no vomiting  Cardiovascular status: hemodynamically stable  Respiratory status: acceptable  Hydration status: euvolemic  Pain management: adequate    No notable events documented.

## 2025-01-07 LAB
GLUCOSE BLD-MCNC: 114 MG/DL (ref 70–99)
GLUCOSE BLD-MCNC: 245 MG/DL (ref 70–99)
GLUCOSE BLD-MCNC: 273 MG/DL (ref 70–99)
GLUCOSE BLD-MCNC: 288 MG/DL (ref 70–99)
HCT VFR BLD AUTO: 31.5 % (ref 40.5–52.5)
HGB BLD-MCNC: 10.3 G/DL (ref 13.5–17.5)
PERFORMED ON: ABNORMAL

## 2025-01-07 PROCEDURE — 6360000002 HC RX W HCPCS: Performed by: ORTHOPAEDIC SURGERY

## 2025-01-07 PROCEDURE — 6370000000 HC RX 637 (ALT 250 FOR IP): Performed by: ORTHOPAEDIC SURGERY

## 2025-01-07 PROCEDURE — 36415 COLL VENOUS BLD VENIPUNCTURE: CPT

## 2025-01-07 PROCEDURE — 97530 THERAPEUTIC ACTIVITIES: CPT

## 2025-01-07 PROCEDURE — 99024 POSTOP FOLLOW-UP VISIT: CPT | Performed by: PHYSICIAN ASSISTANT

## 2025-01-07 PROCEDURE — 1200000000 HC SEMI PRIVATE

## 2025-01-07 PROCEDURE — 85018 HEMOGLOBIN: CPT

## 2025-01-07 PROCEDURE — 99222 1ST HOSP IP/OBS MODERATE 55: CPT | Performed by: INTERNAL MEDICINE

## 2025-01-07 PROCEDURE — 2500000003 HC RX 250 WO HCPCS: Performed by: ORTHOPAEDIC SURGERY

## 2025-01-07 PROCEDURE — 97166 OT EVAL MOD COMPLEX 45 MIN: CPT

## 2025-01-07 PROCEDURE — 2580000003 HC RX 258: Performed by: ORTHOPAEDIC SURGERY

## 2025-01-07 PROCEDURE — 85014 HEMATOCRIT: CPT

## 2025-01-07 PROCEDURE — 97162 PT EVAL MOD COMPLEX 30 MIN: CPT

## 2025-01-07 PROCEDURE — 94660 CPAP INITIATION&MGMT: CPT

## 2025-01-07 RX ORDER — ENOXAPARIN SODIUM 100 MG/ML
40 INJECTION SUBCUTANEOUS NIGHTLY
Qty: 8 ML | Refills: 0
Start: 2025-01-07 | End: 2025-01-27

## 2025-01-07 RX ADMIN — INSULIN GLARGINE 35 UNITS: 100 INJECTION, SOLUTION SUBCUTANEOUS at 08:20

## 2025-01-07 RX ADMIN — INSULIN LISPRO 4 UNITS: 100 INJECTION, SOLUTION INTRAVENOUS; SUBCUTANEOUS at 17:02

## 2025-01-07 RX ADMIN — ASPIRIN 325 MG: 325 TABLET, COATED ORAL at 08:21

## 2025-01-07 RX ADMIN — CEFAZOLIN 3000 MG: 3 INJECTION, POWDER, FOR SOLUTION INTRAVENOUS at 00:26

## 2025-01-07 RX ADMIN — INSULIN LISPRO 4 UNITS: 100 INJECTION, SOLUTION INTRAVENOUS; SUBCUTANEOUS at 12:01

## 2025-01-07 RX ADMIN — CALCIUM 500 MG: 500 TABLET ORAL at 08:21

## 2025-01-07 RX ADMIN — SENNOSIDES AND DOCUSATE SODIUM 2 TABLET: 50; 8.6 TABLET ORAL at 08:21

## 2025-01-07 RX ADMIN — DOXYCYCLINE 100 MG: 50 CAPSULE ORAL at 20:19

## 2025-01-07 RX ADMIN — ATORVASTATIN CALCIUM 10 MG: 10 TABLET, FILM COATED ORAL at 20:19

## 2025-01-07 RX ADMIN — SODIUM CHLORIDE, PRESERVATIVE FREE 10 ML: 5 INJECTION INTRAVENOUS at 08:22

## 2025-01-07 RX ADMIN — LEVOTHYROXINE SODIUM 112 MCG: 0.11 TABLET ORAL at 08:22

## 2025-01-07 RX ADMIN — SODIUM CHLORIDE, PRESERVATIVE FREE 10 ML: 5 INJECTION INTRAVENOUS at 20:19

## 2025-01-07 RX ADMIN — ASPIRIN 325 MG: 325 TABLET, COATED ORAL at 20:19

## 2025-01-07 RX ADMIN — SODIUM CHLORIDE, PRESERVATIVE FREE 10 ML: 5 INJECTION INTRAVENOUS at 08:21

## 2025-01-07 RX ADMIN — ENOXAPARIN SODIUM 40 MG: 100 INJECTION SUBCUTANEOUS at 20:19

## 2025-01-07 RX ADMIN — CEFAZOLIN 3000 MG: 3 INJECTION, POWDER, FOR SOLUTION INTRAVENOUS at 06:10

## 2025-01-07 RX ADMIN — METOPROLOL SUCCINATE 100 MG: 100 TABLET, EXTENDED RELEASE ORAL at 08:21

## 2025-01-07 RX ADMIN — DOXYCYCLINE 100 MG: 50 CAPSULE ORAL at 08:21

## 2025-01-07 RX ADMIN — INSULIN LISPRO 4 UNITS: 100 INJECTION, SOLUTION INTRAVENOUS; SUBCUTANEOUS at 20:26

## 2025-01-07 NOTE — CARE COORDINATION
Case Management Assessment  Initial Evaluation    Date/Time of Evaluation: 1/7/2025 11:21 AM  Assessment Completed by: KJ STANLEY    If patient is discharged prior to next notation, then this note serves as note for discharge by case management.    Patient Name: Ty Lino                   YOB: 1945  Diagnosis: Closed fracture of left tibia and fibula, initial encounter [S82.202A, S82.402A]  Tibia/fibula fracture, right, closed, initial encounter [S82.201A, S82.401A]  S/p tibial fracture [Z87.81]                   Date / Time: 1/5/2025  4:10 PM    Patient Admission Status: Inpatient   Readmission Risk (Low < 19, Mod (19-27), High > 27): Readmission Risk Score: 9.8    Current PCP: Bhavna Coon MD  PCP verified by CM? Yes    Chart Reviewed: Yes      History Provided by: Patient  Patient Orientation: Alert and Oriented    Patient Cognition: Alert    Hospitalization in the last 30 days (Readmission):  No    If yes, Readmission Assessment in  Navigator will be completed.    Advance Directives:      Code Status: Full Code   Patient's Primary Decision Maker is: Legal Next of Kin      Discharge Planning:    Patient lives with: Spouse/Significant Other Type of Home: House  Primary Care Giver: Self  Patient Support Systems include: Spouse/Significant Other   Current Financial resources: Medicare  Current community resources: None  Current services prior to admission: Durable Medical Equipment, C-pap            Current DME: Cane, Walker            Type of Home Care services:  None    ADLS  Prior functional level: Independent in ADLs/IADLs  Current functional level: Assistance with the following:, Mobility, Shopping, Housework, Bathing, Dressing, Toileting, Cooking    PT AM-PAC: 6 /24  OT AM-PAC: 14 /24    Family can provide assistance at DC: No (not to this extent)  Would you like Case Management to discuss the discharge plan with any other family members/significant others, and if so, who?

## 2025-01-07 NOTE — DISCHARGE INSTRUCTIONS
Remain in splint.  Keep clean and dry.  NWB on the RLE   F/U with Dr Conway in 2 weeks. Call [facility] for appointment time and date for follow up at [phone].    We are discharging you with a new medication called enoxaparin (Lovenox). It helps prevent blood clot formation following surgery. You should continue taking this medication for 20 days following discharge from the hospital. After 20 days, you will not need this medication anymore.     Please follow up with your primary care physician within 1 week following discharge.     Please return to the hospital with any new or worsening symptoms.

## 2025-01-07 NOTE — PLAN OF CARE
Problem: Discharge Planning  Goal: Discharge to home or other facility with appropriate resources  1/6/2025 2216 by Enid Lawson RN  Outcome: Progressing  Flowsheets (Taken 1/6/2025 2216)  Discharge to home or other facility with appropriate resources:   Identify barriers to discharge with patient and caregiver   Arrange for needed discharge resources and transportation as appropriate   Identify discharge learning needs (meds, wound care, etc)  Note: Patient plans to discharge home with support of family.      Problem: Pain  Goal: Verbalizes/displays adequate comfort level or baseline comfort level  1/6/2025 2216 by Enid Lawson RN  Outcome: Progressing  Flowsheets (Taken 1/6/2025 0853 by Abdulaziz Vergara, RN)  Verbalizes/displays adequate comfort level or baseline comfort level:   Encourage patient to monitor pain and request assistance   Administer analgesics based on type and severity of pain and evaluate response   Assess pain using appropriate pain scale   Implement non-pharmacological measures as appropriate and evaluate response   Consider cultural and social influences on pain and pain management   Notify Licensed Independent Practitioner if interventions unsuccessful or patient reports new pain  Note: Pain managed per MAR.     Problem: Skin/Tissue Integrity  Goal: Absence of new skin breakdown  Description: 1.  Monitor for areas of redness and/or skin breakdown  2.  Assess vascular access sites hourly  3.  Every 4-6 hours minimum:  Change oxygen saturation probe site  4.  Every 4-6 hours:  If on nasal continuous positive airway pressure, respiratory therapy assess nares and determine need for appliance change or resting period.  1/6/2025 2216 by Enid Lawson, RN  Outcome: Progressing

## 2025-01-07 NOTE — CONSULTS
[facility] Orthopedic Surgery  Consult Note         This patient is seen in consultation at the request of Carson Olsen MD     Reason for Consult:  Right ankle and leg pain / distal tibia and shaft periprosthetic fracture.    CHIEF COMPLAINT:  Right ankle and leg pain / distal tibia and shaft periprosthetic fracture.    History Obtained From:  patient, electronic medical record    HISTORY OF PRESENT ILLNESS:    Mr. Lino 79 y.o.  male seen today at [facility] for evaluation of a right ankle and leg injury which occurred when he was walking down steps at home and his right knee gave out and fell. He had Revision TKA by Dr Barton in 2023 for infection and he is on chronic Doxycycline.  He was first seen and evaluated in [facility], where he was x-rayed, splinted and transferred to [facility] and I was consulted. He is complaining of medial & lateral ankle and leg pain and swelling 8/10. This is better with elevation and worse with bearing any wt. The pain is sharp and not radiating. No numbness or tingling sensation. Alleviating factors: rest. No other complaint.     Past Medical History:        Diagnosis Date    Acid reflux     CAD (coronary artery disease)     DM (diabetes mellitus), type 2 (HCC)     Hypertension     SANTI (obstructive sleep apnea)     Thyroid disease        Past Surgical History:        Procedure Laterality Date    CARDIAC CATHETERIZATION      had coronary stenting in 2008    REVISION TOTAL KNEE ARTHROPLASTY Right     REVISION TOTAL KNEE ARTHROPLASTY Right     had infected R knee-had spacer and subsequent revision in 2013    REVISION TOTAL KNEE ARTHROPLASTY Right 3/28/2023    RIGHT KNEE HARDWARE REMOVAL, PLACEMENT OF ANTIBIOTIC SPACER performed by Mark Barton MD at [facility] OR    REVISION TOTAL KNEE ARTHROPLASTY Right 7/12/2023    RIGHT REVISION TOTAL KNEE REPLACEMENT WITH MESH -JEROME performed by Mark Barton MD at [facility] OR       Medications prior to admission:   Prior to Admission medications  
  Component Value Date    WBC 8.8 2023    HGB 10.3 (L) 2025    HCT 31.5 (L) 2025    MCV 92.0 2023     2023     Lab Results   Component Value Date    CREATININE 1.3 2023    BUN 18 2023     (L) 2023    K 5.1 2023     2023    CO2 27 2023       Hepatic Function Panel:   Lab Results   Component Value Date/Time    ALKPHOS 149 2023 07:49 AM    ALT 29 2023 07:49 AM    AST 15 2023 07:49 AM    BILITOT 0.5 2023 07:49 AM    BILIDIR 1.7 2023 05:50 AM    IBILI 1.1 2023 05:50 AM       Micro:  None     Imagin/5/25 R knee x-ray  Knee replacement components remain intact with comminuted proximal fibular head and fibular metaphyseal fracture noted, joint effusion and soft tissue calcifications medially.     25 R tib / fib / ankle  Distal oblique fractures of the tibia and fibula well above the ankle with overlying diffuse soft tissue swelling as described above.         25  tib / fib  Comminuted multi part fracture of the mid to distal tibia and proximal and distal fibula.     25 R tib /fib  'ORIF fracture of the mid shaft right fibula. 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 -   R knee PJI - asp - WBC 14,672 - 94%, cult S aureus / MRSA  R TKA resection 3/28,  Rx Daptomycin 750 daily through 24     Reimplantation 23 - suppression w Doxycycline    R tibia comminuted / periprosthetic fracture  POD#1 ORIF (Arebi)    RECOMMENDATIONS:    Cont Doxycycline 100 mg po bid   Postop per Ortho - [facility]      Medical Decision Making:  The following items were considered in medical decision making:  Discussion of patient care with other providers  Reviewed clinical lab tests  Reviewed radiology tests  Reviewed other diagnostic tests/interventions  Independent review of radiologic images -

## 2025-01-07 NOTE — PLAN OF CARE
Problem: Chronic Conditions and Co-morbidities  Goal: Patient's chronic conditions and co-morbidity symptoms are monitored and maintained or improved  1/7/2025 1514 by Abdulaziz Vergara, RN  Outcome: Progressing  Flowsheets (Taken 1/7/2025 0800)  Care Plan - Patient's Chronic Conditions and Co-Morbidity Symptoms are Monitored and Maintained or Improved:   Monitor and assess patient's chronic conditions and comorbid symptoms for stability, deterioration, or improvement   Collaborate with multidisciplinary team to address chronic and comorbid conditions and prevent exacerbation or deterioration   Update acute care plan with appropriate goals if chronic or comorbid symptoms are exacerbated and prevent overall improvement and discharge  1/7/2025 1514 by Abdulaziz Vergara, RN  Outcome: Progressing  Flowsheets (Taken 1/7/2025 0800)  Care Plan - Patient's Chronic Conditions and Co-Morbidity Symptoms are Monitored and Maintained or Improved:   Monitor and assess patient's chronic conditions and comorbid symptoms for stability, deterioration, or improvement   Collaborate with multidisciplinary team to address chronic and comorbid conditions and prevent exacerbation or deterioration   Update acute care plan with appropriate goals if chronic or comorbid symptoms are exacerbated and prevent overall improvement and discharge

## 2025-01-08 VITALS
OXYGEN SATURATION: 98 % | HEIGHT: 65 IN | SYSTOLIC BLOOD PRESSURE: 116 MMHG | BODY MASS INDEX: 44.15 KG/M2 | DIASTOLIC BLOOD PRESSURE: 72 MMHG | WEIGHT: 265 LBS | TEMPERATURE: 97.6 F | HEART RATE: 80 BPM | RESPIRATION RATE: 16 BRPM

## 2025-01-08 LAB
ANION GAP SERPL CALCULATED.3IONS-SCNC: 5 MMOL/L (ref 3–16)
BASOPHILS # BLD: 0.1 K/UL (ref 0–0.2)
BASOPHILS NFR BLD: 0.6 %
BUN SERPL-MCNC: 25 MG/DL (ref 7–20)
CALCIUM SERPL-MCNC: 8.8 MG/DL (ref 8.3–10.6)
CHLORIDE SERPL-SCNC: 105 MMOL/L (ref 99–110)
CO2 SERPL-SCNC: 28 MMOL/L (ref 21–32)
CREAT SERPL-MCNC: 1.3 MG/DL (ref 0.8–1.3)
DEPRECATED RDW RBC AUTO: 15 % (ref 12.4–15.4)
EOSINOPHIL # BLD: 0.2 K/UL (ref 0–0.6)
EOSINOPHIL NFR BLD: 2.3 %
GFR SERPLBLD CREATININE-BSD FMLA CKD-EPI: 55 ML/MIN/{1.73_M2}
GLUCOSE BLD-MCNC: 207 MG/DL (ref 70–99)
GLUCOSE BLD-MCNC: 272 MG/DL (ref 70–99)
GLUCOSE SERPL-MCNC: 156 MG/DL (ref 70–99)
HCT VFR BLD AUTO: 35.3 % (ref 40.5–52.5)
HGB BLD-MCNC: 11.7 G/DL (ref 13.5–17.5)
LYMPHOCYTES # BLD: 1.6 K/UL (ref 1–5.1)
LYMPHOCYTES NFR BLD: 19.8 %
MCH RBC QN AUTO: 31.9 PG (ref 26–34)
MCHC RBC AUTO-ENTMCNC: 33.2 G/DL (ref 31–36)
MCV RBC AUTO: 95.9 FL (ref 80–100)
MONOCYTES # BLD: 1 K/UL (ref 0–1.3)
MONOCYTES NFR BLD: 12 %
NEUTROPHILS # BLD: 5.4 K/UL (ref 1.7–7.7)
NEUTROPHILS NFR BLD: 65.3 %
PERFORMED ON: ABNORMAL
PERFORMED ON: ABNORMAL
PLATELET # BLD AUTO: 146 K/UL (ref 135–450)
PMV BLD AUTO: 8.8 FL (ref 5–10.5)
POTASSIUM SERPL-SCNC: 4.5 MMOL/L (ref 3.5–5.1)
RBC # BLD AUTO: 3.68 M/UL (ref 4.2–5.9)
SODIUM SERPL-SCNC: 138 MMOL/L (ref 136–145)
WBC # BLD AUTO: 8.3 K/UL (ref 4–11)

## 2025-01-08 PROCEDURE — 2500000003 HC RX 250 WO HCPCS: Performed by: ORTHOPAEDIC SURGERY

## 2025-01-08 PROCEDURE — 99232 SBSQ HOSP IP/OBS MODERATE 35: CPT | Performed by: INTERNAL MEDICINE

## 2025-01-08 PROCEDURE — 97535 SELF CARE MNGMENT TRAINING: CPT

## 2025-01-08 PROCEDURE — 36415 COLL VENOUS BLD VENIPUNCTURE: CPT

## 2025-01-08 PROCEDURE — 80048 BASIC METABOLIC PNL TOTAL CA: CPT

## 2025-01-08 PROCEDURE — 85025 COMPLETE CBC W/AUTO DIFF WBC: CPT

## 2025-01-08 PROCEDURE — 6370000000 HC RX 637 (ALT 250 FOR IP): Performed by: ORTHOPAEDIC SURGERY

## 2025-01-08 PROCEDURE — 94660 CPAP INITIATION&MGMT: CPT

## 2025-01-08 PROCEDURE — 97530 THERAPEUTIC ACTIVITIES: CPT

## 2025-01-08 RX ADMIN — INSULIN GLARGINE 35 UNITS: 100 INJECTION, SOLUTION SUBCUTANEOUS at 08:51

## 2025-01-08 RX ADMIN — INSULIN LISPRO 4 UNITS: 100 INJECTION, SOLUTION INTRAVENOUS; SUBCUTANEOUS at 17:07

## 2025-01-08 RX ADMIN — LEVOTHYROXINE SODIUM 112 MCG: 0.11 TABLET ORAL at 06:37

## 2025-01-08 RX ADMIN — INSULIN LISPRO 2 UNITS: 100 INJECTION, SOLUTION INTRAVENOUS; SUBCUTANEOUS at 11:54

## 2025-01-08 RX ADMIN — SENNOSIDES AND DOCUSATE SODIUM 2 TABLET: 50; 8.6 TABLET ORAL at 08:51

## 2025-01-08 RX ADMIN — ACETAMINOPHEN 650 MG: 325 TABLET ORAL at 18:42

## 2025-01-08 RX ADMIN — ASPIRIN 325 MG: 325 TABLET, COATED ORAL at 11:53

## 2025-01-08 RX ADMIN — METOPROLOL SUCCINATE 100 MG: 100 TABLET, EXTENDED RELEASE ORAL at 08:50

## 2025-01-08 RX ADMIN — DOXYCYCLINE 100 MG: 50 CAPSULE ORAL at 08:50

## 2025-01-08 RX ADMIN — SODIUM CHLORIDE, PRESERVATIVE FREE 10 ML: 5 INJECTION INTRAVENOUS at 08:51

## 2025-01-08 RX ADMIN — CALCIUM 500 MG: 500 TABLET ORAL at 08:51

## 2025-01-08 NOTE — CARE COORDINATION
Case Management Assessment            Discharge Note                    Date / Time of Note: 1/8/2025 3:53 PM                  Discharge Note Completed by: Breana Perez MSW, LSW    Patient Name: Ty Lino   YOB: 1945  Diagnosis: Closed fracture of left tibia and fibula, initial encounter [S82.202A, S82.402A]  Tibia/fibula fracture, right, closed, initial encounter [S82.201A, S82.401A]  S/p tibial fracture [Z87.81]   Date / Time: 1/5/2025  4:10 PM    Current PCP: Bhavna Coon MD  Clinic patient: No    Hospitalization in the last 30 days: No       Advance Directives:  Code Status: Full Code  [place] DNR form completed and on chart: No    Financial:  Payor: HU[employer]NA MEDICARE / Plan: HU[employer] GOLD PLUS HMO / Product Type: *No Product type* /      Pharmacy:    [facility] - [facility] [address] -  [phone] - F [phone]  [address] [address]  Phone: [phone] Fax: [phone]    [facility] [place], [place]  [address] [facility] [address] - P [phone] - F [phone]  [address] [facility] [place] [address]  Phone: [phone] Fax: [phone]      Assistance purchasing medications?: Potential Assistance Purchasing Medications: No  Assistance provided by Case Management: None at this time    Does patient want to participate in local refill/ meds to beds program?:      Meds To Beds General Rules:  1. Can ONLY be done Monday- Friday between 8:30am-5pm  2. Prescription(s) must be in pharmacy by 3pm to be filled same day  3.Copy of patient's insurance/ prescription drug card and patient face sheet must be sent along with the prescription(s)  4. Cost of Rx cannot be added to hospital bill. If financial assistance is needed, please contact unit  or ;  or  CANNOT provide pharmacy voucher for patients co-pays  5. Patients can then  the prescription on

## 2025-01-08 NOTE — CARE COORDINATION
ADDENDUM:  3:44 PM    Pt's family arrived at bedside. Sea discussed dcp. All are in agreement. DC note to follow.     Electronically signed by PATEL Montano LSW, ACM-SW on 1/8/2025 at 3:44 PM    3:37 PM    SEA met w/pt at bedside, pt is in agreement with dcp. He stated his wife and son were going to come visit him tonight.     SEA LVM for pt's wife updating her on DC.     Electronically signed by PATEL Montano LSW, ACM-SW on 1/8/2025 at 3:37 PM    3:30 PM    Pre-cert is approved, sea working on transport.     Electronically signed by PATEL Montano LSW, ACM-SW on 1/8/2025 at 3:30 PM    1:35 PM    SEA spoke to admissions at [facility] and the [facility] is requesting updated clinicals. SEA faxed clinicals to [phone].    Electronically signed by PATEL Montano LSW, ACM-SW on 1/8/2025 at 1:35 PM    10:18 AM  Patient is from home with his spouse. No current services. He has a cane and rolling walker. He is an active . He has a PCP that he is active with (Dr. Bhavna Coon) and denies issues with obtaining medications.    Plan to dc to [facility]  SEA checked pre-cert, Auth ID  [id number], pre-cert still pending.     SW following.   Electronically signed by PATEL Montano LSW, ACM-SW on 1/8/2025 at 10:18 AM  [phone]

## 2025-01-08 NOTE — DISCHARGE INSTR - COC
Dressing/Treatment Ace wrap 25 0203   Closure Sutures;Staples 25 1725   Margins Approximated 25 0030   Drainage Amount None (dry) 25 0203   Odor None 25 0203   Number of days: 1        Elimination:  Continence:   Bowel: {YES / NO:}  Bladder: {YES / NO:}  Urinary Catheter: {Urinary Catheter:141646237}   Colostomy/Ileostomy/Ileal Conduit: {YES / NO:}       Date of Last BM: ***    Intake/Output Summary (Last 24 hours) at 2025 1339  Last data filed at 2025 0641  Gross per 24 hour   Intake 460 ml   Output 450 ml   Net 10 ml     I/O last 3 completed shifts:  In: 820 [P.O.:820]  Out: 1300 [Urine:1300]    Safety Concerns:     { MORGAN Safety Concerns:925592993}    Impairments/Disabilities:      { MORGAN Impairments/Disabilities:744668517}    Nutrition Therapy:  Current Nutrition Therapy:   { MORGAN Diet List:138388826}    Routes of Feeding: {[facility] Other Feedings:430127308}  Liquids: {Slp liquid thickness:02727}  Daily Fluid Restriction: {[facility] DME Yes amt example:975559276}  Last Modified Barium Swallow with Video (Video Swallowing Test): {Done Not Done Date:}    Treatments at the Time of Hospital Discharge:   Respiratory Treatments: ***  Oxygen Therapy:  {Therapy; copd oxygen:63071}  Ventilator:    { CC Vent List:884496694}    Rehab Therapies: {THERAPEUTIC INTERVENTION:4979234336}  Weight Bearing Status/Restrictions: {[facility] Weight Bearin}  Other Medical Equipment (for information only, NOT a DME order):  {EQUIPMENT:275565357}  Other Treatments: ***    Patient's personal belongings (please select all that are sent with patient):  {[facility] DME Belongings:720572296}    RN SIGNATURE:  {Esignature:418978651}    CASE MANAGEMENT/SOCIAL WORK SECTION    Inpatient Status Date: 25    Readmission Risk Assessment Score:  [facility] RISK OF UNPLANNED READMISSION 2.0             12.3 Total Score        Discharging to Facility/ Agency     [facility]:

## 2025-01-08 NOTE — PROGRESS NOTES
01/06/25 0920   Encounter Summary   Encounter Overview/Reason Initial Encounter   Service Provided For Patient   Referral/Consult From Nurse  (Clarissa Meraz, RN)   Last Encounter  01/06/25  (f/u 1/7.)   Complexity of Encounter Low   Begin Time 0915   End Time  0920   Total Time Calculated 5 min   Assessment/Intervention/Outcome   Assessment Calm   Intervention Active listening  (By phone and via RN)   Outcome Comfort;Expressed feelings, needs, and concerns;Expressed Gratitude     Spiritual Health History and Assessment/Progress Note  [facility]    Initial Encounter,  ,  ,      Name: Ty Lino MRN: [id number]    Age: 79 y.o.     Sex: male   Language: English   Buddhist: Unknown   Closed fracture of left tibia and fibula, initial encounter     Date: 1/6/2025            Total Time Calculated: (P) 5 min              Spiritual Assessment began in [facility] 5T ORTHO/NEURO        Referral/Consult From: (P) Nurse (Clarissa Meraz, RN)   Encounter Overview/Reason: Initial Encounter  Service Provided For: Patient    Cara, Belief, Meaning:   Patient identifies as spiritual and is connected with a cara tradition or spiritual practice  Family/Friends No family/friends present      Importance and Influence:  Patient has spiritual/personal beliefs that influence decisions regarding their health  Family/Friends No family/friends present    Community:  Patient is connected with a spiritual community  Family/Friends No family/friends present    Assessment and Plan of Care:     Patient Interventions include: Facilitated expression of thoughts and feelings  Family/Friends Interventions include: No family/friends present    Patient Plan of Care: Spiritual Care available upon further referral  Family/Friends Plan of Care: No family/friends present    Electronically signed by BRIGETTE Iraheta on 1/6/2025 at 9:25 AM   
  Department of Orthopedic Surgery  Physician Assistant   Progress Note    Subjective:       Systemic or Specific Complaints: Pain controlled.  Sitting up in chair today.    Objective:     Patient Vitals for the past 24 hrs:   BP Temp Temp src Pulse Resp SpO2   01/07/25 1207 115/60 98.1 °F (36.7 °C) Oral 68 16 95 %   01/07/25 0819 122/84 98.4 °F (36.9 °C) Oral 66 16 94 %   01/07/25 0337 124/64 98 °F (36.7 °C) Oral 68 16 93 %   01/06/25 2342 133/73 98.2 °F (36.8 °C) Oral 68 16 96 %   01/06/25 2304 -- -- -- -- -- 92 %   01/06/25 1927 (!) 110/47 97.5 °F (36.4 °C) Oral 68 18 97 %   01/06/25 1815 132/60 98 °F (36.7 °C) Oral 70 21 94 %   01/06/25 1800 125/61 -- -- 68 21 97 %   01/06/25 1750 (!) 143/76 -- -- 67 16 93 %   01/06/25 1745 (!) 144/67 -- -- 67 18 96 %   01/06/25 1740 (!) 142/70 -- -- -- (!) 8 --   01/06/25 1738 138/71 98.2 °F (36.8 °C) Oral -- -- --       General: alert, appears stated age, and cooperative   Wound: Splint intact.  No shadow drainage   Motion: Painful range of Motion in affected extremity   DVT Exam: No evidence of DVT seen on physical exam.     Additional exam: NVI to touch in the foot.  Moving toes.  Distal pulses intact.  Sensation intact.      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     Plan:      1:  PT/OT as able. NWB .  Appreicate ID input as they have followed him previously for the TKR infxn.    2:  Continue 
4 Eyes Skin Assessment     NAME:  Ty Lino  YOB: 1945  MEDICAL RECORD NUMBER:  [id number]    The patient is being assessed for  Transfer to New Unit    I agree that at least one RN has performed a thorough Head to Toe Skin Assessment on the patient. ALL assessment sites listed below have been assessed.      Areas assessed by both nurses:    Head, Face, Ears, Shoulders, Back, Chest, Arms, Elbows, Hands, Sacrum. Buttock, Coccyx, Ischium, Legs. Feet and Heels, and Under Medical Devices         Does the Patient have a Wound? Yes wound was present on arrival and LDA documented        Ankur Prevention initiated by RN: Yes  Wound Care Orders initiated by RN: No    Pressure Injury (Stage 3,4, Unstageable, DTI, NWPT, and Complex wounds) if present, place Wound referral order by RN under : No    New Ostomies, if present place, Ostomy referral order under : No     Nurse 1 eSignature: Electronically signed by Luz Marina Ly RN on 1/8/25 at 3:25 AM EST    **SHARE this note so that the co-signing nurse can place an eSignature**    Nurse 2 eSignature: Electronically signed by Amina Contreras RN on 1/8/25 at 2:04 AM EST    
4 Eyes Skin Assessment     NAME:  Ty Lino  YOB: 1945  MEDICAL RECORD NUMBER:  [id number]    The patient is being assessed for  Admission    I agree that at least one RN has performed a thorough Head to Toe Skin Assessment on the patient. ALL assessment sites listed below have been assessed.      Areas assessed by both nurses:    Head, Face, Ears, Shoulders, Back, Chest, Arms, Elbows, Hands, Sacrum. Buttock, Coccyx, Ischium, Legs. Feet and Heels, Under Medical Devices , and Other    Blanchable redness to buttocks and bilateral heels, scattered scabs on LLE.           Does the Patient have a Wound? No noted wound(s)       Ankur Prevention initiated by RN: No  Wound Care Orders initiated by RN: No    Pressure Injury (Stage 3,4, Unstageable, DTI, NWPT, and Complex wounds) if present, place Wound referral order by RN under : No    New Ostomies, if present place, Ostomy referral order under : No     Nurse 1 eSignature: Electronically signed by Clarissa Meraz RN on 1/5/25 at 5:29 PM EST    **SHARE this note so that the co-signing nurse can place an eSignature**    Nurse 2 eSignature: {Esignature:[id number]}  
A&Ox4. Pain being managed per MAR. NPO since midnight for surgery later today. CHG wipe down completed. Voids per external catheter. Pt has not ambulated for this RN. Consent completed and confirmed by charge RN, Concha, in chart. Standard safety measures in place.   
Called report to darrick at nursing facility ,  
ID Follow-up NOTE    CC:   R TKA infection, R periprosthetic fracture   Antibiotics: Doxycycline     Admit Date: 2025  Hospital Day: 4    Subjective:     Patient c/o R leg / surg site pain      Objective:     Patient Vitals for the past 8 hrs:   BP Temp Temp src Pulse Resp SpO2   25 0851 127/85 97.7 °F (36.5 °C) Oral 77 18 98 %     I/O last 3 completed shifts:  In: 820 [P.O.:820]  Out: 1300 [Urine:1300]  No intake/output data recorded.    EXAM:  GENERAL: No apparent distress.    HEENT: Membranes moist, no oral lesion  NECK:  Supple, no lymphadenopathy  LUNGS: Clear b/l, no rales, no dullness  CARDIAC: RRR, no murmur appreciated  ABD:  + BS, soft / NT  EXT:  R leg with dressing / immobilizer  NEURO: No focal neurologic findings  PSYCH: Orientation, sensorium, mood normal  LINES:  Peripheral iv       Data Review:  Lab Results   Component Value Date    WBC 8.3 2025    HGB 11.7 (L) 2025    HCT 35.3 (L) 2025    MCV 95.9 2025     2025     Lab Results   Component Value Date    CREATININE 1.3 2025    BUN 25 (H) 2025     2025    K 4.5 2025     2025    CO2 28 2025       Hepatic Function Panel:   Lab Results   Component Value Date/Time    ALKPHOS 149 2023 07:49 AM    ALT 29 2023 07:49 AM    AST 15 2023 07:49 AM    BILITOT 0.5 2023 07:49 AM    BILIDIR 1.7 2023 05:50 AM    IBILI 1.1 2023 05:50 AM       Micro:  None      Imagin/5/25 R knee x-ray  Knee replacement components remain intact with comminuted proximal fibular head and fibular metaphyseal fracture noted, joint effusion and soft tissue calcifications medially.      25 R tib / fib / ankle  Distal oblique fractures of the tibia and fibula well above the ankle with overlying diffuse soft tissue swelling as described above.           1/5/25  tib / fib  Comminuted multi part fracture of the mid to distal tibia and proximal and distal 
Occupational Therapy  Facility/Department: [address]  Occupational Therapy Treatment     Name: Ty Lino  : 1945  MRN: [id number]  Date of Service: 2025    Discharge Recommendations:  Subacute/Skilled Nursing Facility  OT Equipment Recommendations  Other: defer at this time       Past Medical History:  has a past medical history of Acid reflux, CAD (coronary artery disease), DM (diabetes mellitus), type 2 (HCC), Hypertension, SANTI (obstructive sleep apnea), and Thyroid disease.  Past Surgical History:  has a past surgical history that includes Cardiac catheterization; Revision total knee arthroplasty (Right); Revision total knee arthroplasty (Right); Revision total knee arthroplasty (Right, 3/28/2023); Revision total knee arthroplasty (Right, 2023); and Tibia fracture surgery (Right, 2025).           Assessment  Performance deficits / Impairments: Decreased ADL status;Decreased balance;Decreased strength;Decreased safe awareness;Decreased functional mobility ;Decreased endurance  Assessment: Pt demonstrated improved bed mobility today and completed supine to sit and sit to supine w/ min A. Pt tolerated EOB for 15 mins w/ SBA to complete grooming tasks however pt was unable to attempt scooting and could not lift buttocks enough. Pt is motivated and eager to return to PLOF. Pt greatly limited by NWB at this time. Pt continues to benefit from OT to maximize functional independence. Cont OT per POC  REQUIRES OT FOLLOW-UP: Yes  Activity Tolerance  Activity Tolerance: Patient Tolerated treatment well     Plan  Occupational Therapy Plan  Times Per Week: 2-5  Times Per Day: Once a day  Current Treatment Recommendations: Strengthening, Balance training, Safety education & training, Functional mobility training, Patient/Caregiver education & training, Endurance training, Equipment evaluation, education, & procurement, Self-Care / 
Occupational Therapy  Facility/Department: [facility] 5T ORTHO/NEURO  Occupational Therapy Initial Assessment and treatment     Name: Ty Lino  : 1945  MRN: [id number]  Date of Service: 2025    Discharge Recommendations:  Subacute/Skilled Nursing Facility  OT Equipment Recommendations  Other: defer at this time       Past Medical History:  has a past medical history of Acid reflux, CAD (coronary artery disease), DM (diabetes mellitus), type 2 (HCC), Hypertension, SANTI (obstructive sleep apnea), and Thyroid disease.  Past Surgical History:  has a past surgical history that includes Cardiac catheterization; Revision total knee arthroplasty (Right); Revision total knee arthroplasty (Right); Revision total knee arthroplasty (Right, 3/28/2023); Revision total knee arthroplasty (Right, 2023); and Tibia fracture surgery (Right, 2025).       Assessment  Performance deficits / Impairments: Decreased ADL status;Decreased balance;Decreased strength;Decreased safe awareness;Decreased functional mobility ;Decreased endurance  Assessment: Pt resting in bed and agreeable to therapy. Education spent on pt NWB to RLE, pt receptive. Today, pt required max Ax1 to sit EOB and maxA2>total Ax2 to safely complete sit to supine transfer. Attempted 1 STS from EOB with RW requiring max AX2--pt was unable to clear buttocks off EOB. Limited session EOB due to pt sliding requiring quick return to supine. Overall, pt is not at his baseline function. He is limited due to decreased strength, decreased endurance, decreased ind with ADLs, transfers/mobility, and decreased balance. He will continue to benefit from inpt OT and at DC in order to progress pt in the above limitations to maximize his functional independence.  Prognosis: Fair  Decision Making: Medium Complexity  REQUIRES OT FOLLOW-UP: Yes  Activity Tolerance  Activity Tolerance: Patient Tolerated treatment well     Plan  Occupational Therapy Plan  Times Per Week: 
PACU Transfer Note    Vitals:    01/06/25 1815   BP: 132/60   Pulse: 70   Resp: 21   Temp: 98 °F (36.7 °C)   SpO2: 94%     Patient denies nausea at this time.      In: 1220 [P.O.:120; I.V.:1100]  Out: 450 [Urine:400]    Pain assessment:  none  Pain Level: 0    Report given to Receiving unit Abdulaziz MALHOTRA at bedside. Oral temperature of 98.0 verified with RN. Patient transported to [facility] by this RN and ROSCOE Mir. Call light and phone within reach, denies needs at this time.    1/6/2025 6:51 PM      
Patient brought to PACU #5 from the OR. Patient placed on monitors. Patient awake and coughing. Report received from RN and CRNA. Denies pain at this time.  
Patient tolerating PO. Denies nausea. Patient will call wife when back in room to update her.  
Patient transferred to room 3305 from . Patient A&Ox4 upon arrival. VSS. Patient oriented to room, staff, and call system. Educated on fall protocol and hourly rounding. Assessment as documented. Bed locked in low position. Patient informed to utilize call light with any needs. Pt verbalized understanding. Call light within reach. Will continue to monitor.     
Patient transferred to room 3305. Report called down to ROSCOE Sheppard. Patient belongings taken down.   
Physical Therapy  Facility/Department: [facility] 5T ORTHO/NEURO  Physical Therapy Initial Assessment/discharge note    Name: Ty Lino  : 1945  MRN: [id number]  Date of Service: 2025    Discharge Recommendations:  Subacute/Skilled Nursing Facility   PT Equipment Recommendations  Equipment Needed: No (defer to next level of care)      Patient Diagnosis(es):   Past Medical History:  has a past medical history of Acid reflux, CAD (coronary artery disease), DM (diabetes mellitus), type 2 (HCC), Hypertension, SANTI (obstructive sleep apnea), and Thyroid disease.  Past Surgical History:  has a past surgical history that includes Cardiac catheterization; Revision total knee arthroplasty (Right); Revision total knee arthroplasty (Right); Revision total knee arthroplasty (Right, 3/28/2023); Revision total knee arthroplasty (Right, 2023); and Tibia fracture surgery (Right, 2025).    Assessment  Assessment: 80 yo admitted post fall with R tib/fib fracture and repair on 25. Pt is now NWB R and in R KI post op (RN to check with surgery about KI orders). Pt demo mobility well below his reported baseline of independent ambulator at home with RW on level surfaces. Pt unable to stand NWB R (partially due to body habitus) with KI on; unsafe for pivot transfer. Pt reports he has not been good with slide board in past rehab admits. Recommend SNF at discharge to  maximize his functional independence. Pt in agreement with SNF. Recommend nursing utilize maximover to assist pt OOB PRN and often while he remains in hospital. Discussed with RN.  Treatment Diagnosis: mobility impairment due to fall  Decision Making: Medium Complexity  Requires PT Follow-Up: Yes  Activity Tolerance  Activity Tolerance: Patient tolerated evaluation without incident;Patient limited by fatigue    Plan  Physical Therapy Plan  General Plan:  (2-5)  Current Treatment Recommendations: Functional mobility training, Transfer training  Safety 
Pt aox4 and able to make needs known. Blood glucose monitored and managed prior to meals. No reports of pain or SOB. RLE incision dressing CDI and immobilizer in place, pt tolerating well. X2 w/ maxi move to chair. External cath in place. VSS and no acute events. Call light and personal items kept w/in reach.  
Pt aox4,vssra. Pt has not been oobts, nwb to rle. Tolerating fluids w/o complications, voiding external catheter. Pain managed per MAR. Knee immobilizer in place ACE wrap CDI. Pt endorses full sensation to RLE. CAP refill less than 3 sec. Foot warn to touch. NAEO, POC cont's.  
Pt. A&O x4, VSS, RA. Admission documentation completed by patient. Pain managed via MAR. Tolerating PO. Voiding via male external catheter. All safety precautions in place, call light/items in reach.   
chloride flush, sodium chloride, potassium chloride **OR** potassium alternative oral replacement **OR** potassium chloride, magnesium sulfate, ondansetron **OR** ondansetron, polyethylene glycol, acetaminophen **OR** acetaminophen, HYDROmorphone **OR** HYDROmorphone, glucose, dextrose bolus **OR** dextrose bolus, glucagon (rDNA), dextrose     Labs:  Personally reviewed and interpreted for clinical significance.     No results for input(s): \"WBC\", \"HGB\", \"HCT\", \"PLT\" in the last 72 hours.  No results for input(s): \"NA\", \"K\", \"CL\", \"CO2\", \"BUN\", \"CREATININE\", \"CALCIUM\", \"MG\", \"PHOS\" in the last 72 hours.  No results for input(s): \"PROBNP\", \"TROPHS\" in the last 72 hours.  No results for input(s): \"LABA1C\" in the last 72 hours.  No results for input(s): \"AST\", \"ALT\", \"BILIDIR\", \"BILITOT\", \"ALKPHOS\" in the last 72 hours.  No results for input(s): \"INR\", \"LACTA\", \"TSH\" in the last 72 hours.    Urine Cultures: No results found for: \"LABURIN\"  Blood Cultures:   Lab Results   Component Value Date/Time    BC No Growth after 4 days of incubation. 03/29/2023 05:30 AM     Lab Results   Component Value Date/Time    BLOODCULT2 No Growth after 4 days of incubation. 03/29/2023 06:14 AM     Organism:   Lab Results   Component Value Date/Time    ORG Staph aureus MRSA 03/28/2023 10:53 AM         Carson Jessica MD    
were ordered  [] Collateral history obtained     [x] Independent Interpretation of tests (any 1)    [x] Telemetry (Rhythm Strip) personally reviewed and interpreted        [] Imaging personally reviewed and interpreted     [x] Discussion (any 1)  [x] Multi-Disciplinary Rounds with Case Management  [] Discussed management of the case with           Labs:  Personally reviewed on 1/7/2025 and interpreted for clinical significance as documented above.     Recent Labs     01/07/25  0505   HGB 10.3*   HCT 31.5*     No results for input(s): \"NA\", \"K\", \"CL\", \"CO2\", \"BUN\", \"CREATININE\", \"CALCIUM\", \"MG\", \"PHOS\" in the last 72 hours.  No results for input(s): \"PROBNP\", \"TROPHS\" in the last 72 hours.  No results for input(s): \"LABA1C\" in the last 72 hours.  No results for input(s): \"AST\", \"ALT\", \"BILIDIR\", \"BILITOT\", \"ALKPHOS\" in the last 72 hours.  No results for input(s): \"INR\", \"LACTA\", \"TSH\" in the last 72 hours.    Urine Cultures: No results found for: \"LABURIN\"  Blood Cultures:   Lab Results   Component Value Date/Time    BC No Growth after 4 days of incubation. 03/29/2023 05:30 AM     Lab Results   Component Value Date/Time    BLOODCULT2 No Growth after 4 days of incubation. 03/29/2023 06:14 AM     Organism:   Lab Results   Component Value Date/Time    ORG Staph aureus MRSA 03/28/2023 10:53 AM         He Casillas DO    
cortical offset and slight displacement of multiple fragments. Ankle mortise remains intact. Hindfoot is intact. There is diffuse soft tissue swelling  both medial and laterally. IMPRESSION: Distal oblique fractures of the tibia and fibula well above the ankle with overlying diffuse soft tissue swelling as described above. 2 VIEWS OF THE RIGHT KNEE HISTORY:  Knee pain, previous replacement, fracture PROCEDURE: AP, lateral views were obtained. FINDINGS: There is evidence of a total knee replacement with the femoral and tibial components intact with long intramedullary rods. There is acute comminuted proximal fibular fracture through the metaphysis and fibular head region without significant cortical displacement. There is some soft tissue calcification medially and patellofemoral chronic degenerative changes and joint effusion suspected. IMPRESSION: Knee replacement components remain intact with comminuted proximal fibular head and fibular metaphyseal fracture noted, joint effusion and soft tissue calcifications medially. Electronically signed by Britton Sotelo,     XR ANKLE RIGHT (MIN 3 VIEWS)    Result Date: 1/5/2025  Right TIBIA/FIBULA 2 VIEWS on 1/5/2025 HISTORY: Pain., Fall, fracture PROCEDURE: AP and lateral views were obtained, including the knee and ankle joints. FINDINGS: There is comminuted fracture of the mid to distal tibial diaphysis below the implant with slight angulation and cortical displacement both anteriorly and posteriorly of the tibia. Multiple fractures are identified within the fibula. There is proximal fibular fracture at the metaphysis. There is an oblique fracture of the distal fibula with slight cortical offset posteriorly. Total knee replacement components are intact     Comminuted multi part fracture of the mid to distal tibia and proximal and distal fibula. RIGHT ANKLE: AP and lateral oblique views of the right ankle were obtained. FINDINGS: There is an oblique fracture of the

## 2025-01-08 NOTE — DISCHARGE SUMMARY
\"PROBNP\" in the last 72 hours.  UA:No results found for: \"NITRU\", \"COLORU\", \"PHUR\", \"LABCAST\", \"WBCUA\", \"RBCUA\", \"MUCUS\", \"TRICHOMONAS\", \"YEAST\", \"BACTERIA\", \"CLARITYU\", \"SPECGRAV\", \"LEUKOCYTESUR\", \"UROBILINOGEN\", \"BILIRUBINUR\", \"BLOODU\", \"GLUCOSEU\", \"KETUA\", \"AMORPHOUS\"  Urine Cultures: No results found for: \"LABURIN\"  Blood Cultures:   Lab Results   Component Value Date/Time    BC No Growth after 4 days of incubation. 03/29/2023 05:30 AM     Lab Results   Component Value Date/Time    BLOODCULT2 No Growth after 4 days of incubation. 03/29/2023 06:14 AM     Organism:   Lab Results   Component Value Date/Time    ORG Staph aureus MRSA 03/28/2023 10:53 AM       Time Spent Discharging patient 36 minutes    Electronically signed by He Casillas DO on 1/8/2025 at 1:48 PM

## 2025-01-09 LAB
GLUCOSE BLD-MCNC: 135 MG/DL (ref 70–99)
PERFORMED ON: ABNORMAL

## 2025-01-13 ENCOUNTER — TELEPHONE (OUTPATIENT)
Dept: ORTHOPEDIC SURGERY | Age: 80
End: 2025-01-13

## 2025-01-13 NOTE — TELEPHONE ENCOUNTER
Patient was scheduled for 1/23/2025 @ 3:00pm  at the [facility], [address], [place] [address].

## 2025-01-13 NOTE — TELEPHONE ENCOUNTER
Appointment Request     Patient requesting earlier appointment: Yes  Appointment offered to patient: 01/28/25  Patient Contact Number: [phone]     NICHOLAS WITH [facility] IS REQ A SOONER POST OP APPT FOR THE PATIENT    PLEASE CALL BACK THE ABOVE  NUMBER

## 2025-01-23 ENCOUNTER — OFFICE VISIT (OUTPATIENT)
Dept: ORTHOPEDIC SURGERY | Age: 80
End: 2025-01-23

## 2025-01-23 VITALS — WEIGHT: 265 LBS | HEIGHT: 65 IN | BODY MASS INDEX: 44.15 KG/M2

## 2025-01-23 DIAGNOSIS — S82.251A CLOSED DISPLACED COMMINUTED FRACTURE OF SHAFT OF RIGHT TIBIA, INITIAL ENCOUNTER: Primary | ICD-10-CM

## 2025-01-24 ENCOUNTER — TELEPHONE (OUTPATIENT)
Dept: ORTHOPEDIC SURGERY | Age: 80
End: 2025-01-24

## 2025-01-24 NOTE — PROGRESS NOTES
DIAGNOSIS:  Right tibial shaft periprosthetic fracture, status post ORIF with plate.    DATE OF SURGERY:  1/6/2025 .    HISTORY OF PRESENT ILLNESS: Mr. Lino 79 y.o.  male  who came in today for 2 weeks postoperative visit.  The patient denies any significant pain in the right ankle or leg, 0/10. He has been in a splint and NWB.  No numbness or tingling sensation. No fever or Chills. He is in ECF. 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    PHYSICAL EXAMINATION:  The incision is healed well, proximal staples were removed except the distal incision.  No signs of any erythema or drainage.  He has no pain with the active or passive range of motion of the right knee or ankle, calf is soft and non tender.  He has intact sensation, distally, and  is neurovascularly intact. Mild tenderness over the fracture site.    IMAGING:  Two views left tib-fib, showed anatomic alignment of the tibial shaft fracture, plate in good position, no loosening, or hardware failure.      IMPRESSION:  2 weeks out from right tibial shaft periprosthetic fracture plate,  and doing very well.    PLAN:  I have told the patient to work on ROM, as well as strengthening exercises. He will continue NWB in a boot for 8-10 weeks. He can the rest of stapled removed at [facility] in one week.  The patient will come back for a follow up in 6 weeks.  At that time, we will take Two views right tib-fib.        Procedures    [facility] / Airselect Tall Walking Boot     Patient was prescribed a Breg Tall Silva Walking Boot.  The right ankle will require stabilization / immobilization from this semi-rigid / rigid orthosis to improve their function.  The orthosis will assist in protecting the affected area, provide functional support and facilitate healing.    Patient was instructed to progress ambulation  as non weight bearing in the device.  The plan of care is to progress the patient to full weight bearing status.

## 2025-01-24 NOTE — TELEPHONE ENCOUNTER
General Question     Subject: OFFICE VISIT NOTES   Patient and /or Facility Request: Ty Lino   Contact Number: [phone]     NICHOLAS FROM [facility] REQUESTING THAT THE PATIENT'S OFFICE VISIT NOTES FROM 1/23/25 ARE FAXED OVER [phone]    PLEASE ADVISE

## 2025-03-05 ENCOUNTER — TELEPHONE (OUTPATIENT)
Dept: ORTHOPEDIC SURGERY | Age: 80
End: 2025-03-05

## 2025-03-05 ENCOUNTER — OFFICE VISIT (OUTPATIENT)
Dept: ORTHOPEDIC SURGERY | Age: 80
End: 2025-03-05

## 2025-03-05 VITALS — WEIGHT: 265 LBS | HEIGHT: 65 IN | BODY MASS INDEX: 44.15 KG/M2

## 2025-03-05 DIAGNOSIS — S82.251A CLOSED DISPLACED COMMINUTED FRACTURE OF SHAFT OF RIGHT TIBIA, INITIAL ENCOUNTER: Primary | ICD-10-CM

## 2025-03-05 PROCEDURE — 99024 POSTOP FOLLOW-UP VISIT: CPT | Performed by: NURSE PRACTITIONER

## 2025-03-05 NOTE — TELEPHONE ENCOUNTER
ANETTEN faxed through [facility] to [phone]   
Fax OVN to [phone] [facility]     SW patient's wife, she stated the facility is only aware of the appointment because she called to inform them. She is also upset that she is not able to understand Dr. Conway's accent and the OVN were not sent to the facility. Will request That Dorita Wilburn see the patient today. Got a fax number for the facility to fax the OVN.     SW the facility, They stated the facility is aware of the apt and they have transport scheduled to bring him to his appointment today.           
General Question     Subject: APPOINTMENT  Patient and /or Facility Request: Ty Lino   Contact Number: [phone]     PATIENT'S WIFE CALLING VERY UPSET BECAUSE [facility] [place] WAS NOT AWARE THAT THE PATIENT HAD AN POST OP APPOINTMENT TODAY  AT 3:15.    WIFE STATED THAT THE FACILITY DON'T KNOW IF THEY WILL BE ABLE TO GET TRANSPORTATION FOR HIM TODAY.     WIFE WILL LIKE FOR SOMEONE TO CALL THE FACILITY [phone] AND ASK FOR THE NURSE OF THE DAY FOR THE PATIENT.      
Detail Level: Detailed
Detail Level: Generalized

## 2025-03-05 NOTE — PROGRESS NOTES
DIAGNOSIS:  Right tibial shaft periprosthetic fracture, status post ORIF with plate.    DATE OF SURGERY:  1/6/2025 .    HISTORY OF PRESENT ILLNESS: Mr. Lino 79 y.o.  male  who came in today for 8 weeks postoperative visit.  The patient denies any significant pain in the right ankle or leg, 0/10 and is doing well with the pain. He has been in a boot and NWB.  No numbness or tingling sensation. Denies diabetes.  No fever or Chills. He is in ECF and working with PT. 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    PHYSICAL EXAMINATION:  The incision is healed well. No signs of any erythema or drainage.  He has no pain with the active or passive range of motion of the right knee or ankle, calf is soft and non tender. Decreased ROM right knee (0-90). He has intact sensation, distally, and  is neurovascularly intact. Mild tenderness over the fracture site lateral malleolus.    IMAGING:  Two views left tib-fib, taken today in the office showed anatomic alignment of the tibial shaft fracture, plate in good position, no loosening, or hardware failure.  There is callus over the fracture sites lateral malleolus and tibial shaft. The knee revision hardware is intact. The vessels are calcified.     IMPRESSION:  8 weeks out from right tibial shaft periprosthetic fracture plate,  and doing very well.    PLAN:  I have told the patient to work on ROM, as well as strengthening exercises with PT.  He can start TTWB in a boot for the next 2 weeks, then WB in a boot for 2 weeks, then discontinue the boot, WBAT. Compression hose to help with the swelling. The patient will come back for a follow up in 6 weeks.  At that time, we will take Two views right tib-fib.          Dorita Wilburn, APRN - CNP

## 2025-04-16 ENCOUNTER — OFFICE VISIT (OUTPATIENT)
Dept: ORTHOPEDIC SURGERY | Age: 80
End: 2025-04-16
Payer: MEDICARE

## 2025-04-16 VITALS — BODY MASS INDEX: 44.15 KG/M2 | WEIGHT: 264.99 LBS | HEIGHT: 65 IN

## 2025-04-16 DIAGNOSIS — S82.251A CLOSED DISPLACED COMMINUTED FRACTURE OF SHAFT OF RIGHT TIBIA, INITIAL ENCOUNTER: Primary | ICD-10-CM

## 2025-04-16 PROCEDURE — 1123F ACP DISCUSS/DSCN MKR DOCD: CPT | Performed by: NURSE PRACTITIONER

## 2025-04-16 PROCEDURE — G8417 CALC BMI ABV UP PARAM F/U: HCPCS | Performed by: NURSE PRACTITIONER

## 2025-04-16 PROCEDURE — 99213 OFFICE O/P EST LOW 20 MIN: CPT | Performed by: NURSE PRACTITIONER

## 2025-04-16 PROCEDURE — 1159F MED LIST DOCD IN RCRD: CPT | Performed by: NURSE PRACTITIONER

## 2025-04-16 PROCEDURE — G8427 DOCREV CUR MEDS BY ELIG CLIN: HCPCS | Performed by: NURSE PRACTITIONER

## 2025-04-16 PROCEDURE — 1126F AMNT PAIN NOTED NONE PRSNT: CPT | Performed by: NURSE PRACTITIONER

## 2025-04-16 PROCEDURE — 1036F TOBACCO NON-USER: CPT | Performed by: NURSE PRACTITIONER

## 2025-04-16 NOTE — PROGRESS NOTES
on file   Other Topics Concern    Not on file   Social History Narrative    Not on file     Social Drivers of Health     Financial Resource Strain: Not on file   Food Insecurity: No Food Insecurity (1/5/2025)    Hunger Vital Sign     Worried About Running Out of Food in the Last Year: Never true     Ran Out of Food in the Last Year: Never true   Transportation Needs: No Transportation Needs (1/5/2025)    PRAPARE - Transportation     Lack of Transportation (Medical): No     Lack of Transportation (Non-Medical): No   Physical Activity: Not on file   Stress: Not on file   Social Connections: Not on file   Intimate Partner Violence: Not on file   Housing Stability: Low Risk  (1/5/2025)    Housing Stability Vital Sign     Unable to Pay for Housing in the Last Year: No     Number of Times Moved in the Last Year: 1     Homeless in the Last Year: No       No family history on file.    Current Outpatient Medications on File Prior to Visit   Medication Sig Dispense Refill    doxycycline hyclate (VIBRAMYCIN) 100 MG capsule Take 1 capsule by mouth 2 times daily      vitamin C (ASCORBIC ACID) 500 MG tablet Take 1 tablet by mouth daily      acetaminophen (TYLENOL) 500 MG tablet Take 2 tablets by mouth in the morning, at noon, and at bedtime for 14 days 84 tablet 0    atorvastatin (LIPITOR) 10 MG tablet Take 1 tablet by mouth daily At bedtime      glucagon 1 MG injection Inject 1 mg into the muscle once      calcium citrate (CALCITRATE) 950 (200 Ca) MG tablet Take 1 tablet by mouth daily      vitamin D 25 MCG (1000 UT) CAPS Take 1 capsule by mouth 2 times daily      clopidogrel (PLAVIX) 75 MG tablet       insulin glargine (LANTUS SOLOSTAR) 100 UNIT/ML injection pen   See Instructions, INJECT SUBCUTANEOUSLY 44  UNITS DAILY, # 45 mL, 1 Refill(s), Pharmacy: "[facility] Delivery ([facility] Mail [facility]), INJECT SUBCUTANEOUSLY 44  UNITS DAILY      insulin lispro (HUMALOG) 100 UNIT/ML injection vial Inject into the skin 3 times daily

## 2025-05-13 ENCOUNTER — TELEPHONE (OUTPATIENT)
Dept: ORTHOPEDIC SURGERY | Age: 80
End: 2025-05-13

## 2025-05-13 NOTE — TELEPHONE ENCOUNTER
SW patient's wife. She stated he was doing well with HHPT and walking with his walker. Denies any recent falls or injuries. Stated he is on water pills for the fluid he is retaining.      Patient scheduled 5/14/25 at [place] at 2:00 PM.

## 2025-05-13 NOTE — TELEPHONE ENCOUNTER
----- Message from Deborah SANCHES sent at 5/13/2025  2:57 PM EDT -----  Regarding: Specialty Message to Provider  Specialty Message to Provider    Relationship to Patient: Spouse/Partner WIFE MRS. CARRASCO      Patient Message: PATIENT CALLED IN ABOUT HIS RT LEG. HAVING A LOT OF PAIN IN IT. PATIENT CAN'T BEND IT. CAN'T PUT ANY WEIGHT ON THE KNEE.. ITS SWOLLEN.  SX WAS ON JAN 7.2025. WHAT CAN HE DO ABOUT HIS PAIN.. HE CURRENTLY TAKING WATER PILL.  REQ A CALL BACK.. PLEASE ADVISE   --------------------------------------------------------------------------------------------------------------------------    Call Back Information: OK to leave message on voicemail  Preferred Call Back Number: Phone [phone] CELL PHONE

## 2025-05-14 ENCOUNTER — OFFICE VISIT (OUTPATIENT)
Dept: ORTHOPEDIC SURGERY | Age: 80
End: 2025-05-14
Payer: MEDICARE

## 2025-05-14 VITALS — BODY MASS INDEX: 43.99 KG/M2 | HEIGHT: 65 IN | WEIGHT: 264 LBS

## 2025-05-14 DIAGNOSIS — S82.251A CLOSED DISPLACED COMMINUTED FRACTURE OF SHAFT OF RIGHT TIBIA, INITIAL ENCOUNTER: ICD-10-CM

## 2025-05-14 DIAGNOSIS — S82.251K CLOSED DISPLACED COMMINUTED FRACTURE OF SHAFT OF RIGHT TIBIA WITH NONUNION: ICD-10-CM

## 2025-05-14 DIAGNOSIS — S82.251A CLOSED DISPLACED COMMINUTED FRACTURE OF SHAFT OF RIGHT TIBIA, INITIAL ENCOUNTER: Primary | ICD-10-CM

## 2025-05-14 LAB — CRP SERPL-MCNC: 42.4 MG/L (ref 0–5.1)

## 2025-05-14 PROCEDURE — G8428 CUR MEDS NOT DOCUMENT: HCPCS | Performed by: ORTHOPAEDIC SURGERY

## 2025-05-14 PROCEDURE — 99214 OFFICE O/P EST MOD 30 MIN: CPT | Performed by: ORTHOPAEDIC SURGERY

## 2025-05-14 PROCEDURE — 1036F TOBACCO NON-USER: CPT | Performed by: ORTHOPAEDIC SURGERY

## 2025-05-14 PROCEDURE — G8417 CALC BMI ABV UP PARAM F/U: HCPCS | Performed by: ORTHOPAEDIC SURGERY

## 2025-05-14 PROCEDURE — 1123F ACP DISCUSS/DSCN MKR DOCD: CPT | Performed by: ORTHOPAEDIC SURGERY

## 2025-05-14 RX ORDER — MINERAL OIL/HYDROPHIL PETROLAT
OINTMENT (GRAM) TOPICAL
COMMUNITY
Start: 2025-04-01

## 2025-05-14 RX ORDER — HYDROCHLOROTHIAZIDE 12.5 MG/1
25 CAPSULE ORAL DAILY
COMMUNITY
Start: 2025-05-09

## 2025-05-14 RX ORDER — MULTIVITAMIN,THERAPEUTIC
1 TABLET ORAL DAILY
COMMUNITY

## 2025-05-14 RX ORDER — NYSTATIN 100000 [USP'U]/G
POWDER TOPICAL
COMMUNITY
Start: 2025-05-08 | End: 2025-05-18

## 2025-05-14 NOTE — PROGRESS NOTES
DIAGNOSIS:  Right tibial shaft periprosthetic fracture, status post ORIF with plate, nonunion.    DATE OF SURGERY:  1/6/2025 .    HISTORY OF PRESENT ILLNESS: Ty Lino 79 y.o.  male  who came in today for 4 months postoperative visit.  The patient reports worse pain in the right ankle or leg, 0/10 with rest, but can increase to 8/10 VAS with weight bearing. He has been WB using a walker and working with PT.  No numbness or tingling sensation. He is a diabetic.  No fever or Chills. He is in ECF and working with PT. 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    Past Medical History:   Diagnosis Date    Acid reflux     CAD (coronary artery disease)     DM (diabetes mellitus), type 2 (HCC)     Hypertension     SANTI (obstructive sleep apnea)     Thyroid disease        Past Surgical History:   Procedure Laterality Date    CARDIAC CATHETERIZATION      had coronary stenting in 2008    REVISION TOTAL KNEE ARTHROPLASTY Right     REVISION TOTAL KNEE ARTHROPLASTY Right     had infected R knee-had spacer and subsequent revision in 2013    REVISION TOTAL KNEE ARTHROPLASTY Right 3/28/2023    RIGHT KNEE HARDWARE REMOVAL, PLACEMENT OF ANTIBIOTIC SPACER performed by Mark Barton MD at [facility] OR    REVISION TOTAL KNEE ARTHROPLASTY Right 7/12/2023    RIGHT REVISION TOTAL KNEE REPLACEMENT WITH MESH -JEROME performed by Mark Barton MD at [facility] OR    TIBIA FRACTURE SURGERY Right 1/6/2025    OPEN REDUCTION RIGHT DISTAL TIBIA AND SHAFT FRACTURE RIGHT performed by Christiano Conway MD at [facility] OR       Social History     Socioeconomic History    Marital status:      Spouse name: Not on file    Number of children: Not on file    Years of education: Not on file    Highest education level: Not on file   Occupational History    Not on file   Tobacco Use    Smoking status: Never    Smokeless tobacco: Never   Substance and Sexual Activity    Alcohol use: Not on file    Drug use: Not on

## 2025-05-15 ENCOUNTER — TELEPHONE (OUTPATIENT)
Dept: ORTHOPEDIC SURGERY | Age: 80
End: 2025-05-15

## 2025-05-15 LAB
BASOPHILS # BLD: 0.1 K/UL (ref 0–0.2)
BASOPHILS NFR BLD: 1 %
DEPRECATED RDW RBC AUTO: 15.5 % (ref 12.4–15.4)
EOSINOPHIL # BLD: 0.2 K/UL (ref 0–0.6)
EOSINOPHIL NFR BLD: 2.3 %
ERYTHROCYTE [SEDIMENTATION RATE] IN BLOOD BY WESTERGREN METHOD: 61 MM/HR (ref 0–20)
HCT VFR BLD AUTO: 43.6 % (ref 40.5–52.5)
HGB BLD-MCNC: 14.8 G/DL (ref 13.5–17.5)
LYMPHOCYTES # BLD: 1.4 K/UL (ref 1–5.1)
LYMPHOCYTES NFR BLD: 17.2 %
MCH RBC QN AUTO: 31 PG (ref 26–34)
MCHC RBC AUTO-ENTMCNC: 33.8 G/DL (ref 31–36)
MCV RBC AUTO: 91.7 FL (ref 80–100)
MONOCYTES # BLD: 0.8 K/UL (ref 0–1.3)
MONOCYTES NFR BLD: 9.9 %
NEUTROPHILS # BLD: 5.7 K/UL (ref 1.7–7.7)
NEUTROPHILS NFR BLD: 69.6 %
PLATELET # BLD AUTO: 240 K/UL (ref 135–450)
PMV BLD AUTO: 9.3 FL (ref 5–10.5)
RBC # BLD AUTO: 4.76 M/UL (ref 4.2–5.9)
WBC # BLD AUTO: 8.2 K/UL (ref 4–11)

## 2025-05-15 NOTE — TELEPHONE ENCOUNTER
SW patient. Per Dr. Conway, the patient had some elevated levels. He should follow up with his infectious disease provider.     Patient agreed and understood.

## 2025-05-16 ENCOUNTER — TELEPHONE (OUTPATIENT)
Dept: ORTHOPEDIC SURGERY | Age: 80
End: 2025-05-16

## 2025-05-16 NOTE — TELEPHONE ENCOUNTER
JAYLA the patient's wife. She stated that his pain continues to increase and he is not able to put weight through his surgical leg. She is concerned there is infection. She stated she called Dr. Delgadillo's office this morning and they have not returned her phone call. She stated he almost feel thins morning.   She it planning on calling the squad to take him to the ED. I advised her to call Dr. Delgadillo's office to update him on the ED visit.     Dr. Conway has been informed.

## 2025-05-16 NOTE — TELEPHONE ENCOUNTER
Test Results     Type of Test: Labs   Date of Test: 5/14  Location of Test: [facility]   Patient Contact Number: [phone]     Wife would like a call back asap so that she knows whether or not to take him to the hospital for an infection.

## 2025-05-19 ENCOUNTER — TELEPHONE (OUTPATIENT)
Dept: ORTHOPEDIC SURGERY | Age: 80
End: 2025-05-19

## 2025-05-19 NOTE — TELEPHONE ENCOUNTER
General Question     Subject: INSURANCE MEMBER ID  Patient and /or Facility Request: Ty Lino   Contact Number: [phone]       PATIENT'S WIFE LUKE IS CALLING BACK REGARDING HER 'S MEMBER ID#.SEEN LAST WEEK. THEY WERE TOLD ID NUMBER DOES NOT MATCH WHAT SHE HAS ON HIS CARD. SHE WAS SPEAKING WITH SOMEONE AT THE  AND THEY WERE SUPPOSED TO CALL HER BACK, BUT DID NOT.    PLEASE RETURN CALL TO LUKE [phone]

## 2025-05-20 NOTE — TELEPHONE ENCOUNTER
I was not the  that talked to the pt on the phone on [phone], but I called her to solve and clear up the issue.    Then I called [employer] and verify the eligibility and member ID # [id number], pt called back and informed that his insurance verified though phone call.

## 2025-05-21 ENCOUNTER — TELEPHONE (OUTPATIENT)
Dept: ORTHOPEDIC SURGERY | Age: 80
End: 2025-05-21

## 2025-05-21 NOTE — TELEPHONE ENCOUNTER
----- Message from Natalia HUGO sent at 5/21/2025  2:48 PM EDT -----  Regarding: Specialty Message to Provider  Specialty Message to Provider    Relationship to Patient: Self     Patient Message: RT LEG BONE STIMULATOR  --------------------------------------------------------------------------------------------------------------------------    Call Back Information: OK to leave message on voicemail  Preferred Call Back Number: Phone  [phone]    WIFE CALLING, REGARDING HER  WAS IN TO SEE DR. QUINTEROS ON 5/14/25 AND AN ORDER WAS GOING TO BE SENT TO [facility] TO GET A BONE STIMULATOR.    [facility] HAS NOT RECEIVED A PRE AUTHORIZATION FOR THE  BONE STIMULATOR.    PLEASE CALL BACK WIFE AT THE ABOVE NUMBER.

## 2025-05-21 NOTE — TELEPHONE ENCOUNTER
SW patient's wife. She stated the insurance company said there has been no claim submitted. Advised her that Marilou has submitted the claim and it is pending with the insurance. Marilou is on vacation this week but she stated someone should follow up this week concerning the machine.

## 2025-05-22 ENCOUNTER — TELEPHONE (OUTPATIENT)
Dept: INFECTIOUS DISEASES | Age: 80
End: 2025-05-22

## 2025-05-22 NOTE — TELEPHONE ENCOUNTER
Spoke with patient and wife. Both report no needs from us from their standpoint.  Patient denies pain at present and denies fevers.  Advised of pharmacist note that  we would defer future steps to ortho.  If infection has recurred on PO doxy suppression, he would likely need surgical management and change in abx. Both v/u    Spoke with Alivia MALHOTRA at Dr. Coon's office and advised of pharmacist note. She v/u

## 2025-05-22 NOTE — TELEPHONE ENCOUNTER
Received call from Alivia at Dr. Coon's office.  Patient was seen in the ER on 5/16 for right leg cellulitis.  He was placed on Clindamycin 450mg q8hrs x 7 days.  He saw PCP on 5/20 and she extended the clinda x 3 days.    Patient is also on Doxy for hx of R TKA infection, R periprosthetic fracture      PCP wanted to reach out to ID to see if he should continue on Doxy with clindamycin

## 2025-05-22 NOTE — TELEPHONE ENCOUNTER
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  He had explant 3/28/23 and reimplant 7/12/23 with Dr. Barton.  Also had open reduction R distal tibia fracture 1/6/25 with Arebi.   MRSA isolate was also clinda sensitive.    I guess you could argue he can hold doxy while on clinda but I also don't think it matters much given such a short course and risk of forgetting to restart doxy outweighs being on both.     I do note an ortho telephone encounter from 5/16 stating that patient is in extreme pain on R and had called our ID office. But I do not see documentation that we received that call.     His inflammatory markers are elevated.     Please call wife and see if there are any needs from us. Although we would defer future steps to ortho.  If infection has recurred on PO doxy suppression, he would likely need surgical management and change in abx.       Dominique Hobbs, PharmD, BCPS  Clinical Pharmacy Specialist- [facility] Infectious Disease  Phone: [phone] (also available on [employer])  Fax: [phone]    For Pharmacy Admin Tracking Only    Program: Medical Group  CPA in place: Yes  Time Spent (min): 30

## 2025-06-06 ENCOUNTER — TELEPHONE (OUTPATIENT)
Dept: ORTHOPEDIC SURGERY | Age: 80
End: 2025-06-06

## 2025-06-06 NOTE — TELEPHONE ENCOUNTER
Per Dr. Luu note, patient is to be NWB and to use a walker to transfer to a chair or bed or bathroom.  Relayed the message to Kaylah, and she wanted me to make note that she has already taught the patient everything she could and he is doing well and using the bone stimulator. She said she would be seeing the patient one or two more times.

## 2025-06-06 NOTE — TELEPHONE ENCOUNTER
Talisha Maya is a physical Therapist from [facility] would like to know if is it ok for the pt to start walking just a little bit such as going to bathroom or so?    FYI, Pt is doing what has been told to do regarding doing bone stimulator.

## 2025-06-16 ENCOUNTER — OFFICE VISIT (OUTPATIENT)
Dept: ORTHOPEDIC SURGERY | Age: 80
End: 2025-06-16
Payer: MEDICARE

## 2025-06-16 DIAGNOSIS — S82.251A CLOSED DISPLACED COMMINUTED FRACTURE OF SHAFT OF RIGHT TIBIA, INITIAL ENCOUNTER: ICD-10-CM

## 2025-06-16 DIAGNOSIS — Z98.890 HISTORY OF RIGHT KNEE SURGERY: Primary | ICD-10-CM

## 2025-06-16 PROCEDURE — 1036F TOBACCO NON-USER: CPT | Performed by: ORTHOPAEDIC SURGERY

## 2025-06-16 PROCEDURE — G8417 CALC BMI ABV UP PARAM F/U: HCPCS | Performed by: ORTHOPAEDIC SURGERY

## 2025-06-16 PROCEDURE — 99213 OFFICE O/P EST LOW 20 MIN: CPT | Performed by: ORTHOPAEDIC SURGERY

## 2025-06-16 PROCEDURE — 1123F ACP DISCUSS/DSCN MKR DOCD: CPT | Performed by: ORTHOPAEDIC SURGERY

## 2025-06-16 PROCEDURE — G8428 CUR MEDS NOT DOCUMENT: HCPCS | Performed by: ORTHOPAEDIC SURGERY

## 2025-06-18 ENCOUNTER — OFFICE VISIT (OUTPATIENT)
Dept: ORTHOPEDIC SURGERY | Age: 80
End: 2025-06-18
Payer: MEDICARE

## 2025-06-18 VITALS — WEIGHT: 264 LBS | HEIGHT: 65 IN | BODY MASS INDEX: 43.99 KG/M2

## 2025-06-18 DIAGNOSIS — S82.251A CLOSED DISPLACED COMMINUTED FRACTURE OF SHAFT OF RIGHT TIBIA, INITIAL ENCOUNTER: Primary | ICD-10-CM

## 2025-06-18 PROCEDURE — G8417 CALC BMI ABV UP PARAM F/U: HCPCS | Performed by: ORTHOPAEDIC SURGERY

## 2025-06-18 PROCEDURE — 99214 OFFICE O/P EST MOD 30 MIN: CPT | Performed by: ORTHOPAEDIC SURGERY

## 2025-06-18 PROCEDURE — 1159F MED LIST DOCD IN RCRD: CPT | Performed by: ORTHOPAEDIC SURGERY

## 2025-06-18 PROCEDURE — G8427 DOCREV CUR MEDS BY ELIG CLIN: HCPCS | Performed by: ORTHOPAEDIC SURGERY

## 2025-06-18 PROCEDURE — 1036F TOBACCO NON-USER: CPT | Performed by: ORTHOPAEDIC SURGERY

## 2025-06-18 PROCEDURE — 1123F ACP DISCUSS/DSCN MKR DOCD: CPT | Performed by: ORTHOPAEDIC SURGERY

## 2025-06-18 PROCEDURE — 1126F AMNT PAIN NOTED NONE PRSNT: CPT | Performed by: ORTHOPAEDIC SURGERY

## 2025-06-18 NOTE — PROGRESS NOTES
DIAGNOSIS:  Right tibial shaft periprosthetic fracture, status post ORIF with plate, nonunion.    DATE OF SURGERY:  1/6/2025 .    HISTORY OF PRESENT ILLNESS: Ty Lino 80 y.o.  male  who came in today for 6 months postoperative visit.  The patient reports worse pain in the right ankle or leg, 0/10. He has been TTWB using a walker and working with PT.  No numbness or tingling sensation. He is a diabetic.  No fever or Chills. He is in ECF and working with PT. 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 He started Exogen bone stimulator on May 2025 to help in bone union.     Past Medical History:   Diagnosis Date    Acid reflux     CAD (coronary artery disease)     DM (diabetes mellitus), type 2 (HCC)     Hypertension     SANTI (obstructive sleep apnea)     Thyroid disease        Past Surgical History:   Procedure Laterality Date    CARDIAC CATHETERIZATION      had coronary stenting in 2008    REVISION TOTAL KNEE ARTHROPLASTY Right     REVISION TOTAL KNEE ARTHROPLASTY Right     had infected R knee-had spacer and subsequent revision in 2013    REVISION TOTAL KNEE ARTHROPLASTY Right 3/28/2023    RIGHT KNEE HARDWARE REMOVAL, PLACEMENT OF ANTIBIOTIC SPACER performed by Mark Barton MD at [facility] OR    REVISION TOTAL KNEE ARTHROPLASTY Right 7/12/2023    RIGHT REVISION TOTAL KNEE REPLACEMENT WITH MESH -JEROME performed by Mark Barton MD at [facility] OR    TIBIA FRACTURE SURGERY Right 1/6/2025    OPEN REDUCTION RIGHT DISTAL TIBIA AND SHAFT FRACTURE RIGHT performed by Christiano Conwya MD at [facility] OR       Social History     Socioeconomic History    Marital status:      Spouse name: Not on file    Number of children: Not on file    Years of education: Not on file    Highest education level: Not on file   Occupational History    Not on file   Tobacco Use    Smoking status: Never    Smokeless tobacco: Never   Substance and Sexual Activity    Alcohol use: Not on file    Drug

## 2025-06-23 NOTE — PROGRESS NOTES
Patient: Ty Lino                  : 1945   MRN: [id number]   Date of Visit: 25     Physician: Mark Barton MD.     Reason for Visit: Status post revision R TKA with mesh reconstruction on chronic supression for MRSA infection    Subjective History of Present Illness:     Ty Lino is here for regularly scheduled follow-up s/p right knee revision and mesh reconstruction     He recently sustained a comminuted periprosthetic tibia fracture which was treated by my partner with plating. He is using a bone stimulator. He is only having mild pain.    Physical Examination??: ?   General: Patient is alert and oriented x 3 and appears comfortable.   R knee: incision CDI    Able to perofrm SLR ROM 0-90    Radiographs: Radiographs: Standing AP/lateral/[place] RIGHT Knee: Imaging was reviewed with the patient. There is a well positioned knee arthroplasty. There is a periprosthetic tibia fracture distal to stem with delayed healing looseninig.    Assessment and Plan?: The patient is progressing well approximately 24 mo s/p revision R TKA with a mesh EM reconstruction on chronic supressive antibiotics for MRSA infection    He continues on supression. He will f/u with my partner regarding his tibia fracture    Spoke with the patient regarding the use of over-the-counter medications both oral and topical medications for pain control.      ______________________      Mark Barton MD

## 2025-08-20 ENCOUNTER — OFFICE VISIT (OUTPATIENT)
Dept: ORTHOPEDIC SURGERY | Age: 80
End: 2025-08-20

## 2025-08-20 VITALS — HEIGHT: 65 IN | WEIGHT: 264 LBS | BODY MASS INDEX: 43.99 KG/M2

## 2025-08-20 DIAGNOSIS — S82.251A CLOSED DISPLACED COMMINUTED FRACTURE OF SHAFT OF RIGHT TIBIA, INITIAL ENCOUNTER: Primary | ICD-10-CM

## 2025-08-22 RX ORDER — DOXYCYCLINE HYCLATE 100 MG
100 TABLET ORAL 2 TIMES DAILY
Qty: 180 TABLET | Refills: 3 | Status: SHIPPED | OUTPATIENT
Start: 2025-08-22 | End: 2026-02-18

## (undated) DEVICE — STERILE POLYISOPRENE POWDER-FREE SURGICAL GLOVES WITH EMOLLIENT COATING: Brand: PROTEXIS

## (undated) DEVICE — DECANTER FLD 9IN ST BG FOR ASEP TRNSF OF FLD

## (undated) DEVICE — SUTURE ETHLN SZ 3-0 L30IN NONABSORBABLE BLK L36MM FSLX 3/8 1673BH

## (undated) DEVICE — TOTAL TRAY, DB, 100% SILI FOLEY, 16FR 10: Brand: MEDLINE

## (undated) DEVICE — HANDPIECE SET WITH HIGH FLOW TIP AND SUCTION TUBE: Brand: INTERPULSE

## (undated) DEVICE — TOTAL BASIC PK

## (undated) DEVICE — MARKER,SKIN,WI/RULER AND LABELS: Brand: MEDLINE

## (undated) DEVICE — SUTURE PDS II SZ 1 L96IN ABSRB VLT TP-1 L65MM 1/2 CIR Z880G

## (undated) DEVICE — APPLICATOR MEDICATED 26 CC SOLUTION HI LT ORNG CHLORAPREP

## (undated) DEVICE — DRESSING CNTCT 4X12IN IS THERABOND 3D

## (undated) DEVICE — SOLUTION IV 1000ML 0.9% SOD CHL

## (undated) DEVICE — 2108 SERIES SAGITTAL BLADE FAN, OFFSET  (29.0 X 0.89 X 73.0MM)

## (undated) DEVICE — TRAP FLUID

## (undated) DEVICE — STERILE TOTAL KNEE DRAPE PACK: Brand: CARDINAL HEALTH

## (undated) DEVICE — ADHESIVE SKIN CLOSURE WND 8.661X1.5 IN 22 CM LIQUIBAND SECUR

## (undated) DEVICE — SUTURE ETHLN SZ 3-0 L18IN NONABSORBABLE BLK L24MM PS-1 3/8 1663G

## (undated) DEVICE — BANDAGE COMPR W6INXL10YD ST M E WHITE/BEIGE

## (undated) DEVICE — GUIDEWIRE ORTH L150MM DIA0.053IN W/ TRCR TIP FOR ANK FRAC

## (undated) DEVICE — BOWL MED L 32OZ PLAS W/ MOLD GRAD EZ OPN PEEL PCH

## (undated) DEVICE — SUTURE VCRL + SZ 1 L36IN ABSRB UD L36MM CT-1 1/2 CIR VCP947H

## (undated) DEVICE — HOOD, PEEL-AWAY: Brand: FLYTE

## (undated) DEVICE — CONTAINER,SPECIMEN,OR STERILE,4OZ: Brand: MEDLINE

## (undated) DEVICE — TUBING, SUCTION, 1/4" X 10', STRAIGHT: Brand: MEDLINE

## (undated) DEVICE — 450 ML BOTTLE OF 0.05% CHLORHEXIDINE GLUCONATE IN 99.95% STERILE WATER FOR IRRIGATION, USP AND APPLICATOR.: Brand: IRRISEPT ANTIMICROBIAL WOUND LAVAGE

## (undated) DEVICE — COVER LT HNDL BLU PLAS

## (undated) DEVICE — SPLINT ORTH CLOSED PAT UNIV 20 IN BLK PROCARE QUICK-FIT

## (undated) DEVICE — SHEET,DRAPE,53X77,STERILE: Brand: MEDLINE

## (undated) DEVICE — SUTURE MCRYL + SZ 3-0 L27IN ABSRB UD PS1 L24MM 3/8 CIR REV MCP936H

## (undated) DEVICE — GLOVE ORTHO 7 1/2   MSG9475

## (undated) DEVICE — STRYKER PERFORMANCE SERIES SAGITTAL BLADE: Brand: STRYKER PERFORMANCE SERIES

## (undated) DEVICE — HOOD: Brand: FLYTE

## (undated) DEVICE — SUTURE STRATAFIX SZ 1 L14IN ABSRB VLT L36MM MO-4 TAPERPOINT SXPD2B400

## (undated) DEVICE — WET SKIN PREP TRAY: Brand: MEDLINE INDUSTRIES, INC.

## (undated) DEVICE — SYRINGE MED 30ML STD CLR PLAS LUERLOCK TIP N CTRL DISP

## (undated) DEVICE — GAUZE,SPONGE,4"X4",8PLY,STRL,LF,10/TRAY: Brand: MEDLINE

## (undated) DEVICE — PADDING UNDERCAST W4INXL4YD 100% COT CRIMPED FINISH WBRL II

## (undated) DEVICE — DRAPE,U/ SHT,SPLIT,PLAS,STERIL: Brand: MEDLINE

## (undated) DEVICE — SUTURE PDS + SZ 0 L36IN ABSRB VLT CT 1 L36MM 1 2 CIR PDP346H

## (undated) DEVICE — 2108 SERIES SAGITTAL BLADE, NO OFFSET  (12.4 X 1.19 X 82.1MM)

## (undated) DEVICE — PADDING CAST W6INXL4YD ST COT RAYON MICROPLEATED HIGHLY

## (undated) DEVICE — KIT EVAC 400CC DIA1/4IN H PAT 12.5IN 3 SPR RND SHP PVC DRN

## (undated) DEVICE — SOLUTION INJ LR VISIV 1000ML BG

## (undated) DEVICE — DRAPE,TOWEL,LARGE,INVISISHIELD: Brand: MEDLINE

## (undated) DEVICE — SCREW BNE L30MM DIA3.5MM CORT ANK S STL NONLOCKING LO PROF
Type: IMPLANTABLE DEVICE | Site: TIBIA | Status: NON-FUNCTIONAL
Removed: 2025-01-06

## (undated) DEVICE — COVER,MAYO STAND,XL,STERILE: Brand: MEDLINE

## (undated) DEVICE — BANDAGE,ELASTIC,ESMARK,STERILE,6"X9',LF: Brand: MEDLINE

## (undated) DEVICE — PAD,ABDOMINAL,5"X9",ST,LF,25/BX: Brand: MEDLINE INDUSTRIES, INC.

## (undated) DEVICE — SUTURE VICRYL + SZ 0 L18IN ABSRB UD L36MM CT-1 1/2 CIR VCP840D

## (undated) DEVICE — FAIRFIELD KNEE LF

## (undated) DEVICE — DRESSING,GAUZE,XEROFORM,CURAD,5"X9",ST: Brand: CURAD

## (undated) DEVICE — SYSTEM SKIN CLSR 22CM DERMBND PRINEO

## (undated) DEVICE — YANKAUER,OPEN TIP,W/O VENT,STERILE: Brand: MEDLINE INDUSTRIES, INC.

## (undated) DEVICE — 3.2MM X 18.3MM METAL CUTTING HELIOCOIDAL RASP

## (undated) DEVICE — SUTURE VCRL + SZ 2-0 L36IN ABSRB UD L36MM CT-1 1/2 CIR VCP945H

## (undated) DEVICE — YANKAUER,BULB TIP,W/O VENT,RIGID,STERILE: Brand: MEDLINE

## (undated) DEVICE — SUTURE VICRYL + SZ 3-0 L18IN ABSRB UD SH 1/2 CIR TAPERCUT NDL VCP864D

## (undated) DEVICE — SPONGE LAP W18XL18IN WHT COT 4 PLY FLD STRUNG RADPQ DISP ST 2 PER PACK

## (undated) DEVICE — SOLUTION IRRIG 3000ML 0.9% SOD CHL USP UROMATIC PLAS CONT

## (undated) DEVICE — DRAPE C ARM W/ POLY STRP W42XL72IN FOR MOB XR

## (undated) DEVICE — RECIPROCATING BLADE, DOUBLE SIDED, OFFSET  (70.0 X 0.64 X 12.6MM)

## (undated) DEVICE — SUTURE VCRL + 1 L27IN ABSRB UD CT-1 L36MM 1/2 CIR TAPR PNT VCP261H

## (undated) DEVICE — FAN SPRAY TIP

## (undated) DEVICE — STAPLER SKIN H3.9MM WIRE DIA0.58MM CRWN 6.9MM 35 STPL ROT

## (undated) DEVICE — SUTURE VICRYL + SZ 3-0 L18IN CT 1 CR ABSRB VCP838D

## (undated) DEVICE — Device: Brand: STABLECUT®

## (undated) DEVICE — STERILE VELCLOSE ELASTIC BANDAGE, 6IN: Brand: VELCLOSE

## (undated) DEVICE — HYPODERMIC SAFETY NEEDLE: Brand: MAGELLAN

## (undated) DEVICE — SUTURE VICRYL SZ 2-0 L18IN ABSRB UD CT-1 L36MM 1/2 CIR J839D

## (undated) DEVICE — Device: Brand: POWER-FLO®

## (undated) DEVICE — SPONGE,LAP,18"X18",DLX,XR,ST,5/PK,40/PK: Brand: MEDLINE

## (undated) DEVICE — COUNTER NDL 40 COUNT HLD 70 NUM FOAM BLK SGL MAG W BLDE REMV

## (undated) DEVICE — SCREWDRIVER SURG OD2MM HEX FEM CANN KNEE SET S STL NXGN

## (undated) DEVICE — BIT DRL DIA3.5MM TRIM-IT

## (undated) DEVICE — PRECISION THIN (9.0 X 0.38 X 31.0MM)

## (undated) DEVICE — BIT DRL DIA4.3MM TIB CALIB FOR NAT NAIL 18 SYS

## (undated) DEVICE — SUTURE VCRL SZ 0 L36IN ABSRB UD CT-1 L36MM 1/2 CIR TAPR PNT VCP946H

## (undated) DEVICE — KIT EVAC 400CC DIA3/16IN H PAT 12.5IN 3 SPR RND SHP PVC DRN

## (undated) DEVICE — SYRINGE, LUER LOCK, 30ML: Brand: MEDLINE

## (undated) DEVICE — BIT DRL DIA2.5MM LNG GRAD FOR ANK FRAC MGMT SYS

## (undated) DEVICE — HEADLESS TROCHAR PIN 75MM: Brand: ZUK

## (undated) DEVICE — ZIMMER® STERILE DISPOSABLE TOURNIQUET CUFF WITH PLC, DUAL PORT, SINGLE BLADDER, 30 IN. (76 CM)

## (undated) DEVICE — TOWEL,STOP FLAG GOLD N-W: Brand: MEDLINE

## (undated) DEVICE — GLOVE ORANGE PI 7 1/2   MSG9075

## (undated) DEVICE — BIT DRL DIA2.5MM FOR ANK FRAC MGMT SYS

## (undated) DEVICE — SUTURE ETHBND EXCEL SZ 5 L30IN NONABSORBABLE GRN L48MM V-40 MB46G

## (undated) DEVICE — COVER,TABLE,HEAVY DUTY,77"X90",STRL: Brand: MEDLINE

## (undated) DEVICE — SUTURE MONOCRYL + SZ 4-0 L27IN ABSRB UD L19MM PS-2 3/8 CIR MCP426H

## (undated) DEVICE — STAPLER SKIN H3.9MM WIRE DIA0.58MM CRWN 6.9MM 35 STPL FIX

## (undated) DEVICE — LOWER EXTREMITY: Brand: MEDLINE INDUSTRIES, INC.

## (undated) DEVICE — SUTURE VICRYL + SZ 2-0 L18IN ABSRB VLT L26MM SH 1/2 CIR VCP775D

## (undated) DEVICE — Device

## (undated) DEVICE — PAD ABSRB W8XL10IN ABD HYDROPHOBIC NONWOVEN THCK LAYR CELOS

## (undated) DEVICE — STRIP,CLOSURE,WOUND,MEDI-STRIP,1/4X3: Brand: MEDLINE

## (undated) DEVICE — SUTURE PDS + SZ 2 0 L27IN ABSRB VLT L36MM CT 1 1 2 CIR PDP339H

## (undated) DEVICE — GUIDE WIRE 3X1000MM ST

## (undated) DEVICE — SCREW BNE HEX HD 3.5 MM
Type: IMPLANTABLE DEVICE | Site: KNEE | Status: NON-FUNCTIONAL
Removed: 2023-07-12

## (undated) DEVICE — SUTURE PDS II SZ 2-0 L18IN ABSRB VLT SH L26MM 1/2 CIR TAPR Z775D

## (undated) DEVICE — GRAFT KIT PTTY 10 CC CONVENIENCE
Type: IMPLANTABLE DEVICE | Site: KNEE | Status: NON-FUNCTIONAL
Removed: 2023-07-12

## (undated) DEVICE — BLADE,CARBON-STEEL,10,STRL,DISPOSABLE,TB: Brand: MEDLINE